# Patient Record
Sex: FEMALE | Race: WHITE | Employment: OTHER
[De-identification: names, ages, dates, MRNs, and addresses within clinical notes are randomized per-mention and may not be internally consistent; named-entity substitution may affect disease eponyms.]

---

## 2017-01-03 ENCOUNTER — OFFICE VISIT (OUTPATIENT)
Dept: ORTHOPEDIC SURGERY | Facility: CLINIC | Age: 62
End: 2017-01-03

## 2017-01-03 DIAGNOSIS — M51.37 DEGENERATION OF LUMBAR OR LUMBOSACRAL INTERVERTEBRAL DISC: Primary | ICD-10-CM

## 2017-01-03 DIAGNOSIS — M96.1 POSTLAMINECTOMY SYNDROME, LUMBAR REGION: ICD-10-CM

## 2017-01-03 PROCEDURE — 99024 POSTOP FOLLOW-UP VISIT: CPT | Performed by: ORTHOPAEDIC SURGERY

## 2017-01-10 LAB — HBA1C MFR BLD: 10 %

## 2017-01-12 ENCOUNTER — OFFICE VISIT (OUTPATIENT)
Dept: ORTHOPEDIC SURGERY | Facility: CLINIC | Age: 62
End: 2017-01-12

## 2017-01-12 DIAGNOSIS — M19.90 OSTEOARTHRITIS, UNSPECIFIED OSTEOARTHRITIS TYPE, UNSPECIFIED SITE: ICD-10-CM

## 2017-01-12 DIAGNOSIS — M96.1 POSTLAMINECTOMY SYNDROME, LUMBAR REGION: ICD-10-CM

## 2017-01-12 DIAGNOSIS — M51.37 DEGENERATION OF LUMBAR OR LUMBOSACRAL INTERVERTEBRAL DISC: Primary | ICD-10-CM

## 2017-01-12 PROCEDURE — 99024 POSTOP FOLLOW-UP VISIT: CPT | Performed by: ORTHOPAEDIC SURGERY

## 2017-01-12 RX ORDER — SULFAMETHOXAZOLE AND TRIMETHOPRIM 800; 160 MG/1; MG/1
1 TABLET ORAL 2 TIMES DAILY
Qty: 30 TABLET | Refills: 0 | Status: SHIPPED | OUTPATIENT
Start: 2017-01-12 | End: 2017-01-27

## 2017-03-20 ENCOUNTER — HOSPITAL ENCOUNTER (OUTPATIENT)
Age: 62
Discharge: HOME | End: 2017-03-20
Payer: COMMERCIAL

## 2017-03-20 LAB
ESTIMATED AVERAGE GLUCOSE: 186 MG/DL
HBA1C MFR BLD: 8.1 % (ref 4–6)

## 2017-03-20 PROCEDURE — 83036 HEMOGLOBIN GLYCOSYLATED A1C: CPT

## 2017-03-20 PROCEDURE — 36415 COLL VENOUS BLD VENIPUNCTURE: CPT

## 2017-04-27 ENCOUNTER — OFFICE VISIT (OUTPATIENT)
Dept: UROLOGY | Age: 62
End: 2017-04-27
Payer: COMMERCIAL

## 2017-04-27 VITALS
HEIGHT: 62 IN | BODY MASS INDEX: 33.49 KG/M2 | DIASTOLIC BLOOD PRESSURE: 73 MMHG | HEART RATE: 58 BPM | SYSTOLIC BLOOD PRESSURE: 132 MMHG | WEIGHT: 182 LBS

## 2017-04-27 DIAGNOSIS — R32 INCONTINENCE IN FEMALE: Primary | ICD-10-CM

## 2017-04-27 DIAGNOSIS — R35.1 NOCTURIA: ICD-10-CM

## 2017-04-27 DIAGNOSIS — N39.41 URGENCY INCONTINENCE: ICD-10-CM

## 2017-04-27 DIAGNOSIS — R35.0 FREQUENCY OF MICTURITION: ICD-10-CM

## 2017-04-27 PROCEDURE — 99204 OFFICE O/P NEW MOD 45 MIN: CPT | Performed by: UROLOGY

## 2017-04-27 RX ORDER — HYDROCODONE BITARTRATE AND ACETAMINOPHEN 5; 325 MG/1; MG/1
1 TABLET ORAL EVERY 6 HOURS PRN
COMMUNITY
Start: 2017-04-06 | End: 2020-06-16 | Stop reason: SDUPTHER

## 2017-04-27 RX ORDER — TOLTERODINE 4 MG/1
1 CAPSULE, EXTENDED RELEASE ORAL DAILY
Refills: 0 | COMMUNITY
Start: 2017-03-21 | End: 2017-06-01 | Stop reason: ALTCHOICE

## 2017-04-27 ASSESSMENT — ENCOUNTER SYMPTOMS
COLOR CHANGE: 0
BACK PAIN: 0
SHORTNESS OF BREATH: 0
WHEEZING: 0
EYE PAIN: 0
NAUSEA: 0
VOMITING: 0
ABDOMINAL PAIN: 0
COUGH: 0
EYE REDNESS: 0

## 2017-06-01 ENCOUNTER — OFFICE VISIT (OUTPATIENT)
Dept: UROLOGY | Age: 62
End: 2017-06-01
Payer: COMMERCIAL

## 2017-06-01 VITALS
WEIGHT: 180 LBS | BODY MASS INDEX: 33.13 KG/M2 | DIASTOLIC BLOOD PRESSURE: 64 MMHG | HEIGHT: 62 IN | TEMPERATURE: 98.3 F | HEART RATE: 64 BPM | SYSTOLIC BLOOD PRESSURE: 118 MMHG

## 2017-06-01 DIAGNOSIS — R35.0 FREQUENCY OF MICTURITION: ICD-10-CM

## 2017-06-01 DIAGNOSIS — N39.41 URGENCY INCONTINENCE: ICD-10-CM

## 2017-06-01 DIAGNOSIS — R35.1 NOCTURIA: ICD-10-CM

## 2017-06-01 DIAGNOSIS — R32 INCONTINENCE IN FEMALE: Primary | ICD-10-CM

## 2017-06-01 PROCEDURE — 99213 OFFICE O/P EST LOW 20 MIN: CPT | Performed by: NURSE PRACTITIONER

## 2017-06-01 ASSESSMENT — ENCOUNTER SYMPTOMS
EYE REDNESS: 0
COLOR CHANGE: 0
WHEEZING: 0
COUGH: 0
SHORTNESS OF BREATH: 0
EYE PAIN: 0
ABDOMINAL PAIN: 0
BACK PAIN: 1
VOMITING: 0
NAUSEA: 0

## 2017-07-08 RX ORDER — LOSARTAN POTASSIUM AND HYDROCHLOROTHIAZIDE 12.5; 5 MG/1; MG/1
1 TABLET ORAL DAILY
Qty: 90 TABLET | Refills: 0 | Status: SHIPPED | OUTPATIENT
Start: 2017-07-08 | End: 2017-11-17 | Stop reason: SDUPTHER

## 2017-07-12 ENCOUNTER — TELEPHONE (OUTPATIENT)
Dept: UROLOGY | Age: 62
End: 2017-07-12

## 2017-07-20 VITALS — BODY MASS INDEX: 33.49 KG/M2 | WEIGHT: 182 LBS | HEIGHT: 62 IN

## 2017-07-20 DIAGNOSIS — E08.21 DIABETES MELLITUS DUE TO UNDERLYING CONDITION WITH DIABETIC NEPHROPATHY, WITH LONG-TERM CURRENT USE OF INSULIN (HCC): ICD-10-CM

## 2017-07-20 DIAGNOSIS — Z79.4 DIABETES MELLITUS DUE TO UNDERLYING CONDITION WITH DIABETIC NEPHROPATHY, WITH LONG-TERM CURRENT USE OF INSULIN (HCC): ICD-10-CM

## 2017-07-20 DIAGNOSIS — E78.00 PURE HYPERCHOLESTEROLEMIA: ICD-10-CM

## 2017-07-20 DIAGNOSIS — Z96.41 INSULIN PUMP IN PLACE: ICD-10-CM

## 2017-07-20 RX ORDER — GABAPENTIN 800 MG/1
TABLET ORAL
Refills: 0 | COMMUNITY
Start: 2017-07-07 | End: 2017-07-21 | Stop reason: ALTCHOICE

## 2017-07-20 RX ORDER — DIAZEPAM 10 MG/1
10 TABLET ORAL EVERY 6 HOURS PRN
COMMUNITY
End: 2017-07-21 | Stop reason: ALTCHOICE

## 2017-07-20 RX ORDER — TOLTERODINE 4 MG/1
4 CAPSULE, EXTENDED RELEASE ORAL DAILY
COMMUNITY
End: 2017-07-21 | Stop reason: ALTCHOICE

## 2017-07-21 ENCOUNTER — OFFICE VISIT (OUTPATIENT)
Dept: ENDOCRINOLOGY | Age: 62
End: 2017-07-21
Payer: COMMERCIAL

## 2017-07-21 VITALS
DIASTOLIC BLOOD PRESSURE: 71 MMHG | HEART RATE: 73 BPM | SYSTOLIC BLOOD PRESSURE: 119 MMHG | HEIGHT: 62 IN | WEIGHT: 186 LBS | BODY MASS INDEX: 34.23 KG/M2

## 2017-07-21 DIAGNOSIS — I10 ESSENTIAL HYPERTENSION: ICD-10-CM

## 2017-07-21 DIAGNOSIS — E10.65 TYPE 1 DIABETES MELLITUS WITH HYPERGLYCEMIA (HCC): Primary | ICD-10-CM

## 2017-07-21 DIAGNOSIS — Z46.81 INSULIN PUMP TITRATION: ICD-10-CM

## 2017-07-21 DIAGNOSIS — Z96.41 INSULIN PUMP IN PLACE: ICD-10-CM

## 2017-07-21 DIAGNOSIS — E78.00 HYPERCHOLESTEROLEMIA: ICD-10-CM

## 2017-07-21 DIAGNOSIS — E66.9 NON MORBID OBESITY, UNSPECIFIED OBESITY TYPE: ICD-10-CM

## 2017-07-21 DIAGNOSIS — E89.0 POSTOPERATIVE HYPOTHYROIDISM: ICD-10-CM

## 2017-07-21 PROCEDURE — 99214 OFFICE O/P EST MOD 30 MIN: CPT | Performed by: INTERNAL MEDICINE

## 2017-07-21 RX ORDER — LEVOTHYROXINE SODIUM 88 UG/1
88 TABLET ORAL DAILY
Qty: 30 TABLET | Refills: 7 | Status: SHIPPED | OUTPATIENT
Start: 2017-07-21 | End: 2018-08-05 | Stop reason: SDUPTHER

## 2017-09-21 ENCOUNTER — OFFICE VISIT (OUTPATIENT)
Dept: ENDOCRINOLOGY | Age: 62
End: 2017-09-21
Payer: COMMERCIAL

## 2017-09-21 VITALS
WEIGHT: 180 LBS | HEIGHT: 62 IN | BODY MASS INDEX: 33.13 KG/M2 | HEART RATE: 80 BPM | SYSTOLIC BLOOD PRESSURE: 110 MMHG | DIASTOLIC BLOOD PRESSURE: 60 MMHG

## 2017-09-21 DIAGNOSIS — E10.65 TYPE 1 DIABETES MELLITUS WITH HYPERGLYCEMIA (HCC): Primary | ICD-10-CM

## 2017-09-21 DIAGNOSIS — Z46.81 INSULIN PUMP TITRATION: ICD-10-CM

## 2017-09-21 DIAGNOSIS — K59.1 FUNCTIONAL DIARRHEA: ICD-10-CM

## 2017-09-21 DIAGNOSIS — Z96.41 INSULIN PUMP IN PLACE: ICD-10-CM

## 2017-09-21 DIAGNOSIS — R10.30 LOWER ABDOMINAL PAIN: ICD-10-CM

## 2017-09-21 DIAGNOSIS — E89.0 POSTOPERATIVE HYPOTHYROIDISM: ICD-10-CM

## 2017-09-21 DIAGNOSIS — Z23 FLU VACCINE NEED: ICD-10-CM

## 2017-09-21 DIAGNOSIS — I10 ESSENTIAL HYPERTENSION: ICD-10-CM

## 2017-09-21 PROCEDURE — 99214 OFFICE O/P EST MOD 30 MIN: CPT | Performed by: INTERNAL MEDICINE

## 2017-09-21 PROCEDURE — G0008 ADMIN INFLUENZA VIRUS VAC: HCPCS | Performed by: INTERNAL MEDICINE

## 2017-09-21 PROCEDURE — 90688 IIV4 VACCINE SPLT 0.5 ML IM: CPT | Performed by: INTERNAL MEDICINE

## 2017-11-30 ENCOUNTER — TELEPHONE (OUTPATIENT)
Dept: UROLOGY | Age: 62
End: 2017-11-30

## 2017-11-30 ENCOUNTER — OFFICE VISIT (OUTPATIENT)
Dept: UROLOGY | Age: 62
End: 2017-11-30
Payer: COMMERCIAL

## 2017-11-30 VITALS
HEART RATE: 72 BPM | WEIGHT: 182 LBS | SYSTOLIC BLOOD PRESSURE: 93 MMHG | HEIGHT: 62 IN | BODY MASS INDEX: 33.49 KG/M2 | TEMPERATURE: 98.3 F | DIASTOLIC BLOOD PRESSURE: 60 MMHG

## 2017-11-30 DIAGNOSIS — R35.0 FREQUENCY OF MICTURITION: ICD-10-CM

## 2017-11-30 DIAGNOSIS — R35.1 NOCTURIA: ICD-10-CM

## 2017-11-30 DIAGNOSIS — R33.9 INCOMPLETE EMPTYING OF BLADDER: Primary | ICD-10-CM

## 2017-11-30 DIAGNOSIS — N39.41 URGENCY INCONTINENCE: ICD-10-CM

## 2017-11-30 DIAGNOSIS — R32 INCONTINENCE IN FEMALE: ICD-10-CM

## 2017-11-30 PROCEDURE — 51798 US URINE CAPACITY MEASURE: CPT | Performed by: NURSE PRACTITIONER

## 2017-11-30 PROCEDURE — 99214 OFFICE O/P EST MOD 30 MIN: CPT | Performed by: NURSE PRACTITIONER

## 2017-11-30 ASSESSMENT — ENCOUNTER SYMPTOMS
VOMITING: 0
EYE REDNESS: 0
COLOR CHANGE: 0
BACK PAIN: 1
SHORTNESS OF BREATH: 0
ABDOMINAL PAIN: 0
NAUSEA: 0
EYE PAIN: 0
COUGH: 0
WHEEZING: 0

## 2017-11-30 NOTE — LETTER
[facility]  [facility]  [address] [address]  Dept: [phone]  Dept Fax: [phone]        11/30/17    Patient: Shira Brooks  YOB: 1955    Dear Sebastian Dinero MD,    I had the pleasure of seeing one of your patients, Kristina Aguayo today in the office today. Below are the relevant portions of my assessment and plan of care. IMPRESSION:     1. Incomplete emptying of bladder    2. Incontinence in female    3. Urgency incontinence    4. Frequency of micturition    5. Nocturia        PLAN:   Continues kegels  Daily- squeeze and hold 3 seconds relax 3 seconds, repeat 12x/ 4-5x per day    Quick flick 97F 4-5 x per day    Continue Myrbetriq    kegel with lifting, bending    PFT after the first of the uyr. No Follow-up on file. Prescriptions Ordered:  Orders Placed This Encounter   Medications    Mirabegron ER (MYRBETRIQ) 50 MG TB24     Sig: Take 50 mg by mouth daily     Dispense:  30 tablet     Refill:  11      Orders Placed:  Orders Placed This Encounter   Procedures    GA MEASUREMENT,POST-VOID RESIDUAL VOLUME BY US,NON-IMAGING         Thank you for allowing me to participate in the care of this patient. I will keep you updated on this patient's follow up and I look forward to serving you and your patients again in the future.     Florencio Frankel, CNP

## 2017-12-01 RX ORDER — METOCLOPRAMIDE 5 MG/1
5 TABLET ORAL 2 TIMES DAILY
Qty: 60 TABLET | Refills: 3 | Status: SHIPPED | OUTPATIENT
Start: 2017-12-01 | End: 2018-11-10 | Stop reason: SDUPTHER

## 2017-12-01 RX ORDER — SERTRALINE HYDROCHLORIDE 100 MG/1
100 TABLET, FILM COATED ORAL DAILY
Qty: 30 TABLET | Refills: 3 | Status: SHIPPED | OUTPATIENT
Start: 2017-12-01 | End: 2018-05-03 | Stop reason: SDUPTHER

## 2017-12-06 ENCOUNTER — OFFICE VISIT (OUTPATIENT)
Dept: ENDOCRINOLOGY | Age: 62
End: 2017-12-06
Payer: COMMERCIAL

## 2017-12-06 VITALS
WEIGHT: 183.8 LBS | SYSTOLIC BLOOD PRESSURE: 120 MMHG | TEMPERATURE: 97.8 F | HEART RATE: 83 BPM | DIASTOLIC BLOOD PRESSURE: 60 MMHG | BODY MASS INDEX: 33.82 KG/M2 | HEIGHT: 62 IN

## 2017-12-06 DIAGNOSIS — E11.43 DIABETIC GASTROPARESIS (HCC): ICD-10-CM

## 2017-12-06 DIAGNOSIS — K31.84 DIABETIC GASTROPARESIS (HCC): ICD-10-CM

## 2017-12-06 DIAGNOSIS — E89.0 POSTOPERATIVE HYPOTHYROIDISM: ICD-10-CM

## 2017-12-06 DIAGNOSIS — Z96.41 INSULIN PUMP IN PLACE: ICD-10-CM

## 2017-12-06 DIAGNOSIS — I10 ESSENTIAL HYPERTENSION: ICD-10-CM

## 2017-12-06 DIAGNOSIS — E10.65 TYPE 1 DIABETES MELLITUS WITH HYPERGLYCEMIA (HCC): Primary | ICD-10-CM

## 2017-12-06 DIAGNOSIS — E10.65 TYPE 1 DIABETES MELLITUS WITH HYPERGLYCEMIA (HCC): ICD-10-CM

## 2017-12-06 DIAGNOSIS — K21.9 GASTROESOPHAGEAL REFLUX DISEASE WITHOUT ESOPHAGITIS: ICD-10-CM

## 2017-12-06 LAB
ALBUMIN SERPL-MCNC: NORMAL G/DL
ALP BLD-CCNC: NORMAL U/L
ALT SERPL-CCNC: NORMAL U/L
ANION GAP SERPL CALCULATED.3IONS-SCNC: NORMAL MMOL/L
AST SERPL-CCNC: NORMAL U/L
AVERAGE GLUCOSE: 206
BASOPHILS ABSOLUTE: NORMAL /ΜL
BASOPHILS RELATIVE PERCENT: NORMAL %
BILIRUB SERPL-MCNC: NORMAL MG/DL (ref 0.1–1.4)
BILIRUBIN, URINE: NORMAL
BLOOD, URINE: NORMAL
BUN BLDV-MCNC: NORMAL MG/DL
CALCIUM SERPL-MCNC: NORMAL MG/DL
CHLORIDE BLD-SCNC: NORMAL MMOL/L
CHOLESTEROL, TOTAL: 150 MG/DL
CHOLESTEROL/HDL RATIO: 3.1
CLARITY: NORMAL
CO2: NORMAL MMOL/L
COLOR: NORMAL
CREAT SERPL-MCNC: NORMAL MG/DL
CREATININE URINE: NORMAL MG/DL
EOSINOPHILS ABSOLUTE: NORMAL /ΜL
EOSINOPHILS RELATIVE PERCENT: NORMAL %
GFR CALCULATED: NORMAL
GLUCOSE BLD-MCNC: NORMAL MG/DL
GLUCOSE URINE: NORMAL
HBA1C MFR BLD: 8.8 %
HCT VFR BLD CALC: NORMAL % (ref 36–46)
HDLC SERPL-MCNC: 48 MG/DL (ref 35–70)
HEMOGLOBIN: NORMAL G/DL (ref 12–16)
KETONES, URINE: NORMAL
LDL CHOLESTEROL CALCULATED: 89 MG/DL (ref 0–160)
LEUKOCYTE ESTERASE, URINE: NORMAL
LYMPHOCYTES ABSOLUTE: NORMAL /ΜL
LYMPHOCYTES RELATIVE PERCENT: NORMAL %
MCH RBC QN AUTO: NORMAL PG
MCHC RBC AUTO-ENTMCNC: NORMAL G/DL
MCV RBC AUTO: NORMAL FL
MICROALBUMIN/CREAT 24H UR: NORMAL MG/G{CREAT}
MONOCYTES ABSOLUTE: NORMAL /ΜL
MONOCYTES RELATIVE PERCENT: NORMAL %
NEUTROPHILS ABSOLUTE: NORMAL /ΜL
NEUTROPHILS RELATIVE PERCENT: NORMAL %
NITRITE, URINE: NORMAL
PDW BLD-RTO: NORMAL %
PH UA: NORMAL (ref 4.5–8)
PLATELET # BLD: NORMAL K/ΜL
PMV BLD AUTO: NORMAL FL
POTASSIUM SERPL-SCNC: NORMAL MMOL/L
PROTEIN UA: NORMAL
RBC # BLD: NORMAL 10^6/ΜL
SODIUM BLD-SCNC: NORMAL MMOL/L
SPECIFIC GRAVITY, URINE: NORMAL
TOTAL PROTEIN: NORMAL
TRIGL SERPL-MCNC: 66 MG/DL
TSH SERPL DL<=0.05 MIU/L-ACNC: NORMAL UIU/ML
UROBILINOGEN, URINE: NORMAL
VLDLC SERPL CALC-MCNC: 13 MG/DL
WBC # BLD: NORMAL 10^3/ML

## 2017-12-06 PROCEDURE — 99214 OFFICE O/P EST MOD 30 MIN: CPT | Performed by: INTERNAL MEDICINE

## 2017-12-06 RX ORDER — SUCRALFATE 1 G/1
1 TABLET ORAL 4 TIMES DAILY
Qty: 120 TABLET | Refills: 2 | Status: SHIPPED | OUTPATIENT
Start: 2017-12-06

## 2017-12-06 NOTE — PROGRESS NOTES
Chronic Disease Visit Information    BP Readings from Last 3 Encounters:   11/30/17 93/60   09/21/17 110/60   07/21/17 119/71          Hemoglobin A1C (%)   Date Value   03/20/2017 8.1 (H)   01/10/2017 10.0   04/13/2016 7.9 (H)     Microalb/Crt. Ratio (mcg/mg creat)   Date Value   09/13/2016 7     LDL Cholesterol (mg/dL)   Date Value   09/13/2016 72     HDL (mg/dL)   Date Value   09/13/2016 60     BUN (mg/dL)   Date Value   11/14/2016 17     CREATININE (mg/dL)   Date Value   11/14/2016 0.63     Glucose (mg/dL)   Date Value   11/14/2016 171 (H)   10/13/2011 310 (H)            Have you changed or started any medications since your last visit including any over-the-counter medicines, vitamins, or herbal medicines? no   Are you having any side effects from any of your medications? -  no  Have you stopped taking any of your medications? Is so, why? -  no    Have you seen any other physician or provider since your last visit? Yes - Records Requested  Have you had any other diagnostic tests since your last visit? No  Have you been seen in the emergency room and/or had an admission to a hospital since we last saw you? No  Have you had your annual diabetic retinal (eye) exam? Yes - Records Requested  Have you had your routine dental cleaning in the past 6 months? no    Have you activated your [employer] account? If not, what are your barriers?  No: pending     Patient Care Team:  Lianet Briggs MD as PCP - General (Family Medicine)  Jean Claude Rodriguez MD as Orthopedic Surgeon (Orthopedic Surgery)  Monica Hill MD as Consulting Physician (Gastroenterology)  Zully Stafford MD as Consulting Physician (Internal Medicine)  Stefanie Man MD as Consulting Physician (Cardiology)  Jose Hernandez MD as Surgeon (Ophthalmology)         Medical History Review  Past Medical, Family, and Social History reviewed and does contribute to the patient presenting condition    Health Maintenance   Topic Date Due    Hepatitis C screen  1955    HIV screen  09/27/1970    DTaP/Tdap/Td vaccine (1 - Tdap) 09/27/1974    Cervical cancer screen  09/27/1976    Breast cancer screen  09/27/2005    Zostavax vaccine  09/27/2015    Diabetic microalbuminuria test  09/13/2017    Lipid screen  09/13/2017    Diabetic foot exam  01/10/2018    Diabetic hemoglobin A1C test  03/20/2018    Diabetic retinal exam  03/27/2018    Colon cancer screen colonoscopy  09/14/2026    Flu vaccine  Completed    Pneumococcal med risk  Completed

## 2017-12-19 ENCOUNTER — TELEPHONE (OUTPATIENT)
Dept: INTERNAL MEDICINE CLINIC | Age: 62
End: 2017-12-19

## 2018-01-18 ENCOUNTER — TELEPHONE (OUTPATIENT)
Dept: UROLOGY | Age: 63
End: 2018-01-18

## 2018-03-06 ENCOUNTER — PROCEDURE VISIT (OUTPATIENT)
Dept: UROLOGY | Age: 63
End: 2018-03-06
Payer: COMMERCIAL

## 2018-03-06 VITALS
DIASTOLIC BLOOD PRESSURE: 72 MMHG | TEMPERATURE: 98.2 F | HEIGHT: 62 IN | BODY MASS INDEX: 34.04 KG/M2 | WEIGHT: 185 LBS | SYSTOLIC BLOOD PRESSURE: 133 MMHG | HEART RATE: 69 BPM

## 2018-03-06 DIAGNOSIS — N39.41 URGENCY INCONTINENCE: Primary | ICD-10-CM

## 2018-03-06 DIAGNOSIS — R35.0 FREQUENCY OF MICTURITION: ICD-10-CM

## 2018-03-06 DIAGNOSIS — R35.1 NOCTURIA: ICD-10-CM

## 2018-03-06 PROCEDURE — 91122 PR ANAL PRESSURE RECORD: CPT | Performed by: NURSE PRACTITIONER

## 2018-03-06 PROCEDURE — 97032 APPL MODALITY 1+ESTIM EA 15: CPT | Performed by: NURSE PRACTITIONER

## 2018-03-06 PROCEDURE — 99999 PR OFFICE/OUTPT VISIT,PROCEDURE ONLY: CPT | Performed by: NURSE PRACTITIONER

## 2018-03-06 PROCEDURE — 51784 ANAL/URINARY MUSCLE STUDY: CPT | Performed by: NURSE PRACTITIONER

## 2018-03-06 PROCEDURE — 97750 PHYSICAL PERFORMANCE TEST: CPT | Performed by: NURSE PRACTITIONER

## 2018-03-06 ASSESSMENT — ENCOUNTER SYMPTOMS
BACK PAIN: 1
COUGH: 0
EYE REDNESS: 0
ABDOMINAL PAIN: 0
NAUSEA: 0
EYE PAIN: 0
SHORTNESS OF BREATH: 0
WHEEZING: 0
VOMITING: 0
COLOR CHANGE: 0

## 2018-03-06 NOTE — PROGRESS NOTES
Depression     Diabetes ([address]     Diabetic polyneuropathy ([address]     EKG abnormalities 11/14/2016    RT. BBB    GERD (gastroesophageal reflux disease)     Headache     Hyperlipidemia     Hypertension     Hypothyroidism     Insulin pump in place     Neuropathy (HCC)     Osteoarthritis     Peripheral vascular disease (HCC)     Pure hypercholesterolemia     Restless legs syndrome     Sleep apnea     Ulcer of lower extremity ([address]     Wears glasses      Past Surgical History:   Procedure Laterality Date    BACK SURGERY      cage in place at L5-S1., SPINAL CORD STIMULATOR    CARPAL TUNNEL RELEASE Bilateral     CATARACT REMOVAL WITH IMPLANT Right 05/16/2017    CATARACT REMOVAL WITH IMPLANT Left 06/13/2017    DR SHARON HERRERA    CHOLECYSTECTOMY      COLONOSCOPY  07/13/2004    normal    COLONOSCOPY  09/14/2016    no lesions seen, spasms sigmoid colon    CYST REMOVAL Right     GANGLION CYST REMOVED.  ENDOSCOPY, COLON, DIAGNOSTIC      EGD    JOINT REPLACEMENT Left     knee    OTHER SURGICAL HISTORY  11/21/2016    Revision of spinal cord stimulator    THYROID LOBECTOMY      UPPER GASTROINTESTINAL ENDOSCOPY  08/16/2016    NO LESIONS SEEN     Family History   Problem Relation Age of Onset    Heart Disease Mother     Dementia Mother     Stroke Mother     Heart Disease Father     COPD Father     Heart Disease Brother     Arthritis Brother     Other Brother      AAA    Other Brother      AAA     Outpatient Prescriptions Marked as Taking for the 3/6/18 encounter (Procedure visit) with Melanie Black CNP   Medication Sig Dispense Refill    NOVOLOG 100 UNIT/ML injection vial inject 60 units VIA PUMP ONCE DAILY.  60 mL 3    metoprolol tartrate (LOPRESSOR) 25 MG tablet Take 1 tablet by mouth 2 times daily 60 tablet 9    sucralfate (CARAFATE) 1 GM tablet Take 1 tablet by mouth 4 times daily 120 tablet 2    metoclopramide (REGLAN) 5 MG tablet Take 1 tablet by mouth 2 times daily 60 tablet 3    Neurological: Negative for dizziness, tremors and numbness. Hematological: Negative for adenopathy. Does not bruise/bleed easily. Physical Exam:    There were no vitals filed for this visit. Body mass index is 33.84 kg/m². Patient is a 58 y.o. female in no acute distress and alert and oriented to person, place and time. Physical Exam  Constitutional: Patient in no acute distress. Neuro: Alert and oriented to person, place and time. Psych: Mood normal, affect normal  Skin: No rash noted  HEENT: Head: Normocephalic and atraumatic  Conjunctivae and EOM are normal. Pupils are equal, round  Nose: Normal  Right External Ear: Normal; Left External Ear: Normal  Mouth: Mucosa Moist  Neck: Supple  Lungs: Respiratory effort is normal  Cardiovascular: Warm & Pink  Abdomen: Soft, non-tender, non-distended with no CVA,  No flank tenderness,  Or hepatosplenomegaly   Lymphatics: No palpable lymphadenopathy. Bladder non-tender and not distended. Musculoskeletal: Normal gait and station      Assessment and Plan      No diagnosis found. Plan:      No Follow-up on file. Prescriptions Ordered:  No orders of the defined types were placed in this encounter. Orders Placed:  No orders of the defined types were placed in this encounter.            Kristy Lauren, CNP

## 2018-03-07 NOTE — PROGRESS NOTES
Here for PFT #1. Is on Myrbetriq, has urgency, 10 small leaks per day, nocturia 2-3x, frequency hourly, 1 super absorbency pad per day. Hx 2 vaginal deliveries. Can identify pelvic floor muscles,  Weakness noted on the LT with kegel. Verify understanding of quick flick and kegel exercises. See documentation for visit scanned separately and homework given. Continue Myrbetriq.

## 2018-03-13 RX ORDER — ATORVASTATIN CALCIUM 40 MG/1
TABLET, FILM COATED ORAL
Qty: 90 TABLET | Refills: 1 | Status: SHIPPED | OUTPATIENT
Start: 2018-03-13 | End: 2018-10-11 | Stop reason: SDUPTHER

## 2018-03-20 ENCOUNTER — PROCEDURE VISIT (OUTPATIENT)
Dept: UROLOGY | Age: 63
End: 2018-03-20
Payer: COMMERCIAL

## 2018-03-20 DIAGNOSIS — N39.41 URGENCY INCONTINENCE: Primary | ICD-10-CM

## 2018-03-20 DIAGNOSIS — R35.1 NOCTURIA: ICD-10-CM

## 2018-03-20 DIAGNOSIS — R35.0 FREQUENCY OF MICTURITION: ICD-10-CM

## 2018-03-20 PROCEDURE — 51784 ANAL/URINARY MUSCLE STUDY: CPT | Performed by: NURSE PRACTITIONER

## 2018-03-20 PROCEDURE — 99999 PR OFFICE/OUTPT VISIT,PROCEDURE ONLY: CPT | Performed by: NURSE PRACTITIONER

## 2018-03-20 PROCEDURE — 97032 APPL MODALITY 1+ESTIM EA 15: CPT | Performed by: NURSE PRACTITIONER

## 2018-03-20 PROCEDURE — 97750 PHYSICAL PERFORMANCE TEST: CPT | Performed by: NURSE PRACTITIONER

## 2018-04-10 ENCOUNTER — PROCEDURE VISIT (OUTPATIENT)
Dept: UROLOGY | Age: 63
End: 2018-04-10
Payer: COMMERCIAL

## 2018-04-10 VITALS — DIASTOLIC BLOOD PRESSURE: 55 MMHG | SYSTOLIC BLOOD PRESSURE: 101 MMHG | HEART RATE: 74 BPM | TEMPERATURE: 98.4 F

## 2018-04-10 DIAGNOSIS — N39.46 MIXED INCONTINENCE URGE AND STRESS: Primary | ICD-10-CM

## 2018-04-10 DIAGNOSIS — R35.1 NOCTURIA: ICD-10-CM

## 2018-04-10 DIAGNOSIS — R35.0 FREQUENCY OF MICTURITION: ICD-10-CM

## 2018-04-10 PROCEDURE — 97750 PHYSICAL PERFORMANCE TEST: CPT | Performed by: NURSE PRACTITIONER

## 2018-04-10 PROCEDURE — 51784 ANAL/URINARY MUSCLE STUDY: CPT | Performed by: NURSE PRACTITIONER

## 2018-04-10 PROCEDURE — 97032 APPL MODALITY 1+ESTIM EA 15: CPT | Performed by: NURSE PRACTITIONER

## 2018-04-10 PROCEDURE — 99999 PR OFFICE/OUTPT VISIT,PROCEDURE ONLY: CPT | Performed by: NURSE PRACTITIONER

## 2018-04-10 PROCEDURE — 91122 PR ANAL PRESSURE RECORD: CPT | Performed by: NURSE PRACTITIONER

## 2018-05-03 RX ORDER — SERTRALINE HYDROCHLORIDE 100 MG/1
TABLET, FILM COATED ORAL
Qty: 30 TABLET | Refills: 5 | Status: SHIPPED | OUTPATIENT
Start: 2018-05-03 | End: 2018-12-19 | Stop reason: SDUPTHER

## 2018-07-05 NOTE — PROGRESS NOTES
DIZZINESS. NO CHEST PAIN  NO SHORTNESS OF BREATH  ACID REFLUX  BETTER WITH CARAFATE     OTHER PROBLEMS  :     CHRONIC LOW BACK PAIN  DEPRESSION  DEG ARTHRITIS KNEES        Past Medical History:   Diagnosis Date    CAD (coronary artery disease)     \"40 % blockage lower heart\"    Cataracts, bilateral     Chest pain     pressure    Chronic back pain     Depression     [facility]     Diabetic polyneuropathy ([facility]     EKG abnormalities 11/14/2016    RT. BBB    GERD (gastroesophageal reflux disease)     Headache     Hyperlipidemia     Hypertension     Hypothyroidism     Insulin pump in place     Neuropathy (HCC)     Osteoarthritis     Peripheral vascular disease (HCC)     Pure hypercholesterolemia     Restless legs syndrome     Sleep apnea     Ulcer of lower extremity ([facility]     Wears glasses         Past Surgical History:   Procedure Laterality Date    BACK SURGERY      cage in place at L5-S1., SPINAL CORD STIMULATOR    CARPAL TUNNEL RELEASE Bilateral     CATARACT REMOVAL WITH IMPLANT Right 05/16/2017    CATARACT REMOVAL WITH IMPLANT Left 06/13/2017    DR SHARON HERRERA    CHOLECYSTECTOMY      COLONOSCOPY  07/13/2004    normal    COLONOSCOPY  09/14/2016    no lesions seen, spasms sigmoid colon    CYST REMOVAL Right     GANGLION CYST REMOVED.     ENDOSCOPY, COLON, DIAGNOSTIC      EGD    JOINT REPLACEMENT Left     knee    OTHER SURGICAL HISTORY  11/21/2016    Revision of spinal cord stimulator    THYROID LOBECTOMY      UPPER GASTROINTESTINAL ENDOSCOPY  08/16/2016    NO LESIONS SEEN     Family History   Problem Relation Age of Onset    Heart Disease Mother     Dementia Mother     Stroke Mother     Heart Disease Father     COPD Father     Heart Disease Brother     Arthritis Brother     Other Brother         AAA    Other Brother         AAA     Social History   Substance Use Topics    Smoking status: Never Smoker    Smokeless tobacco: Never Used    Alcohol use No        Current Outpatient Prescriptions   Medication Sig Dispense Refill    insulin aspart (NOVOLOG) 100 UNIT/ML injection vial inject 60 units VIA PUMP ONCE DAILY. 60 mL 3    sertraline (ZOLOFT) 100 MG tablet take 1 tablet by mouth once daily 30 tablet 5    atorvastatin (LIPITOR) 40 MG tablet take 1 tablet by mouth once daily 90 tablet 1    metoprolol tartrate (LOPRESSOR) 25 MG tablet Take 1 tablet by mouth 2 times daily 60 tablet 9    sucralfate (CARAFATE) 1 GM tablet Take 1 tablet by mouth 4 times daily 120 tablet 2    metoclopramide (REGLAN) 5 MG tablet Take 1 tablet by mouth 2 times daily 60 tablet 3    aspirin 81 MG tablet Take by mouth daily      Mirabegron ER (MYRBETRIQ) 50 MG TB24 Take 50 mg by mouth daily 30 tablet 11    losartan-hydrochlorothiazide (HYZAAR) 50-12.5 MG per tablet take 1 tablet by mouth once daily 90 tablet 2    FOLIC ACID PO Take by mouth      levothyroxine (SYNTHROID) 88 MCG tablet Take 1 tablet by mouth daily 30 tablet 7    HYDROcodone-acetaminophen (NORCO) 5-325 MG per tablet Take 1 tablet by mouth every 6 hours as needed  .  cyclobenzaprine (FLEXERIL) 5 MG tablet Take 5 mg by mouth 3 times daily as needed for Muscle spasms      isosorbide mononitrate (IMDUR) 60 MG CR tablet 1 tablet daily      gabapentin (NEURONTIN) 400 MG capsule Take 400 mg by mouth 3 times daily      Omega-3 Fatty Acids (FISH OIL) 1000 MG CPDR Take  by mouth. No current facility-administered medications for this visit. Allergies   Allergen Reactions    Actos [Pioglitazone] Other (See Comments)     Weight gain    Lisinopril Other (See Comments)     cough    Metformin And Related Diarrhea    Zithromax [Azithromycin] Itching         Subjective:      Review of Systems AS NOTED IN HPI        Objective:      Physical Exam    PATIENT IS OBESE. NO DISTRESS. Reviewed Vitals  Eyes: BHAVANI  Neck: NO MASSES. NO ADENOPATHY. THYROID NOT ENLARGED. Cardiovascular:  HEART REGULAR, NO MURMUR.  NO CAROTID BRUIT  Respiratory:  BREATHING COMFORTABLY. LUNGS CLEAR. NO WHEEZES  Abdomen:  SOFT. NO TENDERNESS. LIVER AND SPLEEN NOT PALPABLE. NO OTHER MASSES FELT. Extremities: NO EDEMA. NO CALF TENDERNESS. Peripheral Vascular:  PEDAL PULSUS GOOD  Neurological:   MONO FILAMENT SENSATIONS DIMINISHED BOTH FEET. REFLEXES NORMAL BOTH SIDES. Assessment:   LAB / IMAGING    POCT A1C 9.1 ON 7/6/18    LAB  FROM 12/5/17     TSH 2.82  BUN 19, CREAT 0.70, LYTES 139/3.8, CA 9.7, GLU  188, ALB 4.1, ALT 13  CHOL 150, TRIG 66, HDL 48, LDL 89  MICROALB 24 MG/G  UA 23 WBC  CBC  HGB 13.2, WBC 83, PLAT 177  A1C 8.8     IMPRESSION :     TYPE I DIABETES ON INSULIN PUMP . UNCONTROLLED. POCT A1C 9.1 ON 7/6/18  HYPERTENSION  HYPERCHOLESTEROLEMIA  HX OF GASTROPARESIS  AND ACID REFLUX ON PRILOSEC AND REGLAN. SYMPTOMS OF ACID REFLUX WORSE FOR LAST 3 WEEKS  HYPOTHYROIDISM  S/P THYROID NODULE RESECTION BENIGN. ON LEVOTHYROXINE    HX OF RETINOPATHY AND NEUROPATHY  OBEITY     OTHER PROBLEMS  :     CHRONIC LOW BACK PAIN  DEPRESSION  DEG ARTHRITIS KNEES       1. Type 1 diabetes mellitus with hyperglycemia (HCC)    2. Postoperative hypothyroidism    3. Insulin pump in place    4. Gastroesophageal reflux disease without esophagitis    5. Diabetic gastroparesis ([address]    6. Essential hypertension               Plan:      1. A1C RESULTS DISCUSSED  2. CONTINUE PRESENT BASAL SETTINGS 12 MID NIGHT1.4 U/HR, 8 A.M 1.7 U/HR, 10 P.M 1.6 U/HR. SENSITIVITY 40, [employer]   . CARB RATIO  8 GM AT BREAKFAST LUNCH AND SUPPER  3. CONTINUE CARAFATE. 4. CONTINUE ALL OTHER MEDS AS REVIEWED AND DISCUSSED. 5. WATCH DIET AND EXERCISE AS TOLERATED. 6. PATIENT SAYS INSULIN IS EXPENSIVE. SINCE PATIENT IS ON INSULIN PUMP SHE WILL BE ELIGIBLE FOR PART B COVERAGE.   7. RETURN 3 MONTHS    Orders Placed This Encounter   Procedures    POCT glycosylated hemoglobin (Hb A1C)     DIABETES FOOT EXAM     Orders Placed This Encounter   Medications    DISCONTD: insulin aspart ([employer])

## 2018-07-06 ENCOUNTER — OFFICE VISIT (OUTPATIENT)
Dept: ENDOCRINOLOGY | Age: 63
End: 2018-07-06
Payer: COMMERCIAL

## 2018-07-06 VITALS
WEIGHT: 179.2 LBS | SYSTOLIC BLOOD PRESSURE: 110 MMHG | HEIGHT: 62 IN | BODY MASS INDEX: 32.97 KG/M2 | TEMPERATURE: 98.1 F | DIASTOLIC BLOOD PRESSURE: 60 MMHG | OXYGEN SATURATION: 98 % | HEART RATE: 67 BPM

## 2018-07-06 DIAGNOSIS — E89.0 POSTOPERATIVE HYPOTHYROIDISM: ICD-10-CM

## 2018-07-06 DIAGNOSIS — K31.84 DIABETIC GASTROPARESIS (HCC): ICD-10-CM

## 2018-07-06 DIAGNOSIS — K21.9 GASTROESOPHAGEAL REFLUX DISEASE WITHOUT ESOPHAGITIS: ICD-10-CM

## 2018-07-06 DIAGNOSIS — I10 ESSENTIAL HYPERTENSION: ICD-10-CM

## 2018-07-06 DIAGNOSIS — E11.43 DIABETIC GASTROPARESIS (HCC): ICD-10-CM

## 2018-07-06 DIAGNOSIS — E10.65 TYPE 1 DIABETES MELLITUS WITH HYPERGLYCEMIA (HCC): Primary | ICD-10-CM

## 2018-07-06 DIAGNOSIS — Z96.41 INSULIN PUMP IN PLACE: ICD-10-CM

## 2018-07-06 LAB — HBA1C MFR BLD: 9.1 %

## 2018-07-06 PROCEDURE — 99214 OFFICE O/P EST MOD 30 MIN: CPT | Performed by: INTERNAL MEDICINE

## 2018-07-06 PROCEDURE — 83036 HEMOGLOBIN GLYCOSYLATED A1C: CPT | Performed by: INTERNAL MEDICINE

## 2018-07-06 NOTE — PROGRESS NOTES
Chronic Disease Visit Information    BP Readings from Last 3 Encounters:   04/10/18 (!) 101/55   03/06/18 133/72   12/06/17 120/60          Hemoglobin A1C (%)   Date Value   12/05/2017 8.8   03/20/2017 8.1 (H)   01/10/2017 10.0     Microalb/Crt. Ratio (mcg/mg creat)   Date Value   09/13/2016 7     LDL Cholesterol (mg/dL)   Date Value   09/13/2016 72     LDL Calculated (mg/dL)   Date Value   12/05/2017 89     HDL (mg/dL)   Date Value   12/05/2017 48     BUN (mg/dL)   Date Value   11/14/2016 17     CREATININE (mg/dL)   Date Value   11/14/2016 0.63     Glucose (mg/dL)   Date Value   11/14/2016 171 (H)   10/13/2011 310 (H)            Have you changed or started any medications since your last visit including any over-the-counter medicines, vitamins, or herbal medicines? no   Are you having any side effects from any of your medications? -  no  Have you stopped taking any of your medications? Is so, why? -  no    Have you seen any other physician or provider since your last visit? Yes - Records Requested  Have you had any other diagnostic tests since your last visit? No  Have you been seen in the emergency room and/or had an admission to a hospital since we last saw you? No  Have you had your annual diabetic retinal (eye) exam? Yes - Records Requested  Have you had your routine dental cleaning in the past 6 months? no    Have you activated your [employer] account? If not, what are your barriers?  Yes     Patient Care Team:  Tiara Beaver MD as PCP - General (Family Medicine)  Leandro Mckeon MD as Orthopedic Surgeon (Orthopedic Surgery)  Sophie Garza MD as Consulting Physician (Gastroenterology)  Heath Scott MD as Consulting Physician (Internal Medicine)  Sunil Chang MD as Consulting Physician (Cardiology)  Mandy Romano MD as Surgeon (Ophthalmology)         Medical History Review  Past Medical, Family, and Social History reviewed and does contribute to the patient presenting condition    Health Maintenance   Topic Date Due    Hepatitis C screen  1955    HIV screen  09/27/1970    DTaP/Tdap/Td vaccine (1 - Tdap) 09/27/1974    Cervical cancer screen  09/27/1976    Breast cancer screen  09/27/2005    Shingles Vaccine (1 of 2 - 2 Dose Series) 09/27/2005    Diabetic foot exam  01/10/2018    Diabetic retinal exam  03/27/2018    Flu vaccine (1) 09/01/2018    A1C test (Diabetic or Prediabetic)  12/05/2018    Diabetic microalbuminuria test  12/05/2018    Lipid screen  12/05/2018    Potassium monitoring  12/05/2018    Creatinine monitoring  12/05/2018    Colon cancer screen colonoscopy  09/14/2026    Pneumococcal med risk  Completed

## 2018-10-01 RX ORDER — LOSARTAN POTASSIUM AND HYDROCHLOROTHIAZIDE 12.5; 5 MG/1; MG/1
TABLET ORAL
Qty: 90 TABLET | Refills: 2 | Status: SHIPPED | OUTPATIENT
Start: 2018-10-01 | End: 2019-07-05 | Stop reason: SDUPTHER

## 2018-10-11 RX ORDER — ATORVASTATIN CALCIUM 40 MG/1
TABLET, FILM COATED ORAL
Qty: 90 TABLET | Refills: 1 | Status: SHIPPED | OUTPATIENT
Start: 2018-10-11 | End: 2019-05-14 | Stop reason: SDUPTHER

## 2018-10-15 ENCOUNTER — OFFICE VISIT (OUTPATIENT)
Dept: ENDOCRINOLOGY | Age: 63
End: 2018-10-15
Payer: COMMERCIAL

## 2018-10-15 VITALS
WEIGHT: 174.6 LBS | HEART RATE: 86 BPM | DIASTOLIC BLOOD PRESSURE: 66 MMHG | TEMPERATURE: 98.1 F | SYSTOLIC BLOOD PRESSURE: 132 MMHG | HEIGHT: 62 IN | BODY MASS INDEX: 32.13 KG/M2

## 2018-10-15 DIAGNOSIS — I10 ESSENTIAL HYPERTENSION: ICD-10-CM

## 2018-10-15 DIAGNOSIS — E89.0 POSTOPERATIVE HYPOTHYROIDISM: ICD-10-CM

## 2018-10-15 DIAGNOSIS — Z96.41 INSULIN PUMP IN PLACE: ICD-10-CM

## 2018-10-15 DIAGNOSIS — E11.43 DIABETIC GASTROPARESIS (HCC): ICD-10-CM

## 2018-10-15 DIAGNOSIS — E10.65 TYPE 1 DIABETES MELLITUS WITH HYPERGLYCEMIA (HCC): ICD-10-CM

## 2018-10-15 DIAGNOSIS — K31.84 DIABETIC GASTROPARESIS (HCC): ICD-10-CM

## 2018-10-15 DIAGNOSIS — K21.9 GASTROESOPHAGEAL REFLUX DISEASE WITHOUT ESOPHAGITIS: ICD-10-CM

## 2018-10-15 DIAGNOSIS — E78.00 HYPERCHOLESTEROLEMIA: ICD-10-CM

## 2018-10-15 PROCEDURE — 99214 OFFICE O/P EST MOD 30 MIN: CPT | Performed by: INTERNAL MEDICINE

## 2018-10-15 NOTE — PROGRESS NOTES
BREATH  ACID REFLUX  BETTER WITH CARAFATE     OTHER PROBLEMS  :     CHRONIC LOW BACK PAIN  DEPRESSION  DEG ARTHRITIS KNEES         Past Medical History:   Diagnosis Date    CAD (coronary artery disease)     \"40 % blockage lower heart\"    Cataracts, bilateral     Chest pain     pressure    Chronic back pain     Depression     [facility]     Diabetic polyneuropathy ([facility]     EKG abnormalities 11/14/2016    RT. BBB    GERD (gastroesophageal reflux disease)     Headache     Hyperlipidemia     Hypertension     Hypothyroidism     Insulin pump in place     Neuropathy     Osteoarthritis     Peripheral vascular disease (HCC)     Pure hypercholesterolemia     Restless legs syndrome     Sleep apnea     Ulcer of lower extremity ([facility]     Wears glasses        Past Surgical History:   Procedure Laterality Date    BACK SURGERY      cage in place at L5-S1., SPINAL CORD STIMULATOR    CARPAL TUNNEL RELEASE Bilateral     CATARACT REMOVAL WITH IMPLANT Right 05/16/2017    CATARACT REMOVAL WITH IMPLANT Left 06/13/2017    DR SHARON HERRERA    CHOLECYSTECTOMY      COLONOSCOPY  07/13/2004    normal    COLONOSCOPY  09/14/2016    no lesions seen, spasms sigmoid colon    CYST REMOVAL Right     GANGLION CYST REMOVED.     ENDOSCOPY, COLON, DIAGNOSTIC      EGD    JOINT REPLACEMENT Left     knee    OTHER SURGICAL HISTORY  11/21/2016    Revision of spinal cord stimulator    THYROID LOBECTOMY      UPPER GASTROINTESTINAL ENDOSCOPY  08/16/2016    NO LESIONS SEEN     Family History   Problem Relation Age of Onset    Heart Disease Mother     Dementia Mother     Stroke Mother     Heart Disease Father     COPD Father     Heart Disease Brother     Arthritis Brother     Other Brother         AAA    Other Brother         AAA     Social History   Substance Use Topics    Smoking status: Never Smoker    Smokeless tobacco: Never Used    Alcohol use No        Current Outpatient Prescriptions   Medication Sig Dispense

## 2018-11-12 RX ORDER — METOCLOPRAMIDE 5 MG/1
TABLET ORAL
Qty: 60 TABLET | Refills: 3 | Status: SHIPPED | OUTPATIENT
Start: 2018-11-12 | End: 2019-02-13 | Stop reason: SDUPTHER

## 2018-12-19 RX ORDER — SERTRALINE HYDROCHLORIDE 100 MG/1
TABLET, FILM COATED ORAL
Qty: 30 TABLET | Refills: 1 | Status: SHIPPED | OUTPATIENT
Start: 2018-12-19 | End: 2019-02-22 | Stop reason: SDUPTHER

## 2019-01-16 ENCOUNTER — OFFICE VISIT (OUTPATIENT)
Dept: ENDOCRINOLOGY | Age: 64
End: 2019-01-16
Payer: COMMERCIAL

## 2019-01-16 ENCOUNTER — TELEPHONE (OUTPATIENT)
Dept: ENDOCRINOLOGY | Age: 64
End: 2019-01-16

## 2019-01-16 VITALS
SYSTOLIC BLOOD PRESSURE: 118 MMHG | WEIGHT: 180 LBS | DIASTOLIC BLOOD PRESSURE: 58 MMHG | OXYGEN SATURATION: 98 % | TEMPERATURE: 98.8 F | BODY MASS INDEX: 33.13 KG/M2 | HEIGHT: 62 IN | HEART RATE: 69 BPM

## 2019-01-16 DIAGNOSIS — E10.65 TYPE 1 DIABETES MELLITUS WITH HYPERGLYCEMIA (HCC): Primary | ICD-10-CM

## 2019-01-16 DIAGNOSIS — I10 ESSENTIAL HYPERTENSION: ICD-10-CM

## 2019-01-16 DIAGNOSIS — E78.00 HYPERCHOLESTEROLEMIA: ICD-10-CM

## 2019-01-16 DIAGNOSIS — E89.0 POSTOPERATIVE HYPOTHYROIDISM: ICD-10-CM

## 2019-01-16 DIAGNOSIS — Z96.41 INSULIN PUMP IN PLACE: ICD-10-CM

## 2019-01-16 LAB — HBA1C MFR BLD: 10.2 %

## 2019-01-16 PROCEDURE — 83036 HEMOGLOBIN GLYCOSYLATED A1C: CPT | Performed by: INTERNAL MEDICINE

## 2019-01-16 PROCEDURE — 99214 OFFICE O/P EST MOD 30 MIN: CPT | Performed by: INTERNAL MEDICINE

## 2019-01-16 RX ORDER — FLASH GLUCOSE SCANNING READER
EACH MISCELLANEOUS
Qty: 1 DEVICE | Refills: 0 | Status: ON HOLD | OUTPATIENT
Start: 2019-01-16 | End: 2021-06-04 | Stop reason: HOSPADM

## 2019-01-16 RX ORDER — FLASH GLUCOSE SENSOR
KIT MISCELLANEOUS
Qty: 2 EACH | Refills: 8 | Status: SHIPPED | OUTPATIENT
Start: 2019-01-16 | End: 2019-10-08 | Stop reason: SDUPTHER

## 2019-02-13 ENCOUNTER — OFFICE VISIT (OUTPATIENT)
Dept: ENDOCRINOLOGY | Age: 64
End: 2019-02-13
Payer: COMMERCIAL

## 2019-02-13 VITALS
SYSTOLIC BLOOD PRESSURE: 98 MMHG | HEART RATE: 62 BPM | BODY MASS INDEX: 34.23 KG/M2 | DIASTOLIC BLOOD PRESSURE: 78 MMHG | OXYGEN SATURATION: 97 % | TEMPERATURE: 97.9 F | WEIGHT: 186 LBS | HEIGHT: 62 IN

## 2019-02-13 DIAGNOSIS — E78.00 HYPERCHOLESTEROLEMIA: ICD-10-CM

## 2019-02-13 DIAGNOSIS — Z96.41 INSULIN PUMP IN PLACE: ICD-10-CM

## 2019-02-13 DIAGNOSIS — I10 ESSENTIAL HYPERTENSION: ICD-10-CM

## 2019-02-13 DIAGNOSIS — E89.0 POSTOPERATIVE HYPOTHYROIDISM: ICD-10-CM

## 2019-02-13 DIAGNOSIS — E10.65 TYPE 1 DIABETES MELLITUS WITH HYPERGLYCEMIA (HCC): Primary | ICD-10-CM

## 2019-02-13 LAB — HBA1C MFR BLD: 8.5 %

## 2019-02-13 PROCEDURE — 99213 OFFICE O/P EST LOW 20 MIN: CPT | Performed by: INTERNAL MEDICINE

## 2019-02-13 PROCEDURE — 83036 HEMOGLOBIN GLYCOSYLATED A1C: CPT | Performed by: INTERNAL MEDICINE

## 2019-02-13 RX ORDER — METOCLOPRAMIDE 5 MG/1
TABLET ORAL
Qty: 60 TABLET | Refills: 2 | Status: SHIPPED | OUTPATIENT
Start: 2019-02-13 | End: 2019-12-05 | Stop reason: SDUPTHER

## 2019-02-13 RX ORDER — LEVOTHYROXINE SODIUM 88 UG/1
TABLET ORAL
Qty: 30 TABLET | Refills: 5 | Status: SHIPPED | OUTPATIENT
Start: 2019-02-13 | End: 2019-07-31 | Stop reason: SDUPTHER

## 2019-02-13 RX ORDER — HYDROCODONE BITARTRATE AND ACETAMINOPHEN 7.5; 325 MG/1; MG/1
TABLET ORAL
Refills: 0 | COMMUNITY
Start: 2019-01-17

## 2019-02-22 RX ORDER — SERTRALINE HYDROCHLORIDE 100 MG/1
TABLET, FILM COATED ORAL
Qty: 30 TABLET | Refills: 0 | Status: SHIPPED | OUTPATIENT
Start: 2019-02-22 | End: 2019-05-13 | Stop reason: SDUPTHER

## 2019-02-26 RX ORDER — ISOSORBIDE MONONITRATE 60 MG/1
TABLET, EXTENDED RELEASE ORAL
Qty: 90 TABLET | Refills: 0 | Status: SHIPPED | OUTPATIENT
Start: 2019-02-26 | End: 2019-07-27 | Stop reason: SDUPTHER

## 2019-05-13 ENCOUNTER — OFFICE VISIT (OUTPATIENT)
Dept: ENDOCRINOLOGY | Age: 64
End: 2019-05-13
Payer: COMMERCIAL

## 2019-05-13 VITALS
SYSTOLIC BLOOD PRESSURE: 110 MMHG | BODY MASS INDEX: 35.15 KG/M2 | HEIGHT: 62 IN | HEART RATE: 73 BPM | TEMPERATURE: 97.6 F | OXYGEN SATURATION: 98 % | WEIGHT: 191 LBS | DIASTOLIC BLOOD PRESSURE: 64 MMHG

## 2019-05-13 DIAGNOSIS — I10 ESSENTIAL HYPERTENSION: ICD-10-CM

## 2019-05-13 DIAGNOSIS — Z96.41 INSULIN PUMP IN PLACE: ICD-10-CM

## 2019-05-13 DIAGNOSIS — E10.65 TYPE 1 DIABETES MELLITUS WITH HYPERGLYCEMIA (HCC): Primary | ICD-10-CM

## 2019-05-13 DIAGNOSIS — E89.0 POSTOPERATIVE HYPOTHYROIDISM: ICD-10-CM

## 2019-05-13 DIAGNOSIS — K21.9 GASTROESOPHAGEAL REFLUX DISEASE WITHOUT ESOPHAGITIS: ICD-10-CM

## 2019-05-13 DIAGNOSIS — F32.A DEPRESSION, UNSPECIFIED DEPRESSION TYPE: ICD-10-CM

## 2019-05-13 DIAGNOSIS — E78.00 HYPERCHOLESTEROLEMIA: ICD-10-CM

## 2019-05-13 PROCEDURE — 83036 HEMOGLOBIN GLYCOSYLATED A1C: CPT | Performed by: INTERNAL MEDICINE

## 2019-05-13 PROCEDURE — 99214 OFFICE O/P EST MOD 30 MIN: CPT | Performed by: INTERNAL MEDICINE

## 2019-05-13 RX ORDER — SERTRALINE HYDROCHLORIDE 100 MG/1
TABLET, FILM COATED ORAL
Qty: 90 TABLET | Refills: 1 | Status: SHIPPED | OUTPATIENT
Start: 2019-05-13 | End: 2019-11-03 | Stop reason: SDUPTHER

## 2019-05-13 NOTE — PROGRESS NOTES
Chronic Disease Visit Information    BP Readings from Last 3 Encounters:   02/13/19 98/78   01/16/19 (!) 118/58   10/15/18 132/66          Hemoglobin A1C (%)   Date Value   02/13/2019 8.5   01/16/2019 10.2   07/06/2018 9.1     Microalb/Crt. Ratio (mcg/mg creat)   Date Value   09/13/2016 7     LDL Cholesterol (mg/dL)   Date Value   09/13/2016 72     LDL Calculated (mg/dL)   Date Value   12/05/2017 89     HDL (mg/dL)   Date Value   12/05/2017 48     BUN (mg/dL)   Date Value   11/14/2016 17     CREATININE (mg/dL)   Date Value   11/14/2016 0.63     Glucose (mg/dL)   Date Value   11/14/2016 171 (H)   10/13/2011 310 (H)            Have you changed or started any medications since your last visit including any over-the-counter medicines, vitamins, or herbal medicines? no   Are you having any side effects from any of your medications? -  no  Have you stopped taking any of your medications? Is so, why? -  no    Have you seen any other physician or provider since your last visit? Yes - Records Obtained  Have you had any other diagnostic tests since your last visit? No  Have you been seen in the emergency room and/or had an admission to a hospital since we last saw you? No  Have you had your annual diabetic retinal (eye) exam? No  Have you had your routine dental cleaning in the past 6 months? no    Have you activated your [employer] account? If not, what are your barriers?  Yes     Patient Care Team:  Renae Ayala MD as PCP - General (Family Medicine)  Luis M Harris MD as Orthopedic Surgeon (Orthopedic Surgery)  To Riggins MD as Consulting Physician (Gastroenterology)  Amena Gallardo MD as Consulting Physician (Internal Medicine)  Ivan Hernandez MD as Consulting Physician (Cardiology)  Jairon Roland MD as Surgeon (Ophthalmology)         Medical History Review  Past Medical, Family, and Social History reviewed and does contribute to the patient presenting condition    Health Maintenance   Topic Date Due    Hepatitis C screen  1955    HIV screen  09/27/1970    DTaP/Tdap/Td vaccine (1 - Tdap) 09/27/1974    Cervical cancer screen  09/27/1976    Breast cancer screen  09/27/2005    Shingles Vaccine (1 of 2) 09/27/2005    Diabetic retinal exam  03/27/2018    Diabetic microalbuminuria test  12/05/2018    Lipid screen  12/05/2018    Potassium monitoring  12/05/2018    Creatinine monitoring  12/05/2018    Diabetic foot exam  07/06/2019    Flu vaccine (Season Ended) 09/01/2019    A1C test (Diabetic or Prediabetic)  01/16/2020    Colon cancer screen colonoscopy  09/14/2026    Pneumococcal 0-64 years Vaccine  Completed

## 2019-05-13 NOTE — PROGRESS NOTES
Dr. [facility]  [id number] [facility]. Kacey Mac is a 61 y.o. female who presents today for   Chief Complaint   Patient presents with    Diabetes     2 month f/u. On insulin pump       /64 (Site: Right Lower Arm, Position: Sitting, Cuff Size: Medium Adult)   Pulse 73 Comment: REGULAR  Temp 97.6 °F (36.4 °C)   Ht 5' 2.01\" (1.575 m)   Wt 191 lb (86.6 kg)   SpO2 98%   BMI 34.93 kg/m²     Wt Readings from Last 3 Encounters:   05/13/19 191 lb (86.6 kg)   02/13/19 186 lb (84.4 kg)   01/16/19 180 lb (81.6 kg)     Body mass index is 34.93 kg/m². BP Readings from Last 3 Encounters:   05/13/19 110/64   02/13/19 98/78   01/16/19 (!) 118/58       Health Maintenance Due   Topic Date Due    Hepatitis C screen  1955    HIV screen  09/27/1970    DTaP/Tdap/Td vaccine (1 - Tdap) 09/27/1974    Cervical cancer screen  09/27/1976    Breast cancer screen  09/27/2005    Shingles Vaccine (1 of 2) 09/27/2005    Diabetic retinal exam  03/27/2018    Diabetic microalbuminuria test  12/05/2018    Lipid screen  12/05/2018    Potassium monitoring  12/05/2018    Creatinine monitoring  12/05/2018       HPI:     HPI AND REVIEW OF SYSTEMS     PATIENT COMES IN FOR FOLLOW UP OF      TYPE I DIABETES ON INSULIN PUMP  HYPERTENSION  HYPERCHOLESTEROLEMIA  HX OF GASTROPARESIS  AND ACID REFLUX  HYPOTHYROIDISM  S/P THYROID NODULE RESECTION BENIGN. ON LEVOTHYROXINE    HX OF RETINOPATHY AND NEUROPATHY  OBESITY    CURRENT MEDS REVIEWED AND  PREVIOOUS  VISIT FROM 2//3/19  REVIEWED.     ON INSULIN PUMP BASAL AND BOLUS SETTINGS REVIEWED. USING SureBooksYLE SWETHA CONTINUOUS GLUCOSE MONITORING. SUGARS REVIEWED AND DISCUSSED. RUNNING HIGH IN LAST 4 WEEKS. NO LOW SUGAR REACTIONS  HAD BRONCHITIS IN April  , IMPROVED. TOOK ANTIBIOTIC    NO POLY URIA OR POLYDYPSIA. HAS OCCASIONAL URINARY INCONTINENCE.   NO VISION AAA    Other Brother         AAA     Social History     Tobacco Use    Smoking status: Never Smoker    Smokeless tobacco: Never Used   Substance Use Topics    Alcohol use: No        Current Outpatient Medications   Medication Sig Dispense Refill    sertraline (ZOLOFT) 100 MG tablet take 1 tablet by mouth once daily 90 tablet 1    metoprolol tartrate (LOPRESSOR) 25 MG tablet take 1 tablet by mouth twice a day 60 tablet 0    isosorbide mononitrate (IMDUR) 60 MG extended release tablet take 1 tablet by mouth once daily 90 tablet 0    levothyroxine (SYNTHROID) 88 MCG tablet take 1 tablet by mouth once daily 30 tablet 5    metoclopramide (REGLAN) 5 MG tablet take 1 tablet by mouth twice a day 60 tablet 2    HYDROcodone-acetaminophen (NORCO) 7.5-325 MG per tablet take 1 tablet by mouth every 6 hours if needed  0    insulin aspart (NOVOLOG) 100 UNIT/ML injection vial inject 60 units VIA PUMP ONCE DAILY. 60 mL 3    Continuous Blood Gluc  (FREESTYLE SWETHA 14 DAY READER) SYBIL USE AS DIRECTED. GIVE SENSORS ALSO 1 Device 0    Continuous Blood Gluc Sensor (SpinUtopia SWETHA SENSOR SYSTEM) [facility] DIRECTED 2 each 8    atorvastatin (LIPITOR) 40 MG tablet take 1 tablet by mouth once daily 90 tablet 1    losartan-hydrochlorothiazide (HYZAAR) 50-12.5 MG per tablet take 1 tablet by mouth once daily 90 tablet 2    sucralfate (CARAFATE) 1 GM tablet Take 1 tablet by mouth 4 times daily 120 tablet 2    aspirin 81 MG tablet Take by mouth daily      FOLIC ACID PO Take by mouth      HYDROcodone-acetaminophen (NORCO) 5-325 MG per tablet Take 1 tablet by mouth every 6 hours as needed  .  cyclobenzaprine (FLEXERIL) 5 MG tablet Take 5 mg by mouth 3 times daily as needed for Muscle spasms      gabapentin (NEURONTIN) 400 MG capsule Take 400 mg by mouth 3 times daily      Omega-3 Fatty Acids (FISH OIL) 1000 MG CPDR Take  by mouth. No current facility-administered medications for this visit. Allergies   Allergen Reactions    Actos [Pioglitazone] Other (See Comments)     Weight gain    Lisinopril Other (See Comments)     cough    Metformin And Related Diarrhea    Zithromax [Azithromycin] Itching       Subjective:      Review of Systems  AS NOTED IN HPI        Objective:      Physical Exam     PATIENT IS OBESE. NO DISTRESS. Reviewed Vitals  Eyes: BHAVANI  Neck: NO MASSES. NO ADENOPATHY. THYROID NOT ENLARGED. Cardiovascular:  HEART REGULAR, NO MURMUR. NO CAROTID BRUIT  Respiratory:  BREATHING COMFORTABLY. LUNGS CLEAR. NO WHEEZES  Abdomen:  SOFT. NO TENDERNESS. LIVER AND SPLEEN NOT PALPABLE. NO OTHER MASSES FELT. Extremities: NO EDEMA. NO CALF TENDERNESS. Rheumatologic ; NO KNEE SWELLING. MOVEMENTS OF KNEE MILDLY PAINFUL. Peripheral Vascular:  PEDAL PULSUS GOOD  Neurological:   MONO FILAMENT SENSATIONS DIMINISHED BOTH FEET. REFLEXES NORMAL BOTH SIDES. Psyche : PATIENT FEELS  STRESSED .  MOOD  AND AFFECT NORMAL        Assessment:     LAB / IMAGING:    POCT A1C 5/13/19,  7.8 %    POCT  A1C 5/13/19    POCT A1C  8.5% , 2/13/19    POCT A1C 1/16/19,  10.2%     CBC auto differential (01/14/2019 9:41 AM EST)  CBC auto differential (01/14/2019 9:41 AM EST)   Component Value Ref Range Performed At   [facility] Blood Cells 6.4 4.0 - 11.8 [address]   RBC count 4.59 3.80 - 5.20 [address] [facility] LABORATORY   Hemoglobin 13.5 11.7 - 16.0 g/dL [facility] LABORATORY   Hematocrit 39.7 35 - 47 % [facility] LABORATORY   MCV 87 81 - 100 fL [facility] LABORATORY   MCH 29.5 26 - 33.5 pg [address]   MCHC 34.1 32 - 36 g/dL [facility] LABORATORY   RDW 13.8 11.5 - 14.7 % [facility] LABORATORY   Platelets 711 657 - [address]   MPV 8.4 7 - 12 fL [facility] LABORATORY   % neutrophils 49.6 % [facility] LABORATORY   % [place] LABORATORY   Specific gravity 1.031 1.003 - 1.035 [facility] 1729   Nitrite Negative Negative [facility]   Ph urine 5.5 5.0 - 8.5 [facility]   Leukocyte esterase NegativeComment: HIGH CONCENTRATIONS OF GLUCOSE MAY DECREASE THE REACTIVITY OF THE DIPSTICK LEUKOCYTE TEST PAD.  Negative [address]   Protein Negative Negative mg/dL [facility]   Glucose, Ur >1000 (A) Negative mg/dL [facility] [address]   Ketones urine Negative Negative mg/dL [facility] 1729   Urobilinogen 0.2 <1.1 eu/dL [facility] 1729   Bilirubin, urine Negative Negative [facility]   Hemoglobin Negative         TSH (01/14/2019 9:41 AM EST)  TSH (01/14/2019 9:41 AM EST)   Component Value Ref Range Performed At   TSH 2.93 0.49 - 4.67 uIU/mL [facility] LABORATORY     Microalbumin / creatinine urine ratio (01/14/2019 9:41 AM EST)  Microalbumin / creatinine urine ratio (01/14/2019 9:41 AM EST)   Component Value Ref Range Performed At   Microalbumin urine 1.4 0.0 - 1.9 mg/dL [facility]   Urine creat 143.38 mg/dL [facility] 1729   Alb/creat ratio 9.8 0.0 - 30.0 mg/g creat [facility] LABORATORY       Lipid profile (01/14/2019 9:41 AM EST)  Lipid profile (01/14/2019 9:41 AM EST)   Component Value Ref Range Performed At   Cholesterol 135 (L) 150 - 200 mg/dL [facility] LABORATORY   Triglycerides 62 27 - 150 mg/dL [facility]   HDL 50  Comment:         HDL <40 mg/dL - High Risk  HDL > or = 40mg/dL- Desirable  HDL >60 mg/dL - Negative Risk  ---------------------------------------------   >39 mg/dL [facility] 1729   Very low lipoprotein 12 0 - 30 mg/dL [facility] LABORATORY   LDL (calc) 73  Comment:         LDL    <100 mg/dL - Desirable  LDL    >160 mg/dL - High Risk  ---------------------------------------------   <130 mg/dL [facility] [address]                              POCT A1C 10/15/18,  9.1%    PREVIOUS LAB    POCT A1C 9.1 ON 7/6/18     LAB  FROM 12/5/17     TSH 2.82  BUN 19, CREAT 0.70, LYTES 139/3.8, CA 9.7, GLU  188, ALB 4.1, ALT 13  CHOL 150, TRIG 66, HDL 48, LDL 89  MICROALB 24 MG/G  UA 23 WBC  CBC  HGB 13.2, WBC 83, PLAT 177  A1C 8.8          IMPRESSION :     TYPE I DIABETES ON INSULIN PUMP . UNCONTROLLED. POCT A1C 5/13/19,  7.8 %  HYPOTHYROIDISM  S/P THYROID NODULE RESECTION BENIGN. ON LEVOTHYROXINE   HYPERTENSION  HYPERCHOLESTEROLEMIA  DEPRESSION  HX OF GASTROPARESIS  AND ACID REFLUX ON PRILOSEC  CARAFATE, AND REGLAN. S  HX OF RETINOPATHY AND NEUROPATHY  OBESITY          1. Type 1 diabetes mellitus with hyperglycemia (HCC)    2. Insulin pump in place    3. Postoperative hypothyroidism    4. Essential hypertension    5. Hypercholesterolemia    6. Depression, unspecified depression type    7. Gastroesophageal reflux disease without esophagitis              Plan:     1. A1C RESULTS  FROM TODAY DISCUSSED  2. CONTINUE  PRESENT BASAL SETTINGS 12 MID NIGHT  1.5  U/HR, CONTINUE  8 A.M 1.7 U/HR,  CHANGE 10 P.M TO  1.7  U/HR. SENSITIVITY 40, [employer]   .  3. CHANGE CARB RATIO  TO  7 GM AT BREAKFAST LUNCH AND SUPPER. PATIENT IS ON NOVOLOG  VIALS  4. CONTINUE ALL OTHER MEDS AS REVIEWED AND DISCUSSED. 5. WATCH DIET AND EXERCISE AS TOLERATED. 6. MONITOR FINGER STICK  SUGARS  2 TIMES DAILY. CALL IF ANY PROBLEMS. 7. CONTINUE  P[address]DEMI[address]. COVERED.   8. RETURN  3 MONTHS    Orders Placed This Encounter   Procedures    POCT glycosylated hemoglobin (Hb A1C)     Orders Placed This Encounter   Medications    sertraline (ZOLOFT) 100 MG tablet     Sig: take 1 tablet by mouth once daily     Dispense:  90 tablet     Refill:  1       Return in about 3 months (around 8/13/2019) for DIABETES ON INSULIN PUMP. Visit date not found         Patientgiven educational materials - see patient instructions. Discussed use, benefit,and side effects of prescribed medications. All patient questions answered. Ptvoiced understanding. Reviewed health maintenance. Instructed to continue currentmedications, diet and exercise. Patient agreed with treatment plan. Follow up asdirected.      Electronically signed by Kailey Jensen MD on 10/15/18 at 2:30 PM

## 2019-05-14 RX ORDER — ATORVASTATIN CALCIUM 40 MG/1
TABLET, FILM COATED ORAL
Qty: 90 TABLET | Refills: 1 | Status: SHIPPED | OUTPATIENT
Start: 2019-05-14 | End: 2019-11-09 | Stop reason: SDUPTHER

## 2019-07-08 RX ORDER — LOSARTAN POTASSIUM AND HYDROCHLOROTHIAZIDE 12.5; 5 MG/1; MG/1
TABLET ORAL
Qty: 90 TABLET | Refills: 1 | Status: SHIPPED | OUTPATIENT
Start: 2019-07-08 | End: 2020-01-02

## 2019-07-29 RX ORDER — ISOSORBIDE MONONITRATE 60 MG/1
TABLET, EXTENDED RELEASE ORAL
Qty: 90 TABLET | Refills: 0 | Status: SHIPPED | OUTPATIENT
Start: 2019-07-29

## 2019-07-31 DIAGNOSIS — E89.0 POSTOPERATIVE HYPOTHYROIDISM: ICD-10-CM

## 2019-07-31 RX ORDER — LEVOTHYROXINE SODIUM 88 UG/1
TABLET ORAL
Qty: 30 TABLET | Refills: 5 | Status: SHIPPED | OUTPATIENT
Start: 2019-07-31 | End: 2020-01-27

## 2019-08-07 NOTE — PROGRESS NOTES
Dr. [facility]  [id number] [facility]. Kacey Miller is a 61 y.o. female who presents today for   Chief Complaint   Patient presents with    3 Month Follow-Up     diabetes       /80 (Site: Left Upper Arm, Position: Sitting, Cuff Size: Medium Adult)   Pulse 82 Comment: regular  Temp 97.5 °F (36.4 °C) (Skin)   Resp 20   Ht 5' 2.01\" (1.575 m)   Wt 186 lb 6.4 oz (84.6 kg)   SpO2 97%   Breastfeeding? No   BMI 34.08 kg/m²     Wt Readings from Last 3 Encounters:   08/12/19 186 lb 6.4 oz (84.6 kg)   05/13/19 191 lb (86.6 kg)   02/13/19 186 lb (84.4 kg)     Body mass index is 34.08 kg/m². BP Readings from Last 3 Encounters:   08/12/19 130/80   05/13/19 110/64   02/13/19 98/78       Health Maintenance Due   Topic Date Due    Hepatitis C screen  1955    HIV screen  09/27/1970    DTaP/Tdap/Td vaccine (1 - Tdap) 09/27/1974    Cervical cancer screen  09/27/1976    Breast cancer screen  09/27/2005    Shingles Vaccine (1 of 2) 09/27/2005    Diabetic retinal exam  03/27/2018    Annual Wellness Visit (AWV)  09/27/2018    Diabetic microalbuminuria test  12/05/2018    Lipid screen  12/05/2018    Potassium monitoring  12/05/2018    Creatinine monitoring  12/05/2018    Diabetic foot exam  07/06/2019       HPI:     HPI AND REVIEW OF SYSTEMS     PATIENT COMES IN FOR FOLLOW UP OF      TYPE I DIABETES ON INSULIN PUMP  HYPERTENSION  HYPERCHOLESTEROLEMIA  HX OF GASTROPARESIS  AND ACID REFLUX  HYPOTHYROIDISM  S/P THYROID NODULE RESECTION BENIGN. ON LEVOTHYROXINE    HX OF RETINOPATHY AND NEUROPATHY  OBESITY    CURRENT MEDS REVIEWED AND  PREVIOOUS  VISIT FROM  5/13/19  REVIEWED.     ON INSULIN PUMP BASAL AND BOLUS SETTINGS REVIEWED. USING FREESTYLE SWETHA CONTINUOUS GLUCOSE MONITORING. SUGARS REVIEWED AND DISCUSSED. RUNNING HIGH IN LAS 1 WEEK. 300 RANGE. BEFORE THAT WERE IN GOOD RANGE.   NO LOW

## 2019-08-12 ENCOUNTER — OFFICE VISIT (OUTPATIENT)
Dept: ENDOCRINOLOGY | Age: 64
End: 2019-08-12
Payer: COMMERCIAL

## 2019-08-12 VITALS
BODY MASS INDEX: 34.3 KG/M2 | HEART RATE: 82 BPM | WEIGHT: 186.4 LBS | RESPIRATION RATE: 20 BRPM | OXYGEN SATURATION: 97 % | SYSTOLIC BLOOD PRESSURE: 130 MMHG | HEIGHT: 62 IN | TEMPERATURE: 97.5 F | DIASTOLIC BLOOD PRESSURE: 80 MMHG

## 2019-08-12 DIAGNOSIS — E89.0 POSTOPERATIVE HYPOTHYROIDISM: ICD-10-CM

## 2019-08-12 DIAGNOSIS — E78.00 HYPERCHOLESTEROLEMIA: ICD-10-CM

## 2019-08-12 DIAGNOSIS — K31.84 DIABETIC GASTROPARESIS (HCC): ICD-10-CM

## 2019-08-12 DIAGNOSIS — E11.43 DIABETIC GASTROPARESIS (HCC): ICD-10-CM

## 2019-08-12 DIAGNOSIS — I10 ESSENTIAL HYPERTENSION: ICD-10-CM

## 2019-08-12 DIAGNOSIS — Z96.41 INSULIN PUMP IN PLACE: ICD-10-CM

## 2019-08-12 DIAGNOSIS — E10.65 TYPE 1 DIABETES MELLITUS WITH HYPERGLYCEMIA (HCC): Primary | ICD-10-CM

## 2019-08-12 LAB — HBA1C MFR BLD: 8.8 %

## 2019-08-12 PROCEDURE — 83036 HEMOGLOBIN GLYCOSYLATED A1C: CPT | Performed by: INTERNAL MEDICINE

## 2019-08-12 PROCEDURE — 99214 OFFICE O/P EST MOD 30 MIN: CPT | Performed by: INTERNAL MEDICINE

## 2019-10-04 ENCOUNTER — TELEPHONE (OUTPATIENT)
Dept: ENDOCRINOLOGY | Age: 64
End: 2019-10-04

## 2019-10-08 RX ORDER — FLASH GLUCOSE SENSOR
KIT MISCELLANEOUS
Qty: 1 EACH | Refills: 0 | Status: SHIPPED | OUTPATIENT
Start: 2019-10-08 | End: 2019-11-18 | Stop reason: SDUPTHER

## 2019-10-25 ENCOUNTER — TELEPHONE (OUTPATIENT)
Dept: ENDOCRINOLOGY | Age: 64
End: 2019-10-25

## 2019-11-04 RX ORDER — SERTRALINE HYDROCHLORIDE 100 MG/1
TABLET, FILM COATED ORAL
Qty: 90 TABLET | Refills: 1 | Status: SHIPPED | OUTPATIENT
Start: 2019-11-04 | End: 2020-04-29

## 2019-11-11 RX ORDER — ATORVASTATIN CALCIUM 40 MG/1
TABLET, FILM COATED ORAL
Qty: 90 TABLET | Refills: 1 | Status: SHIPPED | OUTPATIENT
Start: 2019-11-11 | End: 2020-02-20

## 2019-11-18 RX ORDER — FLASH GLUCOSE SENSOR
KIT MISCELLANEOUS
Qty: 2 EACH | Refills: 0 | Status: SHIPPED | OUTPATIENT
Start: 2019-11-18 | End: 2020-01-06

## 2019-11-19 ENCOUNTER — OFFICE VISIT (OUTPATIENT)
Dept: ENDOCRINOLOGY | Age: 64
End: 2019-11-19
Payer: COMMERCIAL

## 2019-11-19 VITALS
HEIGHT: 62 IN | DIASTOLIC BLOOD PRESSURE: 74 MMHG | HEART RATE: 88 BPM | BODY MASS INDEX: 34.16 KG/M2 | WEIGHT: 185.6 LBS | SYSTOLIC BLOOD PRESSURE: 118 MMHG

## 2019-11-19 DIAGNOSIS — K31.84 DIABETIC GASTROPARESIS (HCC): ICD-10-CM

## 2019-11-19 DIAGNOSIS — F32.A DEPRESSION, UNSPECIFIED DEPRESSION TYPE: ICD-10-CM

## 2019-11-19 DIAGNOSIS — E11.43 DIABETIC GASTROPARESIS (HCC): ICD-10-CM

## 2019-11-19 DIAGNOSIS — Z96.41 INSULIN PUMP IN PLACE: ICD-10-CM

## 2019-11-19 DIAGNOSIS — E78.00 HYPERCHOLESTEROLEMIA: ICD-10-CM

## 2019-11-19 DIAGNOSIS — E10.65 TYPE 1 DIABETES MELLITUS WITH HYPERGLYCEMIA (HCC): Primary | ICD-10-CM

## 2019-11-19 DIAGNOSIS — E89.0 POSTOPERATIVE HYPOTHYROIDISM: ICD-10-CM

## 2019-11-19 DIAGNOSIS — I10 ESSENTIAL HYPERTENSION: ICD-10-CM

## 2019-11-19 LAB — HBA1C MFR BLD: 9 %

## 2019-11-19 PROCEDURE — 83036 HEMOGLOBIN GLYCOSYLATED A1C: CPT | Performed by: INTERNAL MEDICINE

## 2019-11-19 PROCEDURE — 99214 OFFICE O/P EST MOD 30 MIN: CPT | Performed by: INTERNAL MEDICINE

## 2019-12-05 RX ORDER — METOCLOPRAMIDE 5 MG/1
TABLET ORAL
Qty: 60 TABLET | Refills: 2 | Status: SHIPPED | OUTPATIENT
Start: 2019-12-05 | End: 2020-02-27

## 2020-01-02 RX ORDER — LOSARTAN POTASSIUM AND HYDROCHLOROTHIAZIDE 12.5; 5 MG/1; MG/1
TABLET ORAL
Qty: 90 TABLET | Refills: 1 | Status: SHIPPED | OUTPATIENT
Start: 2020-01-02 | End: 2020-03-27

## 2020-01-02 NOTE — TELEPHONE ENCOUNTER
Last visit: 11/19/19  Last Med refill:   Does patient have enough medication for 72 hours:     Next Visit Date:  Future Appointments   Date Time Provider Madonna Bela   2/26/2020 12:45 PM Ava Nix MD [address] Maintenance   Topic Date Due    Hepatitis C screen  1955    DTaP/Tdap/Td vaccine (1 - Tdap) 09/27/1966    HIV screen  09/27/1970    Cervical cancer screen  09/27/1976    Breast cancer screen  09/27/2005    Shingles Vaccine (1 of 2) 09/27/2005    Diabetic retinal exam  03/27/2018    Diabetic microalbuminuria test  12/05/2018    Lipid screen  12/05/2018    Potassium monitoring  12/05/2018    Creatinine monitoring  12/05/2018    Annual Wellness Visit (AWV)  06/23/2019    Diabetic foot exam  07/06/2019    Flu vaccine (1) 09/01/2019    A1C test (Diabetic or Prediabetic)  02/19/2020    Colon cancer screen colonoscopy  09/14/2026    Pneumococcal 0-64 years Vaccine  Completed       Hemoglobin A1C (%)   Date Value   11/19/2019 9.0   08/12/2019 8.8   02/13/2019 8.5             ( goal A1C is < 7)   Microalb/Crt.  Ratio (mcg/mg creat)   Date Value   09/13/2016 7     LDL Cholesterol (mg/dL)   Date Value   09/13/2016 72   10/06/2015 80     LDL Calculated (mg/dL)   Date Value   12/05/2017 89       (goal LDL is <100)   AST (U/L)   Date Value   09/13/2016 19     ALT (U/L)   Date Value   09/13/2016 21     BUN (mg/dL)   Date Value   11/14/2016 17     BP Readings from Last 3 Encounters:   11/19/19 118/74   08/12/19 130/80   05/13/19 110/64          (goal 120/80)    All Future Testing planned in [facility]  Lab Frequency Next Occurrence               Patient Active Problem List:     Degeneration of lumbar or lumbosacral intervertebral disc     Postlaminectomy syndrome, lumbar region     Osteoarthritis     CAD (coronary artery disease)     Restless legs syndrome     Sleep apnea     Chest pressure     Gastroesophageal reflux disease without esophagitis     Bloated abdomen     Diabetes ([address]     Insulin pump in place     Headache     Pure hypercholesterolemia     Diabetic polyneuropathy ([address]     Ulcer of lower extremity ([address]

## 2020-01-06 RX ORDER — FLASH GLUCOSE SENSOR
KIT MISCELLANEOUS
Qty: 2 EACH | Refills: 0 | Status: SHIPPED | OUTPATIENT
Start: 2020-01-06 | End: 2020-02-18

## 2020-01-06 NOTE — TELEPHONE ENCOUNTER
Last visit: 11/19/19  Last Med refill:   Does patient have enough medication for 72 hours:     Next Visit Date:  Future Appointments   Date Time Provider Madonna Owusui   2/26/2020 12:45 PM Shayne Ann MD [address] Maintenance   Topic Date Due    Hepatitis C screen  1955    DTaP/Tdap/Td vaccine (1 - Tdap) 09/27/1966    HIV screen  09/27/1970    Cervical cancer screen  09/27/1976    Breast cancer screen  09/27/2005    Shingles Vaccine (1 of 2) 09/27/2005    Diabetic retinal exam  03/27/2018    Diabetic microalbuminuria test  12/05/2018    Lipid screen  12/05/2018    Potassium monitoring  12/05/2018    Creatinine monitoring  12/05/2018    Annual Wellness Visit (AWV)  06/23/2019    Diabetic foot exam  07/06/2019    Flu vaccine (1) 09/01/2019    A1C test (Diabetic or Prediabetic)  02/19/2020    Colon cancer screen colonoscopy  09/14/2026    Pneumococcal 0-64 years Vaccine  Completed       Hemoglobin A1C (%)   Date Value   11/19/2019 9.0   08/12/2019 8.8   02/13/2019 8.5             ( goal A1C is < 7)   Microalb/Crt.  Ratio (mcg/mg creat)   Date Value   09/13/2016 7     LDL Cholesterol (mg/dL)   Date Value   09/13/2016 72   10/06/2015 80     LDL Calculated (mg/dL)   Date Value   12/05/2017 89       (goal LDL is <100)   AST (U/L)   Date Value   09/13/2016 19     ALT (U/L)   Date Value   09/13/2016 21     BUN (mg/dL)   Date Value   11/14/2016 17     BP Readings from Last 3 Encounters:   11/19/19 118/74   08/12/19 130/80   05/13/19 110/64          (goal 120/80)    All Future Testing planned in [facility]  Lab Frequency Next Occurrence               Patient Active Problem List:     Degeneration of lumbar or lumbosacral intervertebral disc     Postlaminectomy syndrome, lumbar region     Osteoarthritis     CAD (coronary artery disease)     Restless legs syndrome     Sleep apnea     Chest pressure     Gastroesophageal reflux disease without esophagitis     Bloated

## 2020-01-27 RX ORDER — LEVOTHYROXINE SODIUM 88 UG/1
TABLET ORAL
Qty: 90 TABLET | Refills: 2 | Status: SHIPPED | OUTPATIENT
Start: 2020-01-27

## 2020-02-18 RX ORDER — FLASH GLUCOSE SENSOR
KIT MISCELLANEOUS
Qty: 2 EACH | Refills: 0 | Status: SHIPPED | OUTPATIENT
Start: 2020-02-18 | End: 2020-03-10

## 2020-02-18 NOTE — TELEPHONE ENCOUNTER
Osteoarthritis     CAD (coronary artery disease)     Restless legs syndrome     Sleep apnea     Chest pressure     Gastroesophageal reflux disease without esophagitis     Bloated abdomen     Diabetes (HCC)     Insulin pump in place     Headache     Pure hypercholesterolemia     Diabetic polyneuropathy ([address]     Ulcer of lower extremity ([facility]

## 2020-02-20 RX ORDER — ATORVASTATIN CALCIUM 40 MG/1
TABLET, FILM COATED ORAL
Qty: 90 TABLET | Refills: 1 | Status: SHIPPED | OUTPATIENT
Start: 2020-02-20 | End: 2020-05-14

## 2020-02-27 RX ORDER — METOCLOPRAMIDE 5 MG/1
TABLET ORAL
Qty: 60 TABLET | Refills: 5 | Status: SHIPPED | OUTPATIENT
Start: 2020-02-27

## 2020-03-10 RX ORDER — FLASH GLUCOSE SENSOR
KIT MISCELLANEOUS
Qty: 2 EACH | Refills: 2 | Status: SHIPPED | OUTPATIENT
Start: 2020-03-10 | End: 2020-06-22

## 2020-03-27 RX ORDER — LOSARTAN POTASSIUM AND HYDROCHLOROTHIAZIDE 12.5; 5 MG/1; MG/1
TABLET ORAL
Qty: 90 TABLET | Refills: 1 | Status: SHIPPED | OUTPATIENT
Start: 2020-03-27 | End: 2020-06-23

## 2020-04-09 ENCOUNTER — TELEPHONE (OUTPATIENT)
Dept: ENDOCRINOLOGY | Age: 65
End: 2020-04-09

## 2020-04-29 RX ORDER — SERTRALINE HYDROCHLORIDE 100 MG/1
TABLET, FILM COATED ORAL
Qty: 90 TABLET | Refills: 0 | Status: SHIPPED | OUTPATIENT
Start: 2020-04-29 | End: 2020-07-27

## 2020-04-29 NOTE — TELEPHONE ENCOUNTER
pressure     Gastroesophageal reflux disease without esophagitis     Bloated abdomen     Diabetes (HCC)     Insulin pump in place     Headache     Pure hypercholesterolemia     Diabetic polyneuropathy ([address]     Ulcer of lower extremity ([address]

## 2020-05-14 RX ORDER — ATORVASTATIN CALCIUM 40 MG/1
TABLET, FILM COATED ORAL
Qty: 90 TABLET | Refills: 1 | Status: SHIPPED | OUTPATIENT
Start: 2020-05-14

## 2020-06-14 NOTE — PROGRESS NOTES
Alexandra Gates CONTINUE CARB RATIO  7 GM AT BREAKFAST LUNCH AND SUPPER. PATIENT IS ON NOVOLOG  VIALS  USING FREESTYLE SWETHA CONTINUOUS GLUCOSE MONITORING. SUGARS REVIEWED AND DISCUSSED. RUNNING  82   RANGE. NO LOW SUGAR REACTIONS    WEIGHT UP 8 LB SINCE  11/11/19. FEELS WATER RETENTION    NO POLY URIA OR POLYDYPSIA. HAS OCCASIONAL URINARY INCONTINENCE. NO VISION CHANGES. HAS PARESTHESIAS IN FEET. HAS LOW BACK PAIN WITH SCIATICA. Alexandra Gates GOES TO PAIN CLINIC  NO CLAUDICATION IN LEGS. NO DIZZINESS. NO CHEST PAIN  NO SHORTNESS OF BREATH  NO ABDOMINAL PAIN  ACID REFLUX  AND HX OF GASTROPARESIS  BETTER WITH CARAFATE, REGLAN AND PRILOSEC     OTHER PROBLEMS  :     CHRONIC LOW BACK PAIN  DEPRESSION  DEG ARTHRITIS KNEES GETS PERIODIC  STEROID INJ            Past Medical History:   Diagnosis Date    CAD (coronary artery disease)     \"40 % blockage lower heart\"    Cataracts, bilateral     Chest pain     pressure    Chronic back pain     Depression     Diabetes ([address]     Diabetic polyneuropathy ([address]     EKG abnormalities 11/14/2016    RT. BBB    GERD (gastroesophageal reflux disease)     Headache     Hyperlipidemia     Hypertension     Hypothyroidism     Insulin pump in place     Neuropathy     Osteoarthritis     Peripheral vascular disease (HCC)     Pure hypercholesterolemia     Restless legs syndrome     Sleep apnea     Ulcer of lower extremity ([address]     Wears glasses        Past Surgical History:   Procedure Laterality Date    BACK SURGERY      cage in place at L5-S1., SPINAL CORD STIMULATOR    CARPAL TUNNEL RELEASE Bilateral     CATARACT REMOVAL WITH IMPLANT Right 05/16/2017    CATARACT REMOVAL WITH IMPLANT Left 06/13/2017    DR SHARON HERRERA    CHOLECYSTECTOMY      COLONOSCOPY  07/13/2004    normal    COLONOSCOPY  09/14/2016    no lesions seen, spasms sigmoid colon    CYST REMOVAL Right     GANGLION CYST REMOVED.     ENDOSCOPY, COLON, DIAGNOSTIC      EGD    JOINT REPLACEMENT Left     knee    OTHER

## 2020-06-16 ENCOUNTER — OFFICE VISIT (OUTPATIENT)
Dept: ENDOCRINOLOGY | Age: 65
End: 2020-06-16
Payer: COMMERCIAL

## 2020-06-16 VITALS
SYSTOLIC BLOOD PRESSURE: 124 MMHG | HEART RATE: 72 BPM | OXYGEN SATURATION: 97 % | DIASTOLIC BLOOD PRESSURE: 64 MMHG | BODY MASS INDEX: 35.59 KG/M2 | WEIGHT: 193.4 LBS | TEMPERATURE: 97.2 F | HEIGHT: 62 IN

## 2020-06-16 LAB — HBA1C MFR BLD: 9.2 %

## 2020-06-16 PROCEDURE — 83036 HEMOGLOBIN GLYCOSYLATED A1C: CPT | Performed by: INTERNAL MEDICINE

## 2020-06-16 PROCEDURE — 99214 OFFICE O/P EST MOD 30 MIN: CPT | Performed by: INTERNAL MEDICINE

## 2020-06-16 RX ORDER — FUROSEMIDE 20 MG/1
TABLET ORAL
Qty: 8 TABLET | Refills: 0 | Status: SHIPPED | OUTPATIENT
Start: 2020-06-16 | End: 2020-07-20

## 2020-06-22 RX ORDER — FLASH GLUCOSE SENSOR
KIT MISCELLANEOUS
Qty: 1 EACH | Refills: 1 | Status: SHIPPED | OUTPATIENT
Start: 2020-06-22

## 2020-06-23 RX ORDER — LOSARTAN POTASSIUM AND HYDROCHLOROTHIAZIDE 12.5; 5 MG/1; MG/1
TABLET ORAL
Qty: 90 TABLET | Refills: 1 | Status: ON HOLD | OUTPATIENT
Start: 2020-06-23 | End: 2021-06-04 | Stop reason: HOSPADM

## 2020-07-20 ENCOUNTER — TELEPHONE (OUTPATIENT)
Dept: ENDOCRINOLOGY | Age: 65
End: 2020-07-20

## 2020-07-20 NOTE — TELEPHONE ENCOUNTER
Patient states that Dr Colt Cruz discussed her getting labs done at last apt on 6/16/20. Patient requests an order for labs prior to next f/u apt on Mon 7/27/20. Please call with update.

## 2020-07-22 NOTE — PROGRESS NOTES
Dr. Mar[facility]  [address] [place] [address] Rd., [address]. Chalino Tyler is a 59 y.o. female who presents today for   Chief Complaint   Patient presents with    Diabetes       /60   Pulse 74 Comment: REGULAR  Ht 5' 2.4\" (1.585 m)   Wt 188 lb 9.6 oz (85.5 kg)   BMI 34.05 kg/m²     Wt Readings from Last 3 Encounters:   07/27/20 188 lb 9.6 oz (85.5 kg)   06/16/20 193 lb 6.4 oz (87.7 kg)   11/19/19 185 lb 9.6 oz (84.2 kg)     Body mass index is 34.05 kg/m². BP Readings from Last 3 Encounters:   07/27/20 118/60   06/16/20 124/64   11/19/19 118/74       Health Maintenance Due   Topic Date Due    Hepatitis C screen  1955    HIV screen  09/27/1970    DTaP/Tdap/Td vaccine (1 - Tdap) 09/27/1974    Cervical cancer screen  09/27/1976    Breast cancer screen  09/27/2005    Shingles Vaccine (1 of 2) 09/27/2005    Diabetic retinal exam  03/27/2018    Diabetic microalbuminuria test  12/05/2018    Lipid screen  12/05/2018    Potassium monitoring  12/05/2018    Creatinine monitoring  12/05/2018    Annual Wellness Visit (AWV)  06/23/2019    Diabetic foot exam  07/06/2019       HPI:     HPI AND REVIEW OF SYSTEMS     PATIENT COMES IN FOR FOLLOW UP OF      TYPE I DIABETES ON INSULIN PUMP  HYPERTENSION  HYPERCHOLESTEROLEMIA  HX OF GASTROPARESIS  AND ACID REFLUX  HYPOTHYROIDISM  S/P THYROID NODULE RESECTION BENIGN. ON LEVOTHYROXINE    HX OF RETINOPATHY AND NEUROPATHY  OBESITY      CURRENT MEDS REVIEWED AND  PREVIOUS  VISIT FROM  6/16/20  REVIEWED. ON INSULIN PUMP BASAL AND BOLUS SETTINGS REVIEWED. BASAL SETTING 12 MID NIGHT  1.5  U/HR,   8 A.M  BASAL RATE  1.9  U/HR, 10 P. M  BASAL RATE  1.8  U/HR. SENSITIVITY 40, [facility]ARGET  100-120 . CONTINUE CARB RATIO  7 GM AT BREAKFAST LUNCH AND SUPPER. PATIENT IS ON NOVOLOG  VIALS  USING FREESTYLE SWETHA CONTINUOUS GLUCOSE MONITORING. SUGARS REVIEWED AND DISCUSSED.  A.M SUGARS ARE  HIGHER. NO NOCTURNAL LOW  SUGARS. DURING DAY SUGARS ARE BETTER. WEIGHT  DOWN  5 LB SINCE LAST VISIT     NO POLY URIA OR POLYDYPSIA. HAS OCCASIONAL URINARY INCONTINENCE. NO VISION CHANGES. HAS PARESTHESIAS IN FEET. HAS LOW BACK PAIN WITH SCIATICA. Linwood Lopez GOES TO PAIN CLINIC  NO CLAUDICATION IN LEGS. NO DIZZINESS. NO CHEST PAIN  NO SHORTNESS OF BREATH  NO ABDOMINAL PAIN  ACID REFLUX  AND HX OF GASTROPARESIS  BETTER WITH CARAFATE, REGLAN AND PRILOSEC     OTHER PROBLEMS  :     CHRONIC LOW BACK PAIN  DEPRESSION  DEG ARTHRITIS KNEES GETS PERIODIC  STEROID INJ            Past Medical History:   Diagnosis Date    CAD (coronary artery disease)     \"40 % blockage lower heart\"    Cataracts, bilateral     Chest pain     pressure    Chronic back pain     Depression     Diabetes ([address]     Diabetic polyneuropathy ([address]     EKG abnormalities 11/14/2016    RT. BBB    GERD (gastroesophageal reflux disease)     Headache     Hyperlipidemia     Hypertension     Hypothyroidism     Insulin pump in place     Neuropathy     Osteoarthritis     Peripheral vascular disease (HCC)     Pure hypercholesterolemia     Restless legs syndrome     Sleep apnea     Ulcer of lower extremity ([address]     Wears glasses        Past Surgical History:   Procedure Laterality Date    BACK SURGERY      cage in place at L5-S1., SPINAL CORD STIMULATOR    CARPAL TUNNEL RELEASE Bilateral     CATARACT REMOVAL WITH IMPLANT Right 05/16/2017    CATARACT REMOVAL WITH IMPLANT Left 06/13/2017    DR SHARON HERRERA    CHOLECYSTECTOMY      COLONOSCOPY  07/13/2004    normal    COLONOSCOPY  09/14/2016    no lesions seen, spasms sigmoid colon    CYST REMOVAL Right     GANGLION CYST REMOVED.     ENDOSCOPY, COLON, DIAGNOSTIC      EGD    JOINT REPLACEMENT Left     knee    OTHER SURGICAL HISTORY  11/21/2016    Revision of spinal cord stimulator    THYROID LOBECTOMY      UPPER GASTROINTESTINAL ENDOSCOPY  08/16/2016    NO LESIONS SEEN     Family History Problem Relation Age of Onset    Heart Disease Mother     Dementia Mother     Stroke Mother     Heart Disease Father     COPD Father     Heart Disease Brother     Arthritis Brother     Other Brother         AAA    Other Brother         AAA     Social History     Tobacco Use    Smoking status: Never Smoker    Smokeless tobacco: Never Used   Substance Use Topics    Alcohol use: No        Current Outpatient Medications   Medication Sig Dispense Refill    furosemide (LASIX) 20 MG tablet take 1 tablet by mouth two times a week if needed for water retention 8 tablet 0    losartan-hydroCHLOROthiazide (HYZAAR) 50-12.5 MG per tablet take 1 tablet by mouth once daily 90 tablet 1    Continuous Blood Gluc Sensor (FREESTYLE SWETHA 14 DAY SENSOR) [facility] APPLY 1 SENSOR TO THE BACK OF ARM. REMOVE AND REPLACE EVERY 14 DAYS. USE WITH DEVICE TO MONITOR BLOOD SUGAR 1 each 1    atorvastatin (LIPITOR) 40 MG tablet take 1 tablet by mouth once daily 90 tablet 1    sertraline (ZOLOFT) 100 MG tablet take 1 tablet by mouth once daily 90 tablet 0    metoclopramide (REGLAN) 5 MG tablet take 1 tablet by mouth twice a day 60 tablet 5    insulin aspart (NOVOLOG) 100 UNIT/ML injection vial INJECT 60 UNITS VIA PUMP ONCE DAILY 60 mL 3    levothyroxine (SYNTHROID) 88 MCG tablet take 1 tablet by mouth once daily 90 tablet 2    isosorbide mononitrate (IMDUR) 60 MG extended release tablet take 1 tablet by mouth once daily 90 tablet 0    metoprolol tartrate (LOPRESSOR) 25 MG tablet take 1 tablet by mouth twice a day 60 tablet 0    HYDROcodone-acetaminophen (NORCO) 7.5-325 MG per tablet take 1 tablet by mouth every 6 hours if needed  0    Continuous Blood Gluc  (FREESTYLE SWETHA 14 DAY READER) SYBIL USE AS DIRECTED.  GIVE SENSORS ALSO 1 Device 0    sucralfate (CARAFATE) 1 GM tablet Take 1 tablet by mouth 4 times daily 120 tablet 2    aspirin 81 MG tablet Take by mouth daily      FOLIC ACID PO Take by mouth      cyclobenzaprine (FLEXERIL) 5 MG tablet Take 5 mg by mouth 3 times daily as needed for Muscle spasms      gabapentin (NEURONTIN) 400 MG capsule Take 400 mg by mouth 3 times daily      Omega-3 Fatty Acids (FISH OIL) 1000 MG CPDR Take  by mouth. No current facility-administered medications for this visit. Allergies   Allergen Reactions    Actos [Pioglitazone] Other (See Comments)     Weight gain    Lisinopril Other (See Comments)     cough    Metformin And Related Diarrhea    Zithromax [Azithromycin] Itching       Subjective:      Review of Systems      AS NOTED IN HPI        Objective:      Physical Exam     PATIENT IS OBESE. NO DISTRESS. Reviewed Vitals DISCUSSED WEIGHT  DOWN 5 LB SINCE LAST VISIT ON 6/16/20   Eyes: BHAVANI  Neck: NO MASSES. NO ADENOPATHY. THYROID NOT ENLARGED. Cardiovascular:  HEART REGULAR, NO MURMUR. NO CAROTID BRUIT  Respiratory:  BREATHING COMFORTABLY. LUNGS CLEAR. NO WHEEZES  Abdomen:  SOFT. NO TENDERNESS. LIVER AND SPLEEN NOT PALPABLE. NO OTHER MASSES FELT. Extremities: NO PITTING  EDEMA. NO CALF TENDERNESS. Rheumatologic ; NO KNEE SWELLING. MOVEMENTS OF KNEE MILDLY PAINFUL. Peripheral Vascular:  PEDAL PULSUS GOOD  Neurological:   MONO FILAMENT SENSATIONS DIMINISHED BOTH FEET. REFLEXES NORMAL BOTH SIDES.    Psyche :  MOOD  AND AFFECT NORMAL        Assessment:     LAB / IMAGING:    Urinalysis (07/27/2020 9:54 AM EDT)  Urinalysis (07/27/2020 9:54 AM EDT)   Component Value Ref Range Performed At Pathologist Signature   Color YELLOW YELLOW^YELLOW [facility]     Turbidity CLEAR CLEAR^CLEAR [facility]     Specific gravity 1.022 1.003 - 1.035 [facility]     Nitrite Negative Negative^Negative [facility]     Ph urine 6.5 5.0 - 8.5 [facility]     Leukocyte esterase Negative Negative^Negative [facility]     Protein Trace (A) Negative^Negative mg/dL [facility]     Glucose, Ur 500 (A) Negative^Negative mg/dL [facility]     Ketones urine Negative Negative^Negative mg/dL [facility]     Urobilinogen <1.1 <1.1 eu/dL [facility]     Bilirubin, urine Negative Negative^Negative [facility]     Hemoglobin Negative Negative^Negative [facility]     Mucus, UA PRESENT (A) NONE^NONE [facility]     RBC <1 0 - 5 /hpf [facility]     Hyaline Casts, UA 4 (H) 0 - 2 /lpf [facility]     WBC 2 0 - 5 /hpf [facility]       Microalbumin / creatinine urine ratio (07/27/2020 9:54 AM EDT)  Microalbumin / creatinine urine ratio (07/27/2020 9:54 AM EDT)   Component Value Ref Range Performed At [facility]   Microalbumin urine 3.1 (H) 0.0 - 1.9 mg/dL [facility]     Urine creat 125.07 mg/dL [facility]     Alb/creat ratio 24.8 0.0 - 30.0 mg/g creat [facility]       CBC auto differential (07/27/2020 9:54 AM EDT)  CBC auto differential (07/27/2020 9:54 AM EDT)   Component Value Ref Range Performed At [facility]   White Blood Cells 9.2Comment: NEW REFERENCE RANGE 4.0 - 11.0 [place] [address] LAB     RBC count 4. 52Comment: NEW REFERENCE RANGE 3.80 - 5.20 [place] [address] LAB     Hemoglobin 13.3Comment: NEW REFERENCE RANGE 11.7 - 15.5 g/dL [facility] LAB     Hematocrit 39.3Comment: NEW REFERENCE RANGE 35 - 47 % [facility]     MCV 87Comment: NEW REFERENCE RANGE 80 - 100 fL [facility]     MCH 29.5Comment: NEW REFERENCE RANGE 27 - 34 pg [facility]     MCHC 33.9Comment: NEW REFERENCE RANGE 32 - 36 g/dL [facility]     RDW 13.7Comment: NEW REFERENCE RANGE 11.5 - 15.0 % [facility]     Platelets [phone] [facility]     MPV 8.1 7 - 12 fL [facility]     % neutrophils 46.0 % [facility]     % lymphocytes 31.8 % [facility]     % monocytes 8.7 % [facility]     % eosinophils 12.5 % [facility]     % Basophils 1.0 % [facility]   Neutrophils Absolute (A) 4.2 1.5 - 6.6 [address] LAB     Lymphocytes Absolute 2.9 1.0 - 3.5 [address] [address] LAB     Monocytes Absolute 0.8        POCT A1C 6/16/20,  9.2%    POCT  A1C  11/19/19,  9.0%    POCT  A1C   8/12/19, 8.8%    POCT A1C 5/13/19,  7.8 %    POCT  A1C 5/13/19    POCT A1C  8.5% , 2/13/19    POCT A1C 1/16/19,  10.2%     CBC auto differential (01/14/2019 9:41 AM EST)  CBC auto differential (01/14/2019 9:41 AM EST)   Component Value Ref Range Performed At   [facility] Blood Cells 6.4 4.0 - 11.8 [address]   RBC count 4.59 3.80 - 5.20 [address] [address] LABORATORY   Hemoglobin 13.5 11.7 - 16.0 g/dL [facility] LABORATORY   Hematocrit 39.7 35 - 47 % [facility] LABORATORY   MCV 87 81 - 100 fL [facility] LABORATORY   MCH 29.5 26 - 33.5 pg [address]   MCHC 34.1 32 - 36 g/dL [address]   RDW 13.8 11.5 - 14.7 % [facility] LABORATORY   Platelets 035 155 - [address]   MPV 8.4 7 - 12 fL [facility] LABORATORY   % neutrophils 49.6 % [facility] LABORATORY   % lymphocytes 36.2 % [facility] LABORATORY   % monocytes 8.1 % [facility] LABORATORY   % eosinophils 5.0 % [facility] LABORATORY   % basophils 1.1 % [facility] LABORATORY   Neutrophils Absolute 3.2 1.5 - 6.6 [address] [address] LABORATORY   Lymphocytes Absolute 2.3           Comprehensive metabolic panel (38/69/5939 9:41 AM EST)  Comprehensive metabolic panel (60/51/7847 9:41 AM EST)   Component Value Ref Range Performed At   Sodium 138 134 - 146 mmol/L [facility] LABORATORY   Potassium, Bld 4.5 3.5 - 5.0 mmol/L [facility] LABORATORY   Chloride 103 98 - 109 mmol/L [facility] LABORATORY   CO2 28 22 - 32 mmol/L [address]   Anion gap 7  Comment:   ANION GAP CALCULATION DOES NOT  INCLUDE K, NORMAL RANGES  REFLECT THIS CHANGE   4 - 12 mmol/L [address]   BUN 11 5 - 27 mg/dL [facility] LABORATORY   Creatinine 0.69Comment: METHOD TRACEABLE TO IDMS STANDARD 0.40 - 1.00 mg/dL [facility] LABORATORY   Glucose 252 (H) 65 - 99 mg/dL [facility] LAB[facility]ORY   Calcium 9.5 8.5 - 10.5 mg/dL [facility]ORY   Total Protein 7.1 6.0 - 8.0 g/dL [facility]AT[facility]   Albumin 4.2 3.2 - 5.3 g/dL [facility] LAB[facility]ATORY   Alkaline Phosphatase 80 39 - 130 U/L [facility] LABORATORY   AST 18 0 - 41 U/L [facility]   ALT 19 0 - 31 U/L [facility]   Total bilirubin 0.5 0.3 - 1.2 mg/dL [facility] LABORATORY   GFR MDRD Non Af Amer >60 >59 ml/min/1.73sq.m [facility] LABORATORY   GFR MDRD Af Amer >60         Urinalysis (01/14/2019 9:41 AM EST)  Urinalysis (01/14/2019 9:41 AM EST)   Component Value Ref Range Performed At   Color [address]   Specific gravity 1.031 1.003 - 1.035 [address]   Nitrite Negative Negative [address]   Ph urine 5.5 5.0 - 8.5 [facility] LABORATORY   Leukocyte esterase NegativeComment: HIGH CONCENTRATIONS OF GLUCOSE MAY DECREASE THE REACTIVITY OF THE DIPSTICK LEUKOCYTE TEST PAD.  Negative [address]   Protein Negative Negative mg/dL [facility]   Glucose, Ur >1000 (A) Negative mg/dL [address]   Ketones urine Negative Negative mg/dL [facility] LABORATORY   Urobilinogen 0.2 <1.1 eu/dL [facility] LABORATORY   Bilirubin, urine Negative Negative [facility] LABORATORY   Hemoglobin Negative         TSH (01/14/2019 9:41 AM EST)  TSH (01/14/2019 9:41 AM EST)   Component Value Ref Range Performed At   TSH 2.93 0.49 - 4.67 uIU/mL [address] [place] / creatinine urine ratio (01/14/2019 9:41 AM EST)  Microalbumin / creatinine urine ratio (01/14/2019 9:41 AM EST)   Component Value Ref Range Performed At   Microalbumin urine 1.4 0.0 - 1.9 mg/dL [address]   Urine creat 143.38 mg/dL [address]   Alb/creat ratio 9.8 0.0 - 30.0 mg/g creat [facility] LABORATORY       Lipid profile (01/14/2019 9:41 AM EST)  Lipid profile (01/14/2019 9:41 AM EST)   Component Value Ref Range Performed At   Cholesterol 135 (L) 150 - 200 mg/dL [address]   Triglycerides 62 27 - 150 mg/dL [facility] LABORATORY   HDL 50  Comment:         HDL <40 mg/dL - High Risk  HDL > or = 40mg/dL- Desirable  HDL >60 mg/dL - Negative Risk  ---------------------------------------------   >39 mg/dL [facility] LABORATORY   Very low lipoprotein 12 0 - 30 mg/dL [facility] LABORATORY   LDL (calc) 73  Comment:         LDL    <100 mg/dL - Desirable  LDL    >160 mg/dL - High Risk  ---------------------------------------------   <130 mg/dL [facility] LABORATORY                              POCT A1C 10/15/18,  9.1%    PREVIOUS LAB    POCT A1C 9.1 ON 7/6/18     LAB  FROM 12/5/17     TSH 2.82  BUN 19, CREAT 0.70, LYTES 139/3.8, CA 9.7, GLU  188, ALB 4.1, ALT 13  CHOL 150, TRIG 66, HDL 48, LDL 89  MICROALB 24 MG/G  UA 23 WBC  CBC  HGB 13.2, WBC 83, PLAT 177  A1C 8.8          IMPRESSION :     TYPE I DIABETES ON INSULIN PUMP . UNCONTROLLED. POCT A1C 6/16/20,  9.2%  HYPOTHYROIDISM  S/P THYROID NODULE RESECTION BENIGN.  ON LEVOTHYROXINE   WATER RETENTION  HYPERTENSION  HYPERCHOLESTEROLEMIA  DEPRESSION  HX OF GASTROPARESIS  AND ACID REFLUX ON PRILOSEC  CARAFATE, AND REGLAN. S  HX OF RETINOPATHY AND NEUROPATHY  OBESITY          1. Type 1 diabetes mellitus with hyperglycemia (HCC)    2. Insulin pump in place    3. Postoperative hypothyroidism    4. Water retention    5. Essential hypertension    6. Hypercholesterolemia    7. Diabetic gastroparesis ([facility]    8. Depression, unspecified depression type              Plan:     1. LAB FROM  7/27/20  REVIEWED AND  DISCUSSED. CMP LIPIDS  RESULTS PENDING. CALL LAB FOR  THE  RESULTS. 2. CHANGE  BASAL SETTING 12 MID NIGHT  INCREASE TO  1.8  U/HR,  CONTINUE  8 A.M BASAL RATE  2.0  U/HR,   10 P. M  BASAL RATE    1.8  U/HR. SENSITIVITY 40, [facility]ARGET  100-120 .  3. CONTINUE CARB RATIO  7 GM AT BREAKFAST LUNCH AND SUPPER. PATIENT IS ON NOVOLOG  VIALS  4. CONTINUE ALL OTHER MEDS AS REVIEWED AND DISCUSSED. 5. WATCH DIET AND EXERCISE AS TOLERATED. 6. KEEP ON LASIX 20 MG TWICE A WEEK   7. CONTINUE  FREESYLE SWETHA CONTINUOUS GLUCOSE MONITORING. COVERED. 8. PATIENT TO FOLLOW WITH PCP AND ENDOCRINOLOGIST OF CHOICE SINCE I AM RETIRING 7/30/20      No orders of the defined types were placed in this encounter. No orders of the defined types were placed in this encounter. No follow-ups on file. Visit date not found         Patientgiven educational materials - see patient instructions. Discussed use, benefit,and side effects of prescribed medications. All patient questions answered. Ptvoiced understanding. Reviewed health maintenance. Instructed to continue currentmedications, diet and exercise. Patient agreed with treatment plan. Follow up asdirected.      Electronically signed by Yash Pearson MD

## 2020-07-24 ENCOUNTER — TELEPHONE (OUTPATIENT)
Dept: ENDOCRINOLOGY | Age: 65
End: 2020-07-24

## 2020-07-27 ENCOUNTER — OFFICE VISIT (OUTPATIENT)
Dept: ENDOCRINOLOGY | Age: 65
End: 2020-07-27
Payer: COMMERCIAL

## 2020-07-27 VITALS
WEIGHT: 188.6 LBS | DIASTOLIC BLOOD PRESSURE: 60 MMHG | HEIGHT: 62 IN | HEART RATE: 74 BPM | BODY MASS INDEX: 34.71 KG/M2 | SYSTOLIC BLOOD PRESSURE: 118 MMHG

## 2020-07-27 PROCEDURE — 99214 OFFICE O/P EST MOD 30 MIN: CPT | Performed by: INTERNAL MEDICINE

## 2020-09-01 ENCOUNTER — OFFICE VISIT (OUTPATIENT)
Dept: FAMILY MEDICINE CLINIC | Age: 65
End: 2020-09-01
Payer: COMMERCIAL

## 2020-09-01 VITALS
HEIGHT: 64 IN | DIASTOLIC BLOOD PRESSURE: 64 MMHG | SYSTOLIC BLOOD PRESSURE: 112 MMHG | WEIGHT: 191.2 LBS | HEART RATE: 64 BPM | RESPIRATION RATE: 16 BRPM | TEMPERATURE: 97.7 F | BODY MASS INDEX: 32.64 KG/M2

## 2020-09-01 LAB
CREATININE URINE: 105.3 MG/DL
MICROALBUMIN/CREAT 24H UR: 1.2 MG/G{CREAT}

## 2020-09-01 PROCEDURE — 99204 OFFICE O/P NEW MOD 45 MIN: CPT | Performed by: NURSE PRACTITIONER

## 2020-09-01 RX ORDER — FUROSEMIDE 20 MG/1
TABLET ORAL
Qty: 30 TABLET | Refills: 2 | Status: SHIPPED | OUTPATIENT
Start: 2020-09-01 | End: 2021-06-24

## 2020-09-01 SDOH — ECONOMIC STABILITY: INCOME INSECURITY: HOW HARD IS IT FOR YOU TO PAY FOR THE VERY BASICS LIKE FOOD, HOUSING, MEDICAL CARE, AND HEATING?: NOT HARD AT ALL

## 2020-09-01 SDOH — ECONOMIC STABILITY: FOOD INSECURITY: WITHIN THE PAST 12 MONTHS, YOU WORRIED THAT YOUR FOOD WOULD RUN OUT BEFORE YOU GOT MONEY TO BUY MORE.: NEVER TRUE

## 2020-09-01 SDOH — ECONOMIC STABILITY: FOOD INSECURITY: WITHIN THE PAST 12 MONTHS, THE FOOD YOU BOUGHT JUST DIDN'T LAST AND YOU DIDN'T HAVE MONEY TO GET MORE.: NEVER TRUE

## 2020-09-01 ASSESSMENT — ENCOUNTER SYMPTOMS
CHEST TIGHTNESS: 0
BACK PAIN: 1
ABDOMINAL PAIN: 0
BLOOD IN STOOL: 0
SHORTNESS OF BREATH: 0
NAUSEA: 0

## 2020-09-01 ASSESSMENT — PATIENT HEALTH QUESTIONNAIRE - PHQ9
SUM OF ALL RESPONSES TO PHQ QUESTIONS 1-9: 1
2. FEELING DOWN, DEPRESSED OR HOPELESS: 1
SUM OF ALL RESPONSES TO PHQ9 QUESTIONS 1 & 2: 1
1. LITTLE INTEREST OR PLEASURE IN DOING THINGS: 0
SUM OF ALL RESPONSES TO PHQ QUESTIONS 1-9: 1

## 2020-09-01 NOTE — PROGRESS NOTES
Subjective:      Patient ID: Yocasta Braga is a 59 y.o. female. Chronic Disease Visit Information    BP Readings from Last 3 Encounters:   09/01/20 112/64   07/27/20 118/60   06/16/20 124/64          Hemoglobin A1C (%)   Date Value   06/16/2020 9.2   11/19/2019 9.0   08/12/2019 8.8     Microalb/Crt. Ratio (mcg/mg creat)   Date Value   09/13/2016 7     LDL Cholesterol (mg/dL)   Date Value   09/13/2016 72     LDL Calculated (mg/dL)   Date Value   12/05/2017 89     HDL (mg/dL)   Date Value   12/05/2017 48     BUN (mg/dL)   Date Value   11/14/2016 17     CREATININE (mg/dL)   Date Value   11/14/2016 0.63     Glucose (mg/dL)   Date Value   11/14/2016 171 (H)   10/13/2011 310 (H)            Have you changed or started any medications since your last visit including any over-the-counter medicines, vitamins, or herbal medicines? no   Are you having any side effects from any of your medications? -  no  Have you stopped taking any of your medications? Is so, why? -  no    Have you seen any other physician or provider since your last visit? No  Have you had any other diagnostic tests since your last visit? No  Have you been seen in the emergency room and/or had an admission to a hospital since we last saw you? No  Have you had your annual diabetic retinal (eye) exam? Yes - Records Requested - Dr. Beena Chirinos   Have you had your routine dental cleaning in the past 6 months? no    Have you activated your [employer] account? If not, what are your barriers?  Yes     Patient Care Team:  Alex Almeida MD as PCP - General (Family Medicine)  Taqueria Castro MD as Orthopedic Surgeon (Orthopedic Surgery)  Matilda Gonsalez MD as Consulting Physician (Gastroenterology)  Jerrod Hoyos MD as Consulting Physician (Internal Medicine)  Max Arguello MD as Consulting Physician (Cardiology)  Katherine Talavera MD as Surgeon (Ophthalmology)         Medical History Review  Past Medical, Family, and Social History weakness and numbness. Psychiatric/Behavioral: Negative for agitation and behavioral problems. Objective:   Physical Exam  Vitals signs and nursing note reviewed. Constitutional:       General: She is not in acute distress. Appearance: Normal appearance. She is obese. HENT:      Nose: Nose normal. No congestion. Eyes:      General: No scleral icterus. Conjunctiva/sclera: Conjunctivae normal.   Neck:      Musculoskeletal: Normal range of motion and neck supple. Cardiovascular:      Rate and Rhythm: Normal rate and regular rhythm. Pulses: Normal pulses. Heart sounds: Normal heart sounds. Pulmonary:      Effort: Pulmonary effort is normal. No respiratory distress. Breath sounds: Normal breath sounds. Abdominal:      Palpations: Abdomen is soft. Tenderness: There is no abdominal tenderness. Musculoskeletal: Normal range of motion. General: No tenderness. Skin:     General: Skin is warm and dry. Neurological:      Mental Status: She is alert and oriented to person, place, and time. Cranial Nerves: No cranial nerve deficit. Comments: Foot exam: no sores ulcers in bilateral feet. Monofilament test normal bilaterally. Psychiatric:         Mood and Affect: Mood normal.         Behavior: Behavior normal.         Assessment:      1. Type 1 diabetes mellitus without complication ([address]    2. Essential hypertension    3. Mixed hyperlipidemia    4. Acquired hypothyroidism    5. Postmenopause    6.  Encounter for screening mammogram for breast cancer            Plan:      BP Readings from Last 3 Encounters:   09/01/20 112/64   07/27/20 118/60   06/16/20 124/64     /64 (Site: Left Upper Arm, Position: Sitting, Cuff Size: Large Adult)   Pulse 64   Temp 97.7 °F (36.5 °C) (Infrared)   Resp 16   Ht 5' 3.75\" (1.619 m)   Wt 191 lb 3.2 oz (86.7 kg)   BMI 33.08 kg/m²   Lab Results   Component Value Date    WBC 6.0 11/14/2016    HGB 13.2 11/14/2016    HCT 38.7 11/14/2016     11/14/2016    CHOL 150 12/05/2017    TRIG 66 12/05/2017    HDL 48 12/05/2017    ALT 21 09/13/2016    AST 19 09/13/2016     11/14/2016    K 3.9 11/14/2016     11/14/2016    CREATININE 0.63 11/14/2016    BUN 17 11/14/2016    CO2 27 11/14/2016    TSH 2.84 04/13/2016    LABA1C 9.2 06/16/2020    LABMICR 7 09/13/2016     Lab Results   Component Value Date    CALCIUM 9.5 11/14/2016     Lab Results   Component Value Date    LDLCALC 89 12/05/2017    LDLCHOLESTEROL 72 09/13/2016         1. Type 1 diabetes mellitus without complication (HCC)  - uncontrolled. Cont insulin pump  - follow up with Dr. Jose Ross as scheduled   - Microalbumin, Ur; Future  -  DIABETES FOOT EXAM  - extensive discussion with pt counseling on good control of DM to prevent complications. Pt verbalizes understanding and is agreeable to make diet modifications. 2. Essential hypertension  - stable. No therapy change  - lasix refilled( pt takes twice weekly prn for fluid retention )    3. Mixed hyperlipidemia  - stable. Cont statin therapy     4. Acquired hypothyroidism  - stable. No dose adjustment     5. Postmenopause  - DEXA BONE DENSITY AXIAL SKELETON; Future    6. Encounter for screening mammogram for breast cancer  - [facility] DIGITAL SCREEN W OR WO CAD BILATERAL; Future        Requested Prescriptions     Signed Prescriptions Disp Refills    furosemide (LASIX) 20 MG tablet 30 tablet 2     Sig: take 1 tablet by mouth two times a week if needed for water retention       Medications Discontinued During This Encounter   Medication Reason    furosemide (LASIX) 20 MG tablet REORDER     Discussed use, benefit, and side effects of prescribed medications. Barriers to medication compliance addressed. All patient questions answered. Pt voiced understanding. Return in about 3 months (around 12/1/2020) for JAYA Moreno .

## 2020-10-23 LAB
ALBUMIN SERPL-MCNC: 4.4 G/DL
ALP BLD-CCNC: 91 U/L
ALT SERPL-CCNC: 21 U/L
ANION GAP SERPL CALCULATED.3IONS-SCNC: 9 MMOL/L
AST SERPL-CCNC: 25 U/L
BILIRUB SERPL-MCNC: 0.4 MG/DL (ref 0.1–1.4)
BUN BLDV-MCNC: 18 MG/DL
C-PEPTIDE: 0.36
CALCIUM SERPL-MCNC: 10.2 MG/DL
CHLORIDE BLD-SCNC: 99 MMOL/L
CHOLESTEROL, TOTAL: 136 MG/DL
CHOLESTEROL/HDL RATIO: 2.6
CO2: 28 MMOL/L
CREAT SERPL-MCNC: 0.6 MG/DL
CREATININE, URINE: 168.9
GFR CALCULATED: 125.3
GLUCOSE BLD-MCNC: 175 MG/DL
HDLC SERPL-MCNC: 52 MG/DL (ref 35–70)
LDL CHOLESTEROL CALCULATED: 65 MG/DL (ref 0–160)
MICROALBUMIN/CREAT 24H UR: 45.7 MG/G{CREAT}
MICROALBUMIN/CREAT UR-RTO: 27.1
NONHDLC SERPL-MCNC: NORMAL MG/DL
POTASSIUM SERPL-SCNC: 3.8 MMOL/L
SODIUM BLD-SCNC: 136 MMOL/L
T3 FREE: 2.73
T4 FREE: 1.23
TOTAL CK: 84 U/L
TOTAL PROTEIN: 7.4
TRIGL SERPL-MCNC: 95 MG/DL
TSH SERPL DL<=0.05 MIU/L-ACNC: 3.91 UIU/ML
VITAMIN B-12: 649
VLDLC SERPL CALC-MCNC: 19 MG/DL

## 2020-10-30 ENCOUNTER — TELEPHONE (OUTPATIENT)
Dept: FAMILY MEDICINE CLINIC | Age: 65
End: 2020-10-30

## 2020-11-09 LAB
AVERAGE GLUCOSE: 131
AVERAGE GLUCOSE: 131
HBA1C MFR BLD: 8.7 %
HBA1C MFR BLD: 8.7 %

## 2020-11-16 NOTE — TELEPHONE ENCOUNTER
GOT A PHONE CALL FROM [facility] WHO STATED NURSE PRACTIONERS ARE UNABLE TO SIGN PRESCRIPTION IN [place].   PLEASE REFILL CALCIUM WITH VITAMIN D.

## 2020-12-01 ENCOUNTER — OFFICE VISIT (OUTPATIENT)
Dept: FAMILY MEDICINE CLINIC | Age: 65
End: 2020-12-01
Payer: COMMERCIAL

## 2020-12-01 VITALS
TEMPERATURE: 97.3 F | DIASTOLIC BLOOD PRESSURE: 80 MMHG | HEIGHT: 62 IN | WEIGHT: 194.6 LBS | SYSTOLIC BLOOD PRESSURE: 120 MMHG | RESPIRATION RATE: 16 BRPM | BODY MASS INDEX: 35.81 KG/M2 | HEART RATE: 62 BPM

## 2020-12-01 PROBLEM — E10.8 MULTIPLE COMPLICATIONS OF TYPE 1 DIABETES MELLITUS (HCC): Status: ACTIVE | Noted: 2020-12-01

## 2020-12-01 PROBLEM — E78.2 MIXED HYPERLIPIDEMIA: Status: ACTIVE | Noted: 2020-12-01

## 2020-12-01 PROBLEM — I10 ESSENTIAL HYPERTENSION: Status: ACTIVE | Noted: 2020-12-01

## 2020-12-01 PROCEDURE — G0438 PPPS, INITIAL VISIT: HCPCS | Performed by: NURSE PRACTITIONER

## 2020-12-01 ASSESSMENT — PATIENT HEALTH QUESTIONNAIRE - PHQ9
10. IF YOU CHECKED OFF ANY PROBLEMS, HOW DIFFICULT HAVE THESE PROBLEMS MADE IT FOR YOU TO DO YOUR WORK, TAKE CARE OF THINGS AT HOME, OR GET ALONG WITH OTHER PEOPLE: 0
9. THOUGHTS THAT YOU WOULD BE BETTER OFF DEAD, OR OF HURTING YOURSELF: 0
4. FEELING TIRED OR HAVING LITTLE ENERGY: 1
SUM OF ALL RESPONSES TO PHQ QUESTIONS 1-9: 9
3. TROUBLE FALLING OR STAYING ASLEEP: 0
5. POOR APPETITE OR OVEREATING: 1
6. FEELING BAD ABOUT YOURSELF - OR THAT YOU ARE A FAILURE OR HAVE LET YOURSELF OR YOUR FAMILY DOWN: 1
2. FEELING DOWN, DEPRESSED OR HOPELESS: 3
7. TROUBLE CONCENTRATING ON THINGS, SUCH AS READING THE NEWSPAPER OR WATCHING TELEVISION: 0
8. MOVING OR SPEAKING SO SLOWLY THAT OTHER PEOPLE COULD HAVE NOTICED. OR THE OPPOSITE, BEING SO FIGETY OR RESTLESS THAT YOU HAVE BEEN MOVING AROUND A LOT MORE THAN USUAL: 0
SUM OF ALL RESPONSES TO PHQ QUESTIONS 1-9: 9
SUM OF ALL RESPONSES TO PHQ QUESTIONS 1-9: 9
SUM OF ALL RESPONSES TO PHQ9 QUESTIONS 1 & 2: 6
1. LITTLE INTEREST OR PLEASURE IN DOING THINGS: 3

## 2020-12-01 ASSESSMENT — LIFESTYLE VARIABLES: HOW OFTEN DO YOU HAVE A DRINK CONTAINING ALCOHOL: 0

## 2020-12-01 NOTE — PROGRESS NOTES
Medicare Annual Wellness Visit  Name: Eleno Fleming Date: 2020   MRN: [id number] Sex: Female   Age: 72 y.o. Ethnicity: Non-/Non    : 1955 Race: Veronica Hartman is here for Medicare AWV and Health Maintenance (Diabetic Eye Exam, PAP, Shingles, Tdap)    Screenings for behavioral, psychosocial and functional/safety risks, and cognitive dysfunction are all negative except as indicated below. These results, as well as other patient data from the [address] [facility] [address] form, are documented in Flowsheets linked to this Encounter. Allergies   Allergen Reactions    Actos [Pioglitazone] Other (See Comments)     Weight gain    Lisinopril Other (See Comments)     cough    Metformin And Related Diarrhea    Zithromax [Azithromycin] Itching       Prior to Visit Medications    Medication Sig Taking? Authorizing Provider   Calcium Carb-Cholecalciferol (CALCIUM/VITAMIN D) 600-400 MG-UNIT TABS Take one tab po bid Yes Ting Zurita MD   furosemide (LASIX) 20 MG tablet take 1 tablet by mouth two times a week if needed for water retention Yes LUCIE Velásquez CNP   sertraline (ZOLOFT) 100 MG tablet take 1 tablet by mouth once daily Yes Mickey Regalado MD   losartan-hydroCHLOROthiazide (HYZAAR) 50-12.5 MG per tablet take 1 tablet by mouth once daily Yes LUCIE Velásquez CNP   Continuous Blood Gluc Sensor (FREESTYLE SWETHA 14 DAY SENSOR) [facility] APPLY 1 SENSOR TO THE BACK OF ARM. REMOVE AND REPLACE EVERY 14 DAYS.  USE WITH DEVICE TO MONITOR BLOOD SUGAR Yes Mickey Regalado MD   atorvastatin (LIPITOR) 40 MG tablet take 1 tablet by mouth once daily Yes LUCIE Velásquez CNP   metoclopramide (REGLAN) 5 MG tablet take 1 tablet by mouth twice a day Yes Mickey Regalado MD   insulin aspart (NOVOLOG) 100 UNIT/ML injection vial INJECT 60 UNITS VIA PUMP ONCE DAILY Yes Mickey Regalado MD   levothyroxine (SYNTHROID) 88 MCG tablet take 1 tablet by mouth once daily Yes Reyes Foil, MD   isosorbide mononitrate (IMDUR) 60 MG extended release tablet take 1 tablet by mouth once daily Yes Megan Caro MD   metoprolol tartrate (LOPRESSOR) 25 MG tablet take 1 tablet by mouth twice a day Yes Megan Caro MD   HYDROcodone-acetaminophen (Bonnie Curl) 7.5-325 MG per tablet take 1 tablet by mouth every 6 hours if needed Yes Historical Provider, MD   Continuous Blood Gluc  (FREESTYLE SWETHA 14 DAY READER) SYBIL USE AS DIRECTED. GIVE SENSORS ALSO Yes Reyes Foil, MD   sucralfate (CARAFATE) 1 GM tablet Take 1 tablet by mouth 4 times daily Yes Reyes Foil, MD   aspirin 81 MG tablet Take by mouth daily Yes Historical Provider, MD   FOLIC ACID PO Take by mouth Yes Historical Provider, MD   cyclobenzaprine (FLEXERIL) 5 MG tablet Take 5 mg by mouth 3 times daily as needed for Muscle spasms Yes Historical Provider, MD   gabapentin (NEURONTIN) 400 MG capsule Take 400 mg by mouth 3 times daily Yes Historical Provider, MD   Omega-3 Fatty Acids (FISH OIL) 1000 MG CPDR Take  by mouth. Yes Historical Provider, MD       Past Medical History:   Diagnosis Date    CAD (coronary artery disease)     \"40 % blockage lower heart\"    Cataracts, bilateral     Chest pain     pressure    Chronic back pain     Depression     Diabetes ([address]     Diabetic polyneuropathy ([address]     EKG abnormalities 11/14/2016    RT. BBB    GERD (gastroesophageal reflux disease)     Headache     Hyperlipidemia     Hypertension     Hypothyroidism     Insulin pump in place     Neuropathy     Osteoarthritis     Peripheral vascular disease (HCC)     Pure hypercholesterolemia     Restless legs syndrome     Sleep apnea     Ulcer of lower extremity ([address]     Wears glasses        Past Surgical History:   Procedure Laterality Date    BACK SURGERY      cage in place at L5-S1., SPINAL CORD STIMULATOR    CARPAL TUNNEL RELEASE Bilateral     CATARACT REMOVAL WITH IMPLANT Right 05/16/2017  CATARACT REMOVAL WITH IMPLANT Left 06/13/2017    DR SHARON HERRERA    CHOLECYSTECTOMY      COLONOSCOPY  07/13/2004    normal    COLONOSCOPY  09/14/2016    no lesions seen, spasms sigmoid colon    CYST REMOVAL Right     GANGLION CYST REMOVED.  ENDOSCOPY, COLON, DIAGNOSTIC      EGD    JOINT REPLACEMENT Left     knee    OTHER SURGICAL HISTORY  11/21/2016    Revision of spinal cord stimulator    THYROID LOBECTOMY      UPPER GASTROINTESTINAL ENDOSCOPY  08/16/2016    NO LESIONS SEEN       Family History   Problem Relation Age of Onset    Heart Disease Mother     Dementia Mother     Stroke Mother     Heart Disease Father     COPD Father     Heart Disease Brother     Arthritis Brother     Other Brother         AAA    Other Brother         AAA       CareTeam (Including outside providers/suppliers regularly involved in providing care):   Patient Care Team:  Morgan Marrero MD as PCP - General (Family Medicine)  Morgan Marrero MD as PCP - [facility]  Urbano Garcia MD as Orthopedic Surgeon (Orthopedic Surgery)  Sang Rouse MD as Consulting Physician (Gastroenterology)  Aleida Berger MD as Consulting Physician (Internal Medicine)  Catina Sorensen MD as Consulting Physician (Cardiology)  Wenceslao Yeager MD as Surgeon (Ophthalmology)    Wt Readings from Last 3 Encounters:   12/01/20 194 lb 9.6 oz (88.3 kg)   09/01/20 191 lb 3.2 oz (86.7 kg)   07/27/20 188 lb 9.6 oz (85.5 kg)     Vitals:    12/01/20 1023   BP: 120/80   Site: Right Upper Arm   Position: Sitting   Cuff Size: Large Adult   Pulse: 62   Resp: 16   Temp: 97.3 °F (36.3 °C)   TempSrc: Infrared   Weight: 194 lb 9.6 oz (88.3 kg)   Height: 5' 2.25\" (1.581 m)     Body mass index is 35.31 kg/m². Based upon direct observation of the patient, evaluation of cognition reveals recent and remote memory intact. Patient's complete Health Risk Assessment and screening values have been reviewed and are found in Flowsheets. The following problems were reviewed today and where indicated follow up appointments were made and/or referrals ordered. Positive Risk Factor Screenings with Interventions:     Depression:  PHQ-2 Score: 6  PHQ-9 Total Score: 9    Severity:1-4 = minimal depression, 5-9 = mild depression, 10-14 = moderate depression, 15-19 = moderately severe depression, 20-27 = severe depression  Depression Interventions:  · pt is on zoloft and states she is doing well with it. General Health and ACP:  General  In general, how would you say your health is?: Very Good  In the past 7 days, have you experienced any of the following? New or Increased Pain, New or Increased Fatigue, Loneliness, Social Isolation, Stress or Anger?: (!) New or Increased Pain, New or Increased Fatigue, Stress  Do you get the social and emotional support that you need?: Yes  Do you have a Living Will?: (!) No  Advance Directives     Power of  Living Will ACP-Advance Directive ACP-Power of     Not on File Not on File [place]      General Health Risk Interventions:  · Pain issues: chronic back pain and currently follows up with pain managemtn  · Fatigue: fatigue is related to high sugar   · Stress: taking care of grandchildren gives a lot of stress.    · Materials printed out     Health Habits/Nutrition:  Health Habits/Nutrition  Do you exercise for at least 20 minutes 2-3 times per week?: Yes  Have you lost any weight without trying in the past 3 months?: No  Do you eat fewer than 2 meals per day?: (!) Yes(sometimes)  Have you seen a dentist within the past year?: (!) No  Body mass index: (!) 35.3  Health Habits/Nutrition Interventions:  · Inadequate physical activity:  patient agrees to wear a pedometer and walk at least 10,000 steps/day  · Dental exam overdue:  patient encouraged to make appointment with his/her dentist    Hearing/Vision:  No exam data present  Hearing/Vision  Do you or your family notice any trouble with your hearing?: (!) Yes  Do you have difficulty driving, watching TV, or doing any of your daily activities because of your eyesight?: No  Have you had an eye exam within the past year?: Yes  Hearing/Vision Interventions:  · Hearing concerns:  patient declines any further evaluation/treatment for hearing issues, ENT referral provided    Safety:  Safety  Do you have working smoke detectors?: Yes  Have all throw rugs been removed or fastened?: (!) No  Do you have non-slip mats or surfaces in all bathtubs/showers?: (!) No  Do all of your stairways have a railing or banister?: Yes  Are your doorways, halls and stairs free of clutter?: Yes  Do you always fasten your seatbelt when you are in a car?: Yes  Safety Interventions:  · Home safety tips provided    Personalized Preventive Plan   Current Health Maintenance Status  Immunization History   Administered Date(s) Administered    Influenza Vaccine, unspecified formulation 10/24/2012, 02/07/2014    Influenza, High Dose (Fluzone 65 yrs and older) 10/24/2012, 01/07/2014, 11/04/2014, 10/07/2015, 09/28/2016    Influenza, Quadv, IM, (6 mo and older Fluzone, Flulaval, Fluarix and 3 yrs and older Afluria) 09/21/2017    Pneumococcal Conjugate 13-valent ([id number]) 12/04/2014    Pneumococcal Polysaccharide ([id number]) 11/10/2010, 04/08/2019        Health Maintenance   Topic Date Due    Hepatitis C screen  1955    HIV screen  09/27/1970    DTaP/Tdap/Td vaccine (1 - Tdap) 09/27/1974    Cervical cancer screen  09/27/1976    Shingles Vaccine (1 of 2) 09/27/2005    Diabetic retinal exam  03/27/2018    Annual Wellness Visit (AWV)  06/23/2019    Flu vaccine (1) 09/01/2020    Diabetic foot exam  10/23/2021    Diabetic microalbuminuria test  10/23/2021    Lipid screen  10/23/2021    Potassium monitoring  10/23/2021    Creatinine monitoring  10/23/2021    A1C test (Diabetic or Prediabetic)  11/09/2021    Breast cancer screen  11/13/2022    Pneumococcal 65+ years Vaccine (2 of 2 - PPSV23) 04/08/2024    Colon cancer screen colonoscopy  09/14/2026    DEXA (modify frequency per FRAX score)  Completed    Hepatitis A vaccine  Aged Out    Hib vaccine  Aged Out    Meningococcal (ACWY) vaccine  Aged Out     Recommendations for [employer] Due: see orders and patient instructions/AVS.  . Recommended screening schedule for the next 5-10 years is provided to the patient in written form: see Patient Carmelo Wagnerbernardo was seen today for medicare aw and health maintenance.     Diagnoses and all orders for this visit:    Type 1 diabetes mellitus without complication ([facility]

## 2020-12-12 ENCOUNTER — TELEPHONE (OUTPATIENT)
Dept: GASTROENTEROLOGY | Age: 65
End: 2020-12-12

## 2020-12-12 NOTE — TELEPHONE ENCOUNTER
Called pt regarding referral for colon/egd. Pt had colon/egd in 2016 with Dr Kelsie Blake- colon was normal in 2016. Pt needs OV prior to scheduling procedures. Pt scheduled for NP-return OV with Dr Fifi Chilel on Dec 18th at [facility].

## 2020-12-15 ENCOUNTER — OFFICE VISIT (OUTPATIENT)
Dept: GASTROENTEROLOGY | Age: 65
End: 2020-12-15
Payer: COMMERCIAL

## 2020-12-15 VITALS
OXYGEN SATURATION: 100 % | TEMPERATURE: 96.8 F | SYSTOLIC BLOOD PRESSURE: 137 MMHG | DIASTOLIC BLOOD PRESSURE: 79 MMHG | WEIGHT: 196.2 LBS | BODY MASS INDEX: 35.6 KG/M2 | HEART RATE: 67 BPM

## 2020-12-15 PROCEDURE — 99204 OFFICE O/P NEW MOD 45 MIN: CPT | Performed by: INTERNAL MEDICINE

## 2020-12-15 RX ORDER — POLYETHYLENE GLYCOL 3350 17 G/17G
POWDER, FOR SOLUTION ORAL
Qty: 238 G | Refills: 0 | Status: ON HOLD | OUTPATIENT
Start: 2020-12-15 | End: 2021-04-16

## 2020-12-15 ASSESSMENT — ENCOUNTER SYMPTOMS
SHORTNESS OF BREATH: 0
BACK PAIN: 1
ANAL BLEEDING: 0
COUGH: 0
RECTAL PAIN: 0
VOMITING: 1
NAUSEA: 0
BLOOD IN STOOL: 0
ABDOMINAL PAIN: 1
DIARRHEA: 1
WHEEZING: 0
TROUBLE SWALLOWING: 0
ABDOMINAL DISTENTION: 1
APNEA: 0
SINUS PAIN: 0
SINUS PRESSURE: 0
CONSTIPATION: 0
RESPIRATORY NEGATIVE: 1
CHOKING: 0
SORE THROAT: 0

## 2020-12-15 NOTE — PROGRESS NOTES
Patient's PMH/PSH,SH,PSYCH Hx, MEDs, ALLERGIES, and ROS were all reviewed and updated in the appropriate sections. Yes      PAST MEDICAL HISTORY:  Past Medical History:   Diagnosis Date    CAD (coronary artery disease)     \"40 % blockage lower heart\"    Cataracts, bilateral     Chest pain     pressure    Chronic back pain     Depression     Diabetes ([address]     Diabetic polyneuropathy ([address]     EKG abnormalities 11/14/2016    RT. BBB    GERD (gastroesophageal reflux disease)     Headache     Hyperlipidemia     Hypertension     Hypothyroidism     Insulin pump in place     Neuropathy     Osteoarthritis     Peripheral vascular disease (HCC)     Pure hypercholesterolemia     Restless legs syndrome     Sleep apnea     Ulcer of lower extremity ([address]     Wears glasses        Past Surgical History:   Procedure Laterality Date    BACK SURGERY      cage in place at L5-S1., SPINAL CORD STIMULATOR    CARPAL TUNNEL RELEASE Bilateral     CATARACT REMOVAL WITH IMPLANT Right 05/16/2017    CATARACT REMOVAL WITH IMPLANT Left 06/13/2017    DR SHARON HERRERA    CHOLECYSTECTOMY      COLONOSCOPY  07/13/2004    normal    COLONOSCOPY  09/14/2016    no lesions seen, spasms sigmoid colon    CYST REMOVAL Right     GANGLION CYST REMOVED.     ENDOSCOPY, COLON, DIAGNOSTIC      EGD    JOINT REPLACEMENT Left     knee    OTHER SURGICAL HISTORY  11/21/2016    Revision of spinal cord stimulator    THYROID LOBECTOMY      UPPER GASTROINTESTINAL ENDOSCOPY  08/16/2016    NO LESIONS SEEN       CURRENT MEDICATIONS:    Current Outpatient Medications:     Calcium Carb-Cholecalciferol (CALCIUM/VITAMIN D) 600-400 MG-UNIT TABS, Take one tab po bid, Disp: 60 tablet, Rfl: 5    furosemide (LASIX) 20 MG tablet, take 1 tablet by mouth two times a week if needed for water retention, Disp: 30 tablet, Rfl: 2    sertraline (ZOLOFT) 100 MG tablet, take 1 tablet by mouth once daily, Disp: 90 tablet, Rfl: 0   losartan-hydroCHLOROthiazide (HYZAAR) 50-12.5 MG per tablet, take 1 tablet by mouth once daily, Disp: 90 tablet, Rfl: 1    Continuous Blood Gluc Sensor (FREESTYLE SWETHA 14 DAY SENSOR) [facility], APPLY 1 SENSOR TO THE BACK OF ARM. REMOVE AND REPLACE EVERY 14 DAYS. USE WITH DEVICE TO MONITOR BLOOD SUGAR, Disp: 1 each, Rfl: 1    atorvastatin (LIPITOR) 40 MG tablet, take 1 tablet by mouth once daily, Disp: 90 tablet, Rfl: 1    metoclopramide (REGLAN) 5 MG tablet, take 1 tablet by mouth twice a day, Disp: 60 tablet, Rfl: 5    insulin aspart (NOVOLOG) 100 UNIT/ML injection vial, INJECT 60 UNITS VIA PUMP ONCE DAILY, Disp: 60 mL, Rfl: 3    levothyroxine (SYNTHROID) 88 MCG tablet, take 1 tablet by mouth once daily, Disp: 90 tablet, Rfl: 2    isosorbide mononitrate (IMDUR) 60 MG extended release tablet, take 1 tablet by mouth once daily, Disp: 90 tablet, Rfl: 0    metoprolol tartrate (LOPRESSOR) 25 MG tablet, take 1 tablet by mouth twice a day, Disp: 60 tablet, Rfl: 0    HYDROcodone-acetaminophen (NORCO) 7.5-325 MG per tablet, take 1 tablet by mouth every 6 hours if needed, Disp: , Rfl: 0    Continuous Blood Gluc  (FREESTYLE SWETHA 14 DAY READER) SYBIL, USE AS DIRECTED.  GIVE SENSORS ALSO, Disp: 1 Device, Rfl: 0    sucralfate (CARAFATE) 1 GM tablet, Take 1 tablet by mouth 4 times daily, Disp: 120 tablet, Rfl: 2    aspirin 81 MG tablet, Take by mouth daily, Disp: , Rfl:     FOLIC ACID PO, Take by mouth, Disp: , Rfl:     cyclobenzaprine (FLEXERIL) 5 MG tablet, Take 5 mg by mouth 3 times daily as needed for Muscle spasms, Disp: , Rfl:     gabapentin (NEURONTIN) 400 MG capsule, Take 400 mg by mouth 3 times daily, Disp: , Rfl:     Omega-3 Fatty Acids (FISH OIL) 1000 MG CPDR, Take  by mouth.  , Disp: , Rfl:     ALLERGIES:   Allergies   Allergen Reactions    Actos [Pioglitazone] Other (See Comments)     Weight gain    Lisinopril Other (See Comments)     cough    Metformin And Related Diarrhea  Zithromax [Azithromycin] Itching       FAMILY HISTORY:       Problem Relation Age of Onset    Heart Disease Mother     Dementia Mother     Stroke Mother     Heart Disease Father     COPD Father     Heart Disease Brother     Arthritis Brother     Other Brother         AAA    Other Brother         AAA         SOCIAL HISTORY:   Social History     Socioeconomic History    Marital status:      Spouse name: Not on file    Number of children: Not on file    Years of education: Not on file    Highest education level: Not on file   Occupational History    Not on file   Social Needs    Financial resource strain: Not hard at all    Food insecurity     Worry: Never true     Inability: Never true   [employer] needs     Medical: Not on file     Non-medical: Not on file   Tobacco Use    Smoking status: Never Smoker    Smokeless tobacco: Never Used   Substance and Sexual Activity    Alcohol use: No    Drug use: No    Sexual activity: Not on file   Lifestyle    Physical activity     Days per week: Not on file     Minutes per session: Not on file    Stress: Not on file   Relationships    Social connections     Talks on phone: Not on file     Gets together: Not on file     Attends Nondenominational service: Not on file     Active member of club or organization: Not on file     Attends meetings of clubs or organizations: Not on file     Relationship status: Not on file    Intimate partner violence     Fear of current or ex partner: Not on file     Emotionally abused: Not on file     Physically abused: Not on file     Forced sexual activity: Not on file   Other Topics Concern    Not on file   Social History Narrative    Not on file         REVIEW OF SYSTEMS:         Review of Systems   Constitutional: Positive for appetite change (decrease), fatigue and unexpected weight change (increase). HENT: Negative. Negative for postnasal drip, sinus pressure, sinus pain, sore throat and trouble swallowing. Eyes: Positive for visual disturbance (glasses). Respiratory: Negative. Negative for apnea, cough, choking, shortness of breath and wheezing. Cardiovascular: Negative. Negative for chest pain, palpitations and leg swelling. Gastrointestinal: Positive for abdominal distention, abdominal pain, diarrhea and vomiting. Negative for anal bleeding, blood in stool, constipation, nausea and rectal pain. Genitourinary: Negative for difficulty urinating. Musculoskeletal: Positive for arthralgias and back pain. Negative for gait problem. Neurological: Positive for light-headedness. Negative for dizziness, weakness, numbness and headaches. Hematological: Does not bruise/bleed easily. Psychiatric/Behavioral: Negative for sleep disturbance. The patient is not nervous/anxious. PHYSICAL EXAMINATION:     Vital signs reviewed per the nursing documentation. /79   Pulse 67   Temp 96.8 °F (36 °C)   Wt 196 lb 3.2 oz (89 kg)   SpO2 100%   BMI 35.60 kg/m²   Body mass index is 35.6 kg/m². Physical Exam  Vitals signs and nursing note reviewed. Constitutional:       Appearance: She is well-developed. Comments: Moderately overweight patient. HENT:      Head: Normocephalic and atraumatic. Eyes:      General: No scleral icterus. Conjunctiva/sclera: Conjunctivae normal.      Pupils: Pupils are equal, round, and reactive to light. Neck:      Musculoskeletal: Normal range of motion and neck supple. Thyroid: No thyromegaly. Vascular: No hepatojugular reflux or JVD. Trachea: No tracheal deviation. Cardiovascular:      Rate and Rhythm: Normal rate and regular rhythm. Heart sounds: Normal heart sounds. Pulmonary:      Effort: Pulmonary effort is normal. No respiratory distress. Breath sounds: Normal breath sounds. No wheezing or rales. Abdominal:      General: Bowel sounds are normal. There is no distension. Palpations: Abdomen is soft. There is no hepatomegaly or mass. Tenderness: There is no abdominal tenderness. There is no rebound. Hernia: No hernia is present. Comments: Mildly obese abdomen. Genitourinary:     Rectum: Guaiac result negative. Musculoskeletal:         General: No tenderness. Comments: No joint swelling   Lymphadenopathy:      Cervical: No cervical adenopathy. Skin:     General: Skin is warm. Findings: No bruising, ecchymosis, erythema or rash. Neurological:      Mental Status: She is alert and oriented to person, place, and time. Cranial Nerves: No cranial nerve deficit. Psychiatric:         Thought Content: Thought content normal.           LABORATORY DATA: Reviewed  Lab Results   Component Value Date    WBC 6.0 11/14/2016    HGB 13.2 11/14/2016    HCT 38.7 11/14/2016    MCV 84.5 11/14/2016     11/14/2016    .0 10/23/2020    K 3.8 10/23/2020    CL 99.0 10/23/2020    CO2 28.0 10/23/2020    BUN 18.0 10/23/2020    CREATININE 0.6 10/23/2020    LABALBU 4.4 10/23/2020    BILITOT 0.40 10/23/2020    ALKPHOS 91.0 10/23/2020    AST 25.0 10/23/2020    ALT 21.0 10/23/2020         Lab Results   Component Value Date    RBC 4.58 11/14/2016    HGB 13.2 11/14/2016    MCV 84.5 11/14/2016    MCH 28.8 11/14/2016    MCHC 34.1 11/14/2016    RDW 13.9 11/14/2016    MPV 8.5 11/14/2016    BASOPCT 1 11/14/2016    LYMPHSABS 1.90 11/14/2016    MONOSABS 0.50 11/14/2016    NEUTROABS 3.20 11/14/2016    EOSABS 0.30 11/14/2016    BASOSABS 0.10 11/14/2016         DIAGNOSTIC TESTING:     No results found. Assessment  1. Chronic diarrhea    2. Weight gain    3. Epigastric discomfort        Plan  This patient has chronic diarrhea. No weight loss. She has weight gain. 3 to evaluate possibility of celiac disease, diabetic diarrhea, microscopic colitis diabetic gastroparesis, outlet obstruction. She needs labs as ordered. Also will need EGD colonoscopy. This are arranged. Discussed with the patient regarding their symptoms various possibilities. Explained regarding the procedures, adequate colon preparation, risks and benefits. She understood and verbalized the consent. Thank you for allowing me to participate in the care of Ms. Mccall. For any further questions please do not hesitate to contact me. I have reviewed and agree with the ROS entered by the MA/LPN. Note is dictated utilizing voice recognition software. Unfortunately this leads to occasional typographical errors.  Please contact our office if you have any questions        Osiris Contreras MD,FACP, [facility]  Board Certified in Gastroenterology and [address] [place] [address] Gastroenterology  Office #: [phone]

## 2021-01-09 LAB
LIPASE: NORMAL
SEDIMENTATION RATE, ERYTHROCYTE: NORMAL
TISSUE TRANSGLUTAMINASE IGA: NORMAL
TSH SERPL DL<=0.05 MIU/L-ACNC: NORMAL M[IU]/L

## 2021-01-12 DIAGNOSIS — K52.9 CHRONIC DIARRHEA: ICD-10-CM

## 2021-01-12 DIAGNOSIS — R63.5 WEIGHT GAIN: ICD-10-CM

## 2021-01-29 DIAGNOSIS — K52.9 CHRONIC DIARRHEA: ICD-10-CM

## 2021-02-15 ENCOUNTER — OFFICE VISIT (OUTPATIENT)
Dept: GASTROENTEROLOGY | Age: 66
End: 2021-02-15
Payer: COMMERCIAL

## 2021-02-15 ENCOUNTER — TELEPHONE (OUTPATIENT)
Dept: GASTROENTEROLOGY | Age: 66
End: 2021-02-15

## 2021-02-15 VITALS
SYSTOLIC BLOOD PRESSURE: 120 MMHG | HEART RATE: 71 BPM | BODY MASS INDEX: 35.55 KG/M2 | DIASTOLIC BLOOD PRESSURE: 79 MMHG | TEMPERATURE: 97.5 F | OXYGEN SATURATION: 100 % | WEIGHT: 193.2 LBS | HEIGHT: 62 IN

## 2021-02-15 DIAGNOSIS — R74.8 ELEVATED LIPASE: Primary | ICD-10-CM

## 2021-02-15 DIAGNOSIS — R19.7 DIARRHEA, UNSPECIFIED TYPE: ICD-10-CM

## 2021-02-15 PROCEDURE — 99214 OFFICE O/P EST MOD 30 MIN: CPT | Performed by: NURSE PRACTITIONER

## 2021-02-15 RX ORDER — PANTOPRAZOLE SODIUM 40 MG/1
40 TABLET, DELAYED RELEASE ORAL
Qty: 90 TABLET | Refills: 1 | Status: SHIPPED | OUTPATIENT
Start: 2021-02-15

## 2021-02-15 ASSESSMENT — ENCOUNTER SYMPTOMS
SINUS PRESSURE: 0
DIARRHEA: 1
RECTAL PAIN: 0
COUGH: 0
ABDOMINAL PAIN: 1
ANAL BLEEDING: 0
BACK PAIN: 1
WHEEZING: 0
APNEA: 0
RESPIRATORY NEGATIVE: 1
ABDOMINAL DISTENTION: 1
TROUBLE SWALLOWING: 0
VOMITING: 1
NAUSEA: 0
SHORTNESS OF BREATH: 0
BLOOD IN STOOL: 0
SORE THROAT: 0
SINUS PAIN: 0
CONSTIPATION: 0
CHOKING: 0

## 2021-02-15 NOTE — PROGRESS NOTES
GI CLINIC FOLLOW UP    INTERVAL HISTORY:   No referring provider defined for this encounter. Chief Complaint   Patient presents with    Follow-up     Patient is here today to f/u on colon EGD and labs       HISTORY OF PRESENT ILLNESS:     Patient being seen for follow up EGD, colonoscopy, labs. Patient recently had colonoscopy to evaluate diarrhea. Noted to have spasm. No lesions seen. Random biopsies to rule out microscopic colitis were unremarkable. EGD revealed small distal polyp in the esophagus, 3-4 mm, removed with biopsy forceps. Histology revealed active esophagitis with numerous intraepithelial eosinophils. No dysplasia or malignancy identified. Over 40 eosinophils seen per high-power field. Tissue transglutaminase antibody less than 1.2, sed rate 29. Lipase elevated at 173. Presently patient reports loose stools 4-5 times a day. Denies melena, hematochezia. Has urgency of bowel movements. No nocturnal bowel movements. Denies any food allergies. Denies any symptoms of lactose intolerance. Denies constipation. Patient has history of diabetes. Last hemoglobin A1c greater than 8. Patient also reports symptoms of abdominal bloating, GERD. She reports reflux. No nausea, vomiting. There is no weight loss. Patient is gaining weight. Does endorse back pain. No family history of pancreatitis. Past Medical,Family, and Social History reviewed and does contribute to the patient presentingcondition. Patient's PMH/PSH,SH,PSYCH Hx, MEDs, ALLERGIES, and ROS were all reviewed and updated in the appropriate sections. PAST MEDICAL HISTORY:  Past Medical History:   Diagnosis Date    CAD (coronary artery disease)     \"40 % blockage lower heart\"    Cataracts, bilateral     Chest pain     pressure    Chronic back pain     Depression     Diabetes ([facility]     Diabetic polyneuropathy ([facility]     EKG abnormalities 11/14/2016    RT. BBB    GERD (gastroesophageal reflux disease)  Headache     Hyperlipidemia     Hypertension     Hypothyroidism     Insulin pump in place     Neuropathy     Osteoarthritis     Peripheral vascular disease (HCC)     Pure hypercholesterolemia     Restless legs syndrome     Sleep apnea     Ulcer of lower extremity ([address]     Wears glasses        Past Surgical History:   Procedure Laterality Date    BACK SURGERY      cage in place at L5-S1., SPINAL CORD STIMULATOR    CARPAL TUNNEL RELEASE Bilateral     CATARACT REMOVAL WITH IMPLANT Right 05/16/2017    CATARACT REMOVAL WITH IMPLANT Left 06/13/2017    DR SHARON HERRERA    CHOLECYSTECTOMY      COLONOSCOPY  07/13/2004    normal    COLONOSCOPY  09/14/2016    no lesions seen, spasms sigmoid colon    COLONOSCOPY  01/14/2021    DR KARLEY MARTINS-    CYST REMOVAL Right     GANGLION CYST REMOVED.  ENDOSCOPY, COLON, DIAGNOSTIC      EGD    JOINT REPLACEMENT Left     knee    OTHER SURGICAL HISTORY  11/21/2016    Revision of spinal cord stimulator    THYROID LOBECTOMY      UPPER GASTROINTESTINAL ENDOSCOPY  08/16/2016    NO LESIONS SEEN    UPPER GASTROINTESTINAL ENDOSCOPY  01/14/2021    ESOPHAGEAL POLYP - DR KARLEY MARTINS       CURRENT MEDICATIONS:    Current Outpatient Medications:     bisacodyl (DULCOLAX) 5 MG EC tablet, TAKE 2 TABS AT 9 AM THE DAY PRIOR TO COLONOSCOPY, Disp: 2 tablet, Rfl: 0    polyethylene glycol (GLYCOLAX) 17 GM/SCOOP powder, Use as directed by following your patient instructions given by office. , Disp: 238 g, Rfl: 0    Calcium Carb-Cholecalciferol (CALCIUM/VITAMIN D) 600-400 MG-UNIT TABS, Take one tab po bid, Disp: 60 tablet, Rfl: 5    furosemide (LASIX) 20 MG tablet, take 1 tablet by mouth two times a week if needed for water retention, Disp: 30 tablet, Rfl: 2    sertraline (ZOLOFT) 100 MG tablet, take 1 tablet by mouth once daily, Disp: 90 tablet, Rfl: 0    losartan-hydroCHLOROthiazide (HYZAAR) 50-12.5 MG per tablet, take 1 tablet by mouth once daily, Disp: 90 tablet, Rfl: 1   Continuous Blood Gluc Sensor (FREESTYLE SWETHA 14 DAY SENSOR) [facility] [place], APPLY 1 SENSOR TO THE BACK OF ARM. REMOVE AND REPLACE EVERY 14 DAYS. USE WITH DEVICE TO MONITOR BLOOD SUGAR, Disp: 1 each, Rfl: 1    atorvastatin (LIPITOR) 40 MG tablet, take 1 tablet by mouth once daily, Disp: 90 tablet, Rfl: 1    metoclopramide (REGLAN) 5 MG tablet, take 1 tablet by mouth twice a day, Disp: 60 tablet, Rfl: 5    insulin aspart (NOVOLOG) 100 UNIT/ML injection vial, INJECT 60 UNITS VIA PUMP ONCE DAILY, Disp: 60 mL, Rfl: 3    levothyroxine (SYNTHROID) 88 MCG tablet, take 1 tablet by mouth once daily, Disp: 90 tablet, Rfl: 2    isosorbide mononitrate (IMDUR) 60 MG extended release tablet, take 1 tablet by mouth once daily, Disp: 90 tablet, Rfl: 0    metoprolol tartrate (LOPRESSOR) 25 MG tablet, take 1 tablet by mouth twice a day, Disp: 60 tablet, Rfl: 0    HYDROcodone-acetaminophen (NORCO) 7.5-325 MG per tablet, take 1 tablet by mouth every 6 hours if needed, Disp: , Rfl: 0    Continuous Blood Gluc  (FREESTYLE SWETHA 14 DAY READER) SYBIL, USE AS DIRECTED.  GIVE SENSORS ALSO, Disp: 1 Device, Rfl: 0    sucralfate (CARAFATE) 1 GM tablet, Take 1 tablet by mouth 4 times daily, Disp: 120 tablet, Rfl: 2    aspirin 81 MG tablet, Take by mouth daily, Disp: , Rfl:     FOLIC ACID PO, Take by mouth, Disp: , Rfl:     cyclobenzaprine (FLEXERIL) 5 MG tablet, Take 5 mg by mouth 3 times daily as needed for Muscle spasms, Disp: , Rfl:     gabapentin (NEURONTIN) 400 MG capsule, Take 400 mg by mouth 3 times daily, Disp: , Rfl:     Omega-3 Fatty Acids (FISH OIL) 1000 MG CPDR, Take  by mouth.  , Disp: , Rfl:     ALLERGIES:   Allergies   Allergen Reactions    Actos [Pioglitazone] Other (See Comments)     Weight gain    Lisinopril Other (See Comments)     cough    Metformin And Related Diarrhea    Zithromax [Azithromycin] Itching       FAMILY HISTORY:       Problem Relation Age of Onset    Heart Disease Mother     Dementia Mother    Keira Morin Stroke Mother     Heart Disease Father     COPD Father     Heart Disease Brother     Arthritis Brother     Other Brother         AAA    Other Brother         AAA         SOCIAL HISTORY:   Social History     Socioeconomic History    Marital status:      Spouse name: Not on file    Number of children: Not on file    Years of education: Not on file    Highest education level: Not on file   Occupational History    Not on file   Social Needs    Financial resource strain: Not hard at all    Food insecurity     Worry: Never true     Inability: Never true   [employer] needs     Medical: Not on file     Non-medical: Not on file   Tobacco Use    Smoking status: Never Smoker    Smokeless tobacco: Never Used   Substance and Sexual Activity    Alcohol use: No    Drug use: No    Sexual activity: Not on file   Lifestyle    Physical activity     Days per week: Not on file     Minutes per session: Not on file    Stress: Not on file   Relationships    Social connections     Talks on phone: Not on file     Gets together: Not on file     Attends Baptism service: Not on file     Active member of club or organization: Not on file     Attends meetings of clubs or organizations: Not on file     Relationship status: Not on file    Intimate partner violence     Fear of current or ex partner: Not on file     Emotionally abused: Not on file     Physically abused: Not on file     Forced sexual activity: Not on file   Other Topics Concern    Not on file   Social History Narrative    Not on file       REVIEW OF SYSTEMS: A 12-point review of systemswas obtained and pertinent positives and negatives were enumerated above in the history of present illness. All other reviewed systems / symptoms were negative. Review of Systems   Constitutional: Positive for appetite change (decrease), fatigue and unexpected weight change (increase). HENT: Negative.   Negative for postnasal drip, sinus pressure, sinus pain, sore throat and trouble swallowing. Eyes: Positive for visual disturbance (glasses). Respiratory: Negative. Negative for apnea, cough, choking, shortness of breath and wheezing. Cardiovascular: Negative. Negative for chest pain, palpitations and leg swelling. Gastrointestinal: Positive for abdominal distention, abdominal pain, diarrhea and vomiting. Negative for anal bleeding, blood in stool, constipation, nausea and rectal pain. Genitourinary: Negative for difficulty urinating. Musculoskeletal: Positive for arthralgias and back pain. Negative for gait problem. Neurological: Positive for light-headedness. Negative for dizziness, weakness, numbness and headaches. Hematological: Does not bruise/bleed easily. Psychiatric/Behavioral: Negative for sleep disturbance. The patient is not nervous/anxious. PHYSICAL EXAMINATION: Vital signs reviewed per the nursing documentation. /79   Pulse 71   Temp 97.5 °F (36.4 °C)   Ht 5' 2\" (1.575 m)   Wt 193 lb 3.2 oz (87.6 kg)   SpO2 100%   BMI 35.34 kg/m²   Body mass index is 35.34 kg/m². Physical Exam  Constitutional:       Appearance: Normal appearance. Eyes:      General: No scleral icterus. Pupils: Pupils are equal, round, and reactive to light. Cardiovascular:      Rate and Rhythm: Normal rate and regular rhythm. Heart sounds: Normal heart sounds. Pulmonary:      Effort: Pulmonary effort is normal.      Breath sounds: Normal breath sounds. Abdominal:      General: Bowel sounds are normal. There is no distension. Palpations: Abdomen is soft. There is no mass. Tenderness: There is no abdominal tenderness. There is no guarding. Skin:     General: Skin is warm and dry. Coloration: Skin is not jaundiced. Neurological:      Mental Status: She is alert and oriented to person, place, and time. Mental status is at baseline.            LABORATORY DATA: Reviewed  Lab Results   Component Value Date    WBC 6.0 Gastroenterology  Office #: [phone]

## 2021-02-15 NOTE — TELEPHONE ENCOUNTER
shannan for Fatoumata Rodriguez to inform her Sofiya Leslie ordered ct scan and lab work at office visit on 02/15/21. Writer apologized for not seeing orders at check out. She can call [facility] scheduling at [phone] and walk in for lab work. Asked to call the office with any questions.

## 2021-04-16 ENCOUNTER — HOSPITAL ENCOUNTER (OUTPATIENT)
Age: 66
Setting detail: OUTPATIENT SURGERY
Discharge: HOME | End: 2021-04-16
Attending: PAIN MEDICINE | Admitting: PAIN MEDICINE
Payer: COMMERCIAL

## 2021-04-16 ENCOUNTER — APPOINTMENT (OUTPATIENT)
Dept: GENERAL RADIOLOGY | Age: 66
End: 2021-04-16
Attending: PAIN MEDICINE
Payer: COMMERCIAL

## 2021-04-16 VITALS
HEART RATE: 70 BPM | BODY MASS INDEX: 32.43 KG/M2 | DIASTOLIC BLOOD PRESSURE: 64 MMHG | OXYGEN SATURATION: 99 % | TEMPERATURE: 97.5 F | SYSTOLIC BLOOD PRESSURE: 136 MMHG | WEIGHT: 183 LBS | HEIGHT: 63 IN | RESPIRATION RATE: 15 BRPM

## 2021-04-16 LAB — GLUCOSE BLD-MCNC: 92 MG/DL (ref 65–105)

## 2021-04-16 PROCEDURE — 3209999900 FLUORO FOR SURGICAL PROCEDURES

## 2021-04-16 PROCEDURE — 99153 MOD SED SAME PHYS/QHP EA: CPT | Performed by: PAIN MEDICINE

## 2021-04-16 PROCEDURE — 3600000054 HC PAIN LEVEL 3 BASE: Performed by: PAIN MEDICINE

## 2021-04-16 PROCEDURE — 3600000055 HC PAIN LEVEL 3 ADDL 15 MIN: Performed by: PAIN MEDICINE

## 2021-04-16 PROCEDURE — 2500000003 HC RX 250 WO HCPCS: Performed by: PAIN MEDICINE

## 2021-04-16 PROCEDURE — C1787 PATIENT PROGR, NEUROSTIM: HCPCS | Performed by: PAIN MEDICINE

## 2021-04-16 PROCEDURE — 7100000011 HC PHASE II RECOVERY - ADDTL 15 MIN: Performed by: PAIN MEDICINE

## 2021-04-16 PROCEDURE — C1767 GENERATOR, NEURO NON-RECHARG: HCPCS | Performed by: PAIN MEDICINE

## 2021-04-16 PROCEDURE — 2709999900 HC NON-CHARGEABLE SUPPLY: Performed by: PAIN MEDICINE

## 2021-04-16 PROCEDURE — 6360000002 HC RX W HCPCS: Performed by: PAIN MEDICINE

## 2021-04-16 PROCEDURE — 99152 MOD SED SAME PHYS/QHP 5/>YRS: CPT | Performed by: PAIN MEDICINE

## 2021-04-16 PROCEDURE — 82947 ASSAY GLUCOSE BLOOD QUANT: CPT

## 2021-04-16 PROCEDURE — 7100000010 HC PHASE II RECOVERY - FIRST 15 MIN: Performed by: PAIN MEDICINE

## 2021-04-16 DEVICE — GENERATOR NEUROSTIMULATOR 304CUCM H219XL195IN THK053IN: Type: IMPLANTABLE DEVICE | Site: BACK | Status: FUNCTIONAL

## 2021-04-16 RX ORDER — CLINDAMYCIN PHOSPHATE 900 MG/50ML
INJECTION INTRAVENOUS CONTINUOUS PRN
Status: COMPLETED | OUTPATIENT
Start: 2021-04-16 | End: 2021-04-16

## 2021-04-16 RX ORDER — SODIUM CHLORIDE, SODIUM LACTATE, POTASSIUM CHLORIDE, CALCIUM CHLORIDE 600; 310; 30; 20 MG/100ML; MG/100ML; MG/100ML; MG/100ML
INJECTION, SOLUTION INTRAVENOUS CONTINUOUS
Status: DISCONTINUED | OUTPATIENT
Start: 2021-04-16 | End: 2021-04-16 | Stop reason: HOSPADM

## 2021-04-16 RX ORDER — MIDAZOLAM HYDROCHLORIDE 1 MG/ML
INJECTION INTRAMUSCULAR; INTRAVENOUS PRN
Status: DISCONTINUED | OUTPATIENT
Start: 2021-04-16 | End: 2021-04-16 | Stop reason: ALTCHOICE

## 2021-04-16 RX ORDER — BUPIVACAINE HYDROCHLORIDE AND EPINEPHRINE 5; 5 MG/ML; UG/ML
INJECTION, SOLUTION EPIDURAL; INTRACAUDAL; PERINEURAL PRN
Status: DISCONTINUED | OUTPATIENT
Start: 2021-04-16 | End: 2021-04-16 | Stop reason: ALTCHOICE

## 2021-04-16 RX ORDER — LIDOCAINE HYDROCHLORIDE 20 MG/ML
INJECTION, SOLUTION EPIDURAL; INFILTRATION; INTRACAUDAL; PERINEURAL PRN
Status: DISCONTINUED | OUTPATIENT
Start: 2021-04-16 | End: 2021-04-16 | Stop reason: ALTCHOICE

## 2021-04-16 RX ORDER — CLINDAMYCIN PHOSPHATE 900 MG/50ML
900 INJECTION INTRAVENOUS ONCE
Status: DISCONTINUED | OUTPATIENT
Start: 2021-04-16 | End: 2021-04-16 | Stop reason: HOSPADM

## 2021-04-16 ASSESSMENT — PAIN DESCRIPTION - DIRECTION: RADIATING_TOWARDS: RIGHT LEG

## 2021-04-16 ASSESSMENT — PAIN DESCRIPTION - FREQUENCY: FREQUENCY: CONTINUOUS

## 2021-04-16 ASSESSMENT — PAIN DESCRIPTION - PAIN TYPE: TYPE: CHRONIC PAIN

## 2021-04-16 ASSESSMENT — PAIN DESCRIPTION - LOCATION: LOCATION: BACK

## 2021-04-16 ASSESSMENT — PAIN DESCRIPTION - DESCRIPTORS: DESCRIPTORS: ACHING

## 2021-04-16 NOTE — OP NOTE
Operative Note      Patient: Jojo Hayden  YOB: 1955  MRN: [id number]    Date of Procedure: 4/16/2021    Pre-Op Diagnosis: DX POST LAMINECTOMY SYNDROME    Post-Op Diagnosis: same       Procedure(s):  Krunal Valdez dr requests closer please   pocket revision  Surgeon(s):  Beckie Wu MD    Assistant:   Gareth Pink suture assistant  Anesthesia: IV Sedation    Estimated Blood Loss (mL): 20    Complications: none    Specimens:   none  Implants:  Implant Name Type Inv. Item Serial No.  Lot No. LRB No. Used Action   GENERATOR NEUROSTIMULATOR [id number] - [id number]  GENERATOR NEUROSTIMULATOR [id number]  N/A 1 Implanted         Drains: none    Findings: L5-S1 lumbar fusion intact hardware for spinal cord stimulator    Detailed Description of Procedure:   Patient has a long history of spinal cord stimulation placement for postlaminectomy syndrome lumbar pain with radiation to lower extremity. However her battery life has ended with the current spinal cord stimulation generator. And there has to be replacement revision. Informed consent obtained. Risk and benefit explained. Patient understood and agreed to the procedure. Peripheral IV placed by operating room staff. Clindamycin 900 mg IV piggyback on call to the OR. Taken to the operating room #4. Placed in prone position. Monitors applied. Midazolam 2 mg intravenous for procedure anxiolysis. Sterile prep and drape performed over the previous generator site. Using fluoroscopy guidance the spinal cord stimulator leads and the connecting wires to the generator were located. It was noticed that the patient has a paddle surgical lead which was appropriately placed in midline and the posterior epidural space at T7. All the connections under fluoroscopy guidance were intact.   Thereafter local anesthesia in the skin and subcutaneous tissue was given with 20 cc 2% buffered lidocaine in divided doses. Thereafter an incision was created over the previous generator pocket and care was taken not to dissect the connecting leads or wires. Thereafter the capsule of the previous generator was dissected. Hemostasis was achieved. The capsule was then revised to accommodate the new generator. Thereafter the old generator was unscrewed from the previous leads. The old generator was then discarded. Thereafter the new generator was connected to the spinal cord stimulator leads. Impedance was appropriate. All connections were appropriate. Thereafter the new generator was placed in the revised pocket. Again the impedance were checked. Which were appropriate. Thereafter the incision was closed in 2 layers. Antibiotic dressing applied. Discharged to recovery room and then home in stable condition. She underwent further reprogramming in the recovery room. She will follow with me on the scheduled appointment continue current treatment regimen.   She did receive 10 cc 0.5 Marcane with epinephrine in the incision for postoperative pain control     Electronically signed by Kimberley Vail MD on 4/16/2021 at 2:34 PM

## 2021-04-16 NOTE — H&P
14-Aug-2018 14:31 History and Physical Update    Pt Name: Britany Cooper  MRN: [id number]  YOB: 1955  Date of evaluation: 4/16/2021      [x] I have reviewed the Office Note found in [facility] dated 3/24/21 by Dr. Tip Miller which meets the criteria for an Interval History and Physical note and is attached below. Procedure: Spinal Cord Stimulator Battery Replacement  Dx: Post Laminectomy Syndrome  [x] I have examined  Britany Cooper and there are no changes to the patient or plans. Denies any recent illness fever or chills. Last took aspirin 2 days ago. Last ate and drank yesterday 5:30pm. Patient is type 1 diabetic has insulin pump, blood sugar in preop 92mg/dl/    Vital signs: /60   Pulse 79   Temp 96.9 °F (36.1 °C) (Temporal)   Resp 16   SpO2 97%     Allergies:  Actos [pioglitazone], Lisinopril, Metformin and related, and Zithromax [azithromycin]    Medications:   No current facility-administered medications on file prior to encounter.       Current Outpatient Medications on File Prior to Encounter   Medication Sig Dispense Refill    pantoprazole (PROTONIX) 40 MG tablet Take 1 tablet by mouth every morning (before breakfast) 90 tablet 1    Calcium Carb-Cholecalciferol (CALCIUM/VITAMIN D) 600-400 MG-UNIT TABS Take one tab po bid 60 tablet 5    furosemide (LASIX) 20 MG tablet take 1 tablet by mouth two times a week if needed for water retention 30 tablet 2    sertraline (ZOLOFT) 100 MG tablet take 1 tablet by mouth once daily 90 tablet 0    losartan-hydroCHLOROthiazide (HYZAAR) 50-12.5 MG per tablet take 1 tablet by mouth once daily 90 tablet 1    atorvastatin (LIPITOR) 40 MG tablet take 1 tablet by mouth once daily 90 tablet 1    metoclopramide (REGLAN) 5 MG tablet take 1 tablet by mouth twice a day 60 tablet 5    insulin aspart (NOVOLOG) 100 UNIT/ML injection vial INJECT 60 UNITS VIA PUMP ONCE DAILY 60 mL 3    levothyroxine (SYNTHROID) 88 MCG tablet take 1 tablet by mouth once daily 90 tablet 2    isosorbide mononitrate (IMDUR) 60 MG extended release tablet take 1 tablet by mouth once daily 90 tablet 0    metoprolol tartrate (LOPRESSOR) 25 MG tablet take 1 tablet by mouth twice a day 60 tablet 0    sucralfate (CARAFATE) 1 GM tablet Take 1 tablet by mouth 4 times daily 888 tablet 2    FOLIC ACID PO Take by mouth      cyclobenzaprine (FLEXERIL) 5 MG tablet Take 5 mg by mouth 3 times daily as needed for Muscle spasms      gabapentin (NEURONTIN) 400 MG capsule Take 400 mg by mouth 3 times daily      Omega-3 Fatty Acids (FISH OIL) 1000 MG CPDR Take  by mouth.  Continuous Blood Gluc Sensor (FREESTYLE SWETHA 14 DAY SENSOR) [facility] [place] APPLY 1 SENSOR TO THE BACK OF ARM. REMOVE AND REPLACE EVERY 14 DAYS. USE WITH DEVICE TO MONITOR BLOOD SUGAR 1 each 1    HYDROcodone-acetaminophen (NORCO) 7.5-325 MG per tablet take 1 tablet by mouth every 6 hours if needed  0    Continuous Blood Gluc  (FREESTYLE SWETHA 14 DAY READER) SYBIL USE AS DIRECTED. GIVE SENSORS ALSO 1 Device 0    aspirin 81 MG tablet Take by mouth daily              Prior to Admission medications    Medication Sig Start Date End Date Taking?  Authorizing Provider   pantoprazole (PROTONIX) 40 MG tablet Take 1 tablet by mouth every morning (before breakfast) 2/15/21  Yes LUCIE Case NP   Calcium Carb-Cholecalciferol (CALCIUM/VITAMIN D) 600-400 MG-UNIT TABS Take one tab po bid 11/16/20  Yes Robert Jett MD   furosemide (LASIX) 20 MG tablet take 1 tablet by mouth two times a week if needed for water retention 9/1/20  Yes Marlaine Precise, APRN - CNP   sertraline (ZOLOFT) 100 MG tablet take 1 tablet by mouth once daily 7/27/20  Yes Telma Leyva MD   losartan-hydroCHLOROthiazide (HYZAAR) 50-12.5 MG per tablet take 1 tablet by mouth once daily 6/23/20  Yes Marlaine Precise, APRN - CNP   atorvastatin (LIPITOR) 40 MG tablet take 1 tablet by mouth once daily 5/14/20  Yes Marlaine Precise, APRN - CNP   metoclopramide (REGLAN) 5 MG tablet take 1 tablet by mouth twice a day 2/27/20  Yes Audelia Blas MD   insulin aspart (NOVOLOG) 100 UNIT/ML injection vial INJECT 60 UNITS VIA PUMP ONCE DAILY 2/3/20  Yes Audelia Blas MD   levothyroxine (SYNTHROID) 88 MCG tablet take 1 tablet by mouth once daily 1/27/20  Yes Audelia Blas MD   isosorbide mononitrate (IMDUR) 60 MG extended release tablet take 1 tablet by mouth once daily 7/29/19  Yes Enoch York MD   metoprolol tartrate (LOPRESSOR) 25 MG tablet take 1 tablet by mouth twice a day 4/10/19  Yes Enoch York MD   sucralfate (CARAFATE) 1 GM tablet Take 1 tablet by mouth 4 times daily 12/6/17  Yes Audelia Blas MD   FOLIC ACID PO Take by mouth   Yes Historical Provider, MD   cyclobenzaprine (FLEXERIL) 5 MG tablet Take 5 mg by mouth 3 times daily as needed for Muscle spasms   Yes Historical Provider, MD   gabapentin (NEURONTIN) 400 MG capsule Take 400 mg by mouth 3 times daily   Yes Historical Provider, MD   Omega-3 Fatty Acids (FISH OIL) 1000 MG CPDR Take  by mouth. Yes Historical Provider, MD   Continuous Blood Gluc Sensor ([place]STYLE SWETHA 14 DAY SENSOR) [facility] [place] APPLY 1 SENSOR TO THE BACK OF ARM. REMOVE AND REPLACE EVERY 14 DAYS. USE WITH DEVICE TO MONITOR BLOOD SUGAR 6/22/20   Audelia Blas MD   HYDROcodone-acetaminophen (Jillyn Mention) 7.5-325 MG per tablet take 1 tablet by mouth every 6 hours if needed 1/17/19   Historical Provider, MD   Continuous Blood Gluc  (FREESTYLE SWETHA 14 DAY READER) SYBIL USE AS DIRECTED. GIVE SENSORS ALSO 1/16/19   Audelia Blas MD   aspirin 81 MG tablet Take by mouth daily    Historical Provider, MD       This is a 72 y.o. female who is pleasant, cooperative, alert and oriented x3, in no acute distress. Heart: Heart regular rate and rhythm   Lungs:clear to auscultation without wheezes or rales    Abdomen: soft, nontender, nondistended, no masses or organomegaly.        No results for input(s): COVID19 in the last 720 hours.    Curlie Blizzard, APRN - CNP  Electronically signed 4/16/2021 at 11:44 AM

## 2021-04-16 NOTE — PROGRESS NOTES
RADIOLOGY CALLED MULTIPLE TIMES FOR C-ARM IN OR 4, NO ANSWER. KADY AT [employer]K CALLED FOR HELP FINDING RADIOLOGY TECH FOR OUR CASE IN OR 4.

## 2021-05-21 ENCOUNTER — TELEPHONE (OUTPATIENT)
Dept: FAMILY MEDICINE CLINIC | Age: 66
End: 2021-05-21

## 2021-05-21 DIAGNOSIS — E10.9 TYPE 1 DIABETES MELLITUS WITHOUT COMPLICATION (HCC): Primary | ICD-10-CM

## 2021-05-21 NOTE — TELEPHONE ENCOUNTER
RECEIVED A FAX FROM [address] [place] [address] REQUESTING THAT AN ORDER FOR CREATININE BE FAXED [phone] SO SHE CAN GET IT DONE ON Monday BEFORE HER CT WITH CONTRAST OF ABDOMEN CAN BE DONE ON 05/27/21. PLEASE ADVISE.

## 2021-05-24 LAB — CREAT SERPL-MCNC: 0.85 MG/DL

## 2021-05-25 DIAGNOSIS — E10.9 TYPE 1 DIABETES MELLITUS WITHOUT COMPLICATION (HCC): ICD-10-CM

## 2021-05-31 ENCOUNTER — HOSPITAL ENCOUNTER (EMERGENCY)
Age: 66
Discharge: ANOTHER HOSPITAL | End: 2021-05-31
Attending: EMERGENCY MEDICINE
Payer: COMMERCIAL

## 2021-05-31 ENCOUNTER — HOSPITAL ENCOUNTER (INPATIENT)
Age: 66
LOS: 5 days | Discharge: HOME HEALTH | DRG: 028 | End: 2021-06-05
Attending: FAMILY MEDICINE | Admitting: FAMILY MEDICINE
Payer: COMMERCIAL

## 2021-05-31 VITALS
WEIGHT: 183 LBS | DIASTOLIC BLOOD PRESSURE: 155 MMHG | BODY MASS INDEX: 33.68 KG/M2 | OXYGEN SATURATION: 98 % | SYSTOLIC BLOOD PRESSURE: 183 MMHG | RESPIRATION RATE: 19 BRPM | HEART RATE: 85 BPM | HEIGHT: 62 IN | TEMPERATURE: 98.6 F

## 2021-05-31 DIAGNOSIS — T84.7XXA WOUND INFECTION COMPLICATING HARDWARE, INITIAL ENCOUNTER (HCC): Primary | ICD-10-CM

## 2021-05-31 PROBLEM — L08.9 SOFT TISSUE INFECTION: Status: ACTIVE | Noted: 2021-05-31

## 2021-05-31 LAB
ABSOLUTE EOS #: 0.1 K/UL (ref 0–0.4)
ABSOLUTE IMMATURE GRANULOCYTE: ABNORMAL K/UL (ref 0–0.3)
ABSOLUTE LYMPH #: 1.3 K/UL (ref 1–4.8)
ABSOLUTE MONO #: 1 K/UL (ref 0.1–1.3)
ALBUMIN SERPL-MCNC: 3.8 G/DL (ref 3.5–5.2)
ALBUMIN/GLOBULIN RATIO: ABNORMAL (ref 1–2.5)
ALP BLD-CCNC: 83 U/L (ref 35–104)
ALT SERPL-CCNC: 19 U/L (ref 5–33)
ANION GAP SERPL CALCULATED.3IONS-SCNC: 13 MMOL/L (ref 9–17)
AST SERPL-CCNC: 18 U/L
BASOPHILS # BLD: 1 % (ref 0–2)
BASOPHILS ABSOLUTE: 0 K/UL (ref 0–0.2)
BETA-HYDROXYBUTYRATE: 0.05 MMOL/L (ref 0.02–0.27)
BILIRUB SERPL-MCNC: 0.63 MG/DL (ref 0.3–1.2)
BUN BLDV-MCNC: 19 MG/DL (ref 8–23)
BUN/CREAT BLD: ABNORMAL (ref 9–20)
C-REACTIVE PROTEIN: 168.2 MG/L (ref 0–5)
CALCIUM SERPL-MCNC: 9.4 MG/DL (ref 8.6–10.4)
CHLORIDE BLD-SCNC: 94 MMOL/L (ref 98–107)
CO2: 24 MMOL/L (ref 20–31)
CREAT SERPL-MCNC: 0.9 MG/DL (ref 0.5–0.9)
DIFFERENTIAL TYPE: ABNORMAL
EOSINOPHILS RELATIVE PERCENT: 1 % (ref 0–4)
GFR AFRICAN AMERICAN: >60 ML/MIN
GFR NON-AFRICAN AMERICAN: >60 ML/MIN
GFR SERPL CREATININE-BSD FRML MDRD: ABNORMAL ML/MIN/{1.73_M2}
GFR SERPL CREATININE-BSD FRML MDRD: ABNORMAL ML/MIN/{1.73_M2}
GLUCOSE BLD-MCNC: 367 MG/DL (ref 70–99)
GLUCOSE BLD-MCNC: 83 MG/DL (ref 65–105)
HCT VFR BLD CALC: 32.4 % (ref 36–46)
HEMOGLOBIN: 10.9 G/DL (ref 12–16)
IMMATURE GRANULOCYTES: ABNORMAL %
LACTIC ACID, SEPSIS WHOLE BLOOD: ABNORMAL MMOL/L (ref 0.5–1.9)
LACTIC ACID, SEPSIS WHOLE BLOOD: NORMAL MMOL/L (ref 0.5–1.9)
LACTIC ACID, SEPSIS: 1.3 MMOL/L (ref 0.5–1.9)
LACTIC ACID, SEPSIS: 2.6 MMOL/L (ref 0.5–1.9)
LYMPHOCYTES # BLD: 14 % (ref 24–44)
MAGNESIUM: 2.1 MG/DL (ref 1.6–2.6)
MCH RBC QN AUTO: 28.9 PG (ref 26–34)
MCHC RBC AUTO-ENTMCNC: 33.5 G/DL (ref 31–37)
MCV RBC AUTO: 86.2 FL (ref 80–100)
MONOCYTES # BLD: 10 % (ref 1–7)
NRBC AUTOMATED: ABNORMAL PER 100 WBC
PDW BLD-RTO: 13.4 % (ref 11.5–14.9)
PLATELET # BLD: 181 K/UL (ref 150–450)
PLATELET ESTIMATE: ABNORMAL
PMV BLD AUTO: 7.5 FL (ref 6–12)
POTASSIUM SERPL-SCNC: 3.8 MMOL/L (ref 3.7–5.3)
RBC # BLD: 3.76 M/UL (ref 4–5.2)
RBC # BLD: ABNORMAL 10*6/UL
SEDIMENTATION RATE, ERYTHROCYTE: 33 MM (ref 0–30)
SEG NEUTROPHILS: 74 % (ref 36–66)
SEGMENTED NEUTROPHILS ABSOLUTE COUNT: 6.9 K/UL (ref 1.3–9.1)
SODIUM BLD-SCNC: 131 MMOL/L (ref 135–144)
TOTAL PROTEIN: 7 G/DL (ref 6.4–8.3)
WBC # BLD: 9.3 K/UL (ref 3.5–11)
WBC # BLD: ABNORMAL 10*3/UL

## 2021-05-31 PROCEDURE — 86140 C-REACTIVE PROTEIN: CPT

## 2021-05-31 PROCEDURE — 83735 ASSAY OF MAGNESIUM: CPT

## 2021-05-31 PROCEDURE — 87040 BLOOD CULTURE FOR BACTERIA: CPT

## 2021-05-31 PROCEDURE — 83605 ASSAY OF LACTIC ACID: CPT

## 2021-05-31 PROCEDURE — 6360000002 HC RX W HCPCS: Performed by: EMERGENCY MEDICINE

## 2021-05-31 PROCEDURE — 96366 THER/PROPH/DIAG IV INF ADDON: CPT

## 2021-05-31 PROCEDURE — 96365 THER/PROPH/DIAG IV INF INIT: CPT

## 2021-05-31 PROCEDURE — 2580000003 HC RX 258: Performed by: PHYSICIAN ASSISTANT

## 2021-05-31 PROCEDURE — 96368 THER/DIAG CONCURRENT INF: CPT

## 2021-05-31 PROCEDURE — 2580000003 HC RX 258: Performed by: EMERGENCY MEDICINE

## 2021-05-31 PROCEDURE — 6360000002 HC RX W HCPCS: Performed by: PHYSICIAN ASSISTANT

## 2021-05-31 PROCEDURE — 6370000000 HC RX 637 (ALT 250 FOR IP): Performed by: INTERNAL MEDICINE

## 2021-05-31 PROCEDURE — 1200000000 HC SEMI PRIVATE

## 2021-05-31 PROCEDURE — 36415 COLL VENOUS BLD VENIPUNCTURE: CPT

## 2021-05-31 PROCEDURE — 99222 1ST HOSP IP/OBS MODERATE 55: CPT | Performed by: NURSE PRACTITIONER

## 2021-05-31 PROCEDURE — 96367 TX/PROPH/DG ADDL SEQ IV INF: CPT

## 2021-05-31 PROCEDURE — 85025 COMPLETE CBC W/AUTO DIFF WBC: CPT

## 2021-05-31 PROCEDURE — 6370000000 HC RX 637 (ALT 250 FOR IP): Performed by: NURSE PRACTITIONER

## 2021-05-31 PROCEDURE — 99285 EMERGENCY DEPT VISIT HI MDM: CPT

## 2021-05-31 PROCEDURE — 80053 COMPREHEN METABOLIC PANEL: CPT

## 2021-05-31 PROCEDURE — 85652 RBC SED RATE AUTOMATED: CPT

## 2021-05-31 PROCEDURE — 82947 ASSAY GLUCOSE BLOOD QUANT: CPT

## 2021-05-31 PROCEDURE — 82010 KETONE BODYS QUAN: CPT

## 2021-05-31 PROCEDURE — 2500000003 HC RX 250 WO HCPCS: Performed by: EMERGENCY MEDICINE

## 2021-05-31 RX ORDER — ONDANSETRON 2 MG/ML
4 INJECTION INTRAMUSCULAR; INTRAVENOUS EVERY 6 HOURS PRN
Status: DISCONTINUED | OUTPATIENT
Start: 2021-05-31 | End: 2021-06-05 | Stop reason: HOSPADM

## 2021-05-31 RX ORDER — POLYETHYLENE GLYCOL 3350 17 G/17G
17 POWDER, FOR SOLUTION ORAL DAILY PRN
Status: DISCONTINUED | OUTPATIENT
Start: 2021-05-31 | End: 2021-06-05 | Stop reason: HOSPADM

## 2021-05-31 RX ORDER — POTASSIUM CHLORIDE 20 MEQ/1
40 TABLET, EXTENDED RELEASE ORAL PRN
Status: DISCONTINUED | OUTPATIENT
Start: 2021-05-31 | End: 2021-06-05 | Stop reason: HOSPADM

## 2021-05-31 RX ORDER — NICOTINE 21 MG/24HR
1 PATCH, TRANSDERMAL 24 HOURS TRANSDERMAL DAILY PRN
Status: DISCONTINUED | OUTPATIENT
Start: 2021-05-31 | End: 2021-06-05 | Stop reason: HOSPADM

## 2021-05-31 RX ORDER — PANTOPRAZOLE SODIUM 40 MG/1
40 TABLET, DELAYED RELEASE ORAL
Status: DISCONTINUED | OUTPATIENT
Start: 2021-06-01 | End: 2021-06-05 | Stop reason: HOSPADM

## 2021-05-31 RX ORDER — HYDROCODONE BITARTRATE AND ACETAMINOPHEN 5; 325 MG/1; MG/1
1 TABLET ORAL EVERY 6 HOURS PRN
Status: DISCONTINUED | OUTPATIENT
Start: 2021-05-31 | End: 2021-05-31

## 2021-05-31 RX ORDER — OXYCODONE HYDROCHLORIDE AND ACETAMINOPHEN 5; 325 MG/1; MG/1
1 TABLET ORAL EVERY 4 HOURS PRN
Status: DISCONTINUED | OUTPATIENT
Start: 2021-05-31 | End: 2021-06-05 | Stop reason: HOSPADM

## 2021-05-31 RX ORDER — METOCLOPRAMIDE 5 MG/1
5 TABLET ORAL 2 TIMES DAILY
Status: DISCONTINUED | OUTPATIENT
Start: 2021-05-31 | End: 2021-06-05 | Stop reason: HOSPADM

## 2021-05-31 RX ORDER — ACETAMINOPHEN 650 MG/1
650 SUPPOSITORY RECTAL EVERY 6 HOURS PRN
Status: DISCONTINUED | OUTPATIENT
Start: 2021-05-31 | End: 2021-06-05 | Stop reason: HOSPADM

## 2021-05-31 RX ORDER — ISOSORBIDE MONONITRATE 60 MG/1
60 TABLET, EXTENDED RELEASE ORAL DAILY
Status: DISCONTINUED | OUTPATIENT
Start: 2021-06-01 | End: 2021-06-05 | Stop reason: HOSPADM

## 2021-05-31 RX ORDER — MORPHINE SULFATE 2 MG/ML
2 INJECTION, SOLUTION INTRAMUSCULAR; INTRAVENOUS EVERY 4 HOURS PRN
Status: DISCONTINUED | OUTPATIENT
Start: 2021-05-31 | End: 2021-06-05 | Stop reason: HOSPADM

## 2021-05-31 RX ORDER — 0.9 % SODIUM CHLORIDE 0.9 %
1000 INTRAVENOUS SOLUTION INTRAVENOUS ONCE
Status: COMPLETED | OUTPATIENT
Start: 2021-05-31 | End: 2021-05-31

## 2021-05-31 RX ORDER — DEXTROSE MONOHYDRATE 50 MG/ML
100 INJECTION, SOLUTION INTRAVENOUS PRN
Status: DISCONTINUED | OUTPATIENT
Start: 2021-05-31 | End: 2021-06-04 | Stop reason: SDUPTHER

## 2021-05-31 RX ORDER — NICOTINE POLACRILEX 4 MG
15 LOZENGE BUCCAL PRN
Status: DISCONTINUED | OUTPATIENT
Start: 2021-05-31 | End: 2021-06-04 | Stop reason: SDUPTHER

## 2021-05-31 RX ORDER — METOCLOPRAMIDE 5 MG/1
5 TABLET ORAL
Status: DISCONTINUED | OUTPATIENT
Start: 2021-06-01 | End: 2021-05-31

## 2021-05-31 RX ORDER — SODIUM CHLORIDE 9 MG/ML
25 INJECTION, SOLUTION INTRAVENOUS PRN
Status: DISCONTINUED | OUTPATIENT
Start: 2021-05-31 | End: 2021-06-05 | Stop reason: HOSPADM

## 2021-05-31 RX ORDER — SODIUM CHLORIDE 0.9 % (FLUSH) 0.9 %
5-40 SYRINGE (ML) INJECTION EVERY 12 HOURS SCHEDULED
Status: DISCONTINUED | OUTPATIENT
Start: 2021-05-31 | End: 2021-06-05 | Stop reason: HOSPADM

## 2021-05-31 RX ORDER — ATORVASTATIN CALCIUM 40 MG/1
40 TABLET, FILM COATED ORAL NIGHTLY
Status: DISCONTINUED | OUTPATIENT
Start: 2021-05-31 | End: 2021-06-05 | Stop reason: HOSPADM

## 2021-05-31 RX ORDER — SODIUM CHLORIDE 0.9 % (FLUSH) 0.9 %
10 SYRINGE (ML) INJECTION PRN
Status: DISCONTINUED | OUTPATIENT
Start: 2021-05-31 | End: 2021-06-04 | Stop reason: SDUPTHER

## 2021-05-31 RX ORDER — PROMETHAZINE HYDROCHLORIDE 12.5 MG/1
12.5 TABLET ORAL EVERY 6 HOURS PRN
Status: DISCONTINUED | OUTPATIENT
Start: 2021-05-31 | End: 2021-06-05 | Stop reason: HOSPADM

## 2021-05-31 RX ORDER — MAGNESIUM SULFATE 1 G/100ML
1000 INJECTION INTRAVENOUS PRN
Status: DISCONTINUED | OUTPATIENT
Start: 2021-05-31 | End: 2021-06-05 | Stop reason: HOSPADM

## 2021-05-31 RX ORDER — POTASSIUM CHLORIDE 7.45 MG/ML
10 INJECTION INTRAVENOUS PRN
Status: DISCONTINUED | OUTPATIENT
Start: 2021-05-31 | End: 2021-06-05 | Stop reason: HOSPADM

## 2021-05-31 RX ORDER — DEXTROSE MONOHYDRATE 25 G/50ML
12.5 INJECTION, SOLUTION INTRAVENOUS PRN
Status: DISCONTINUED | OUTPATIENT
Start: 2021-05-31 | End: 2021-06-04 | Stop reason: SDUPTHER

## 2021-05-31 RX ORDER — GABAPENTIN 400 MG/1
400 CAPSULE ORAL 3 TIMES DAILY
Status: DISCONTINUED | OUTPATIENT
Start: 2021-05-31 | End: 2021-06-05 | Stop reason: HOSPADM

## 2021-05-31 RX ORDER — ACETAMINOPHEN 325 MG/1
650 TABLET ORAL EVERY 6 HOURS PRN
Status: DISCONTINUED | OUTPATIENT
Start: 2021-05-31 | End: 2021-06-05 | Stop reason: HOSPADM

## 2021-05-31 RX ADMIN — SODIUM CHLORIDE, PRESERVATIVE FREE 10 ML: 5 INJECTION INTRAVENOUS at 22:24

## 2021-05-31 RX ADMIN — METRONIDAZOLE 500 MG: 500 INJECTION, SOLUTION INTRAVENOUS at 15:13

## 2021-05-31 RX ADMIN — PIPERACILLIN AND TAZOBACTAM 3375 MG: 3; .375 INJECTION, POWDER, LYOPHILIZED, FOR SOLUTION INTRAVENOUS at 22:24

## 2021-05-31 RX ADMIN — DESMOPRESSIN ACETATE 40 MG: 0.2 TABLET ORAL at 23:32

## 2021-05-31 RX ADMIN — GABAPENTIN 400 MG: 400 CAPSULE ORAL at 23:32

## 2021-05-31 RX ADMIN — SODIUM CHLORIDE, PRESERVATIVE FREE 10 ML: 5 INJECTION INTRAVENOUS at 22:25

## 2021-05-31 RX ADMIN — METOPROLOL TARTRATE 25 MG: 25 TABLET ORAL at 23:32

## 2021-05-31 RX ADMIN — METOCLOPRAMIDE 5 MG: 5 TABLET ORAL at 23:39

## 2021-05-31 RX ADMIN — SODIUM CHLORIDE 1000 ML: 9 INJECTION, SOLUTION INTRAVENOUS at 15:13

## 2021-05-31 RX ADMIN — OXYCODONE HYDROCHLORIDE AND ACETAMINOPHEN 1 TABLET: 5; 325 TABLET ORAL at 23:32

## 2021-05-31 RX ADMIN — HYDROMORPHONE HYDROCHLORIDE 1 MG: 1 INJECTION, SOLUTION INTRAMUSCULAR; INTRAVENOUS; SUBCUTANEOUS at 15:02

## 2021-05-31 RX ADMIN — CEFEPIME HYDROCHLORIDE 2000 MG: 2 INJECTION, POWDER, FOR SOLUTION INTRAVENOUS at 15:13

## 2021-05-31 RX ADMIN — VANCOMYCIN HYDROCHLORIDE 2000 MG: 1 INJECTION, POWDER, LYOPHILIZED, FOR SOLUTION INTRAVENOUS at 17:34

## 2021-05-31 ASSESSMENT — PAIN DESCRIPTION - ONSET: ONSET: ON-GOING

## 2021-05-31 ASSESSMENT — PAIN SCALES - GENERAL
PAINLEVEL_OUTOF10: 10

## 2021-05-31 ASSESSMENT — PAIN DESCRIPTION - DESCRIPTORS: DESCRIPTORS: ACHING;CONSTANT;DISCOMFORT

## 2021-05-31 ASSESSMENT — PAIN - FUNCTIONAL ASSESSMENT: PAIN_FUNCTIONAL_ASSESSMENT: ACTIVITIES ARE NOT PREVENTED

## 2021-05-31 ASSESSMENT — PAIN DESCRIPTION - PROGRESSION: CLINICAL_PROGRESSION: NOT CHANGED

## 2021-05-31 ASSESSMENT — PAIN DESCRIPTION - LOCATION: LOCATION: BACK

## 2021-05-31 ASSESSMENT — PAIN DESCRIPTION - ORIENTATION: ORIENTATION: LEFT;LOWER

## 2021-05-31 ASSESSMENT — PAIN DESCRIPTION - PAIN TYPE: TYPE: ACUTE PAIN

## 2021-05-31 ASSESSMENT — PAIN DESCRIPTION - FREQUENCY: FREQUENCY: CONTINUOUS

## 2021-05-31 NOTE — ED NOTES
[facility] assigned at [place] [id number] for report.       Santino Butcher RN  05/31/21 [id number]

## 2021-05-31 NOTE — ED TRIAGE NOTES
Pt arrives to ED for lower back pain. States that she just had a stimulator put in her back 2 months ago and that she thinks it's infected now. Pt has redness and warmth around the site and states that she has had some purulent drainage from site as well. Pt is c/o pain and tenderness in her lower back and has been having trouble getting around due to the pain. Pt is PWD, afebrile, CGS-15.

## 2021-05-31 NOTE — ED PROVIDER NOTES
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  ENDOSCOPY, COLON, DIAGNOSTIC      EGD    JOINT REPLACEMENT Left     knee    OTHER SURGICAL HISTORY  11/21/2016    Revision of spinal cord stimulator    SPINAL CORD STIMULATOR SURGERY N/A 4/16/2021    SPINAL CORD STIMULATOR  BATTERY REPLACEMENT - ABBOTT  -   dr najera closer please performed by Nicho Hoffman MD at [address] [place] [address]  08/16/2016    NO LESIONS SEEN    UPPER GASTROINTESTINAL ENDOSCOPY  01/14/2021    ESOPHAGEAL POLYP - DR KARLEY MARTINS     CURRENT MEDICATIONS       Previous Medications    ASPIRIN 81 MG TABLET    Take by mouth daily    ATORVASTATIN (LIPITOR) 40 MG TABLET    take 1 tablet by mouth once daily    CALCIUM CARB-CHOLECALCIFEROL (CALCIUM/VITAMIN D) 600-400 MG-UNIT TABS    Take one tab po bid    CONTINUOUS BLOOD GLUC  (FREESTYLE SWETHA 14 DAY READER) SYBIL    USE AS DIRECTED. GIVE SENSORS ALSO    CONTINUOUS BLOOD GLUC SENSOR (FREESTYLE SWETHA 14 DAY SENSOR) MISC    APPLY 1 SENSOR TO THE BACK OF ARM. REMOVE AND REPLACE EVERY 14 DAYS.  USE WITH DEVICE TO MONITOR BLOOD SUGAR    CYCLOBENZAPRINE (FLEXERIL) 5 MG TABLET    Take 5 mg by mouth 3 times daily as needed for Muscle spasms    FOLIC ACID PO    Take by mouth    FUROSEMIDE (LASIX) 20 MG TABLET    take 1 tablet by mouth two times a week if needed for water retention    GABAPENTIN (NEURONTIN) 400 MG CAPSULE    Take 400 mg by mouth 3 times daily    HYDROCODONE-ACETAMINOPHEN (NORCO) 7.5-325 MG PER TABLET    take 1 tablet by mouth every 6 hours if needed    INSULIN ASPART (NOVOLOG) 100 UNIT/ML INJECTION VIAL    INJECT 60 UNITS VIA PUMP ONCE DAILY    ISOSORBIDE MONONITRATE (IMDUR) 60 MG EXTENDED RELEASE TABLET    take 1 tablet by mouth once daily    LEVOTHYROXINE (SYNTHROID) 88 MCG TABLET    take 1 tablet by mouth once daily    LOSARTAN-HYDROCHLOROTHIAZIDE (HYZAAR) 50-12.5 MG PER TABLET    take 1 tablet by mouth once daily    METOCLOPRAMIDE (REGLAN) 5 MG TABLET    take 1 tablet by mouth twice a day    METOPROLOL TARTRATE (LOPRESSOR) 25 MG TABLET    take 1 tablet by mouth twice a day    OMEGA-3 FATTY ACIDS (FISH OIL) 1000 MG CPDR    Take  by mouth. PANTOPRAZOLE (PROTONIX) 40 MG TABLET    Take 1 tablet by mouth every morning (before breakfast)    SERTRALINE (ZOLOFT) 100 MG TABLET    take 1 tablet by mouth once daily    SUCRALFATE (CARAFATE) 1 GM TABLET    Take 1 tablet by mouth 4 times daily     ALLERGIES     is allergic to actos [pioglitazone], lisinopril, metformin and related, and zithromax [azithromycin]. FAMILY HISTORY     She indicated that her mother is . She indicated that her father is . She indicated that both of her brothers are alive. SOCIAL HISTORY       Social History     Tobacco Use    Smoking status: Never Smoker    Smokeless tobacco: Never Used   Vaping Use    Vaping Use: Never used   Substance Use Topics    Alcohol use: No    Drug use: No     PHYSICAL EXAM     INITIAL VITALS: BP (!) 105/59   Pulse 79   Temp 98.6 °F (37 °C) (Oral)   Resp 21   Ht 5' 2\" (1.575 m)   Wt 183 lb (83 kg)   SpO2 96%   BMI 33.47 kg/m²    Physical Exam  Vitals reviewed. Constitutional:       General: She is not in acute distress. Appearance: Normal appearance. She is well-developed. She is not diaphoretic. HENT:      Head: Normocephalic and atraumatic. Right Ear: External ear normal.      Left Ear: External ear normal.   Eyes:      General:         Right eye: No discharge. Left eye: No discharge. Conjunctiva/sclera: Conjunctivae normal.   Neck:      Trachea: No tracheal deviation. Cardiovascular:      Rate and Rhythm: Normal rate and regular rhythm. Pulses: Normal pulses. Heart sounds: Normal heart sounds. Pulmonary:      Effort: Pulmonary effort is normal. No respiratory distress. Breath sounds: Normal breath sounds. No stridor. No wheezing or rales. Abdominal:      Palpations: Abdomen is soft. Tenderness: There is no abdominal tenderness. There is no guarding or rebound. Comments: LLQ insulin pump   Musculoskeletal:         General: No deformity. Normal range of motion. Cervical back: Normal range of motion and neck supple. Comments: Tenderness to back stimulator sites on the right low back and right mid back, some erythema and edema of these sites also   Skin:     General: Skin is warm and dry. Capillary Refill: Capillary refill takes less than 2 seconds. Findings: No erythema or rash. Neurological:      General: No focal deficit present. Mental Status: She is alert and oriented to person, place, and time. Cranial Nerves: No cranial nerve deficit. Coordination: Coordination normal.      Comments: GCS 15  Strength in arms 5/5  Strength in legs 5/5  Sensation intact bl arms and legs  No facial droop  Speech is clear, no dysarthria or aphasia  No ataxia in arms or legs  No gaze preference or nystagums  Visual fields intact  Normal gait in the ED   Psychiatric:         Mood and Affect: Mood normal.         Behavior: Behavior normal.         Thought Content:  Thought content normal. department:  Orders Placed This Encounter   Medications    0.9 % sodium chloride bolus    HYDROmorphone (DILAUDID) injection 1 mg    cefepime (MAXIPIME) 2000 mg IVPB minibag     Order Specific Question:   Antimicrobial Indications     Answer: Other     Order Specific Question:   Other Abx Indication     Answer:   infected nerve stimulator    metronidazole (FLAGYL) 500 mg in NaCl 100 mL IVPB premix     Order Specific Question:   Antimicrobial Indications     Answer: Other     Order Specific Question:   Other Abx Indication     Answer:   infected nerve stimulator    vancomycin (VANCOCIN) intermittent dosing (placeholder)     Order Specific Question:   Antimicrobial Indications     Answer:   Skin and Soft Tissue Infection    vancomycin (VANCOCIN) 2,000 mg in dextrose 5 % 500 mL IVPB     Order Specific Question:   Antimicrobial Indications     Answer:   Skin and Soft Tissue Infection     CONSULTS:  PHARMACY TO DOSE VANCOMYCIN  IP CONSULT TO ORTHOPEDIC SURGERY    FINAL IMPRESSION      1. Wound infection complicating hardware, initial encounter ([facility] [address]          DISPOSITION/PLAN   DISPOSITION        PATIENT REFERRED TO:  No follow-up provider specified.   DISCHARGE MEDICATIONS:  New Prescriptions    No medications on file     Naif Velez MD  Attending Emergency Physician                    Naif Velez MD  05/31/21 1651      5:05 PM EDT  [facility] accepts transfer     Naif Velez MD  05/31/21 [phone]

## 2021-06-01 ENCOUNTER — APPOINTMENT (OUTPATIENT)
Dept: GENERAL RADIOLOGY | Age: 66
DRG: 028 | End: 2021-06-01
Attending: FAMILY MEDICINE
Payer: COMMERCIAL

## 2021-06-01 ENCOUNTER — ANESTHESIA (OUTPATIENT)
Dept: OPERATING ROOM | Age: 66
DRG: 028 | End: 2021-06-01
Payer: COMMERCIAL

## 2021-06-01 ENCOUNTER — ANESTHESIA EVENT (OUTPATIENT)
Dept: OPERATING ROOM | Age: 66
DRG: 028 | End: 2021-06-01
Payer: COMMERCIAL

## 2021-06-01 ENCOUNTER — APPOINTMENT (OUTPATIENT)
Dept: CT IMAGING | Age: 66
DRG: 028 | End: 2021-06-01
Attending: FAMILY MEDICINE
Payer: COMMERCIAL

## 2021-06-01 VITALS — OXYGEN SATURATION: 91 % | DIASTOLIC BLOOD PRESSURE: 71 MMHG | SYSTOLIC BLOOD PRESSURE: 137 MMHG | TEMPERATURE: 98.1 F

## 2021-06-01 PROBLEM — T85.733A INFECTION OF SPINAL CORD STIMULATOR (HCC): Status: ACTIVE | Noted: 2021-06-01

## 2021-06-01 LAB
ABO/RH: NORMAL
ANION GAP SERPL CALCULATED.3IONS-SCNC: 9 MMOL/L (ref 9–17)
ANTIBODY SCREEN: NEGATIVE
ARM BAND NUMBER: NORMAL
BLOOD BANK SPECIMEN: NORMAL
BUN BLDV-MCNC: 14 MG/DL (ref 8–23)
BUN/CREAT BLD: ABNORMAL (ref 9–20)
CALCIUM SERPL-MCNC: 8.6 MG/DL (ref 8.6–10.4)
CHLORIDE BLD-SCNC: 99 MMOL/L (ref 98–107)
CO2: 24 MMOL/L (ref 20–31)
CREAT SERPL-MCNC: 0.77 MG/DL (ref 0.5–0.9)
EXPIRATION DATE: NORMAL
GFR AFRICAN AMERICAN: >60 ML/MIN
GFR NON-AFRICAN AMERICAN: >60 ML/MIN
GFR SERPL CREATININE-BSD FRML MDRD: ABNORMAL ML/MIN/{1.73_M2}
GFR SERPL CREATININE-BSD FRML MDRD: ABNORMAL ML/MIN/{1.73_M2}
GLUCOSE BLD-MCNC: 158 MG/DL (ref 65–105)
GLUCOSE BLD-MCNC: 158 MG/DL (ref 65–105)
GLUCOSE BLD-MCNC: 196 MG/DL (ref 70–99)
GLUCOSE BLD-MCNC: 74 MG/DL (ref 65–105)
HCT VFR BLD CALC: 29.5 % (ref 36.3–47.1)
HEMOGLOBIN: 10.1 G/DL (ref 11.9–15.1)
INR BLD: 0.9
MCH RBC QN AUTO: 29.2 PG (ref 25.2–33.5)
MCHC RBC AUTO-ENTMCNC: 34.2 G/DL (ref 28.4–34.8)
MCV RBC AUTO: 85.3 FL (ref 82.6–102.9)
NRBC AUTOMATED: 0 PER 100 WBC
PARTIAL THROMBOPLASTIN TIME: 22.1 SEC (ref 20.5–30.5)
PDW BLD-RTO: 12.8 % (ref 11.8–14.4)
PLATELET # BLD: 171 K/UL (ref 138–453)
PMV BLD AUTO: 10.1 FL (ref 8.1–13.5)
POTASSIUM SERPL-SCNC: 3.7 MMOL/L (ref 3.7–5.3)
PROTHROMBIN TIME: 10 SEC (ref 9.1–12.3)
RBC # BLD: 3.46 M/UL (ref 3.95–5.11)
SARS-COV-2, RAPID: NOT DETECTED
SODIUM BLD-SCNC: 132 MMOL/L (ref 135–144)
SPECIMEN DESCRIPTION: NORMAL
WBC # BLD: 9.5 K/UL (ref 3.5–11.3)

## 2021-06-01 PROCEDURE — 1200000000 HC SEMI PRIVATE

## 2021-06-01 PROCEDURE — 82947 ASSAY GLUCOSE BLOOD QUANT: CPT

## 2021-06-01 PROCEDURE — 2500000003 HC RX 250 WO HCPCS: Performed by: NEUROLOGICAL SURGERY

## 2021-06-01 PROCEDURE — 2580000003 HC RX 258: Performed by: STUDENT IN AN ORGANIZED HEALTH CARE EDUCATION/TRAINING PROGRAM

## 2021-06-01 PROCEDURE — 6360000002 HC RX W HCPCS: Performed by: ANESTHESIOLOGY

## 2021-06-01 PROCEDURE — 87635 SARS-COV-2 COVID-19 AMP PRB: CPT

## 2021-06-01 PROCEDURE — 2709999900 HC NON-CHARGEABLE SUPPLY: Performed by: NEUROLOGICAL SURGERY

## 2021-06-01 PROCEDURE — 6370000000 HC RX 637 (ALT 250 FOR IP): Performed by: STUDENT IN AN ORGANIZED HEALTH CARE EDUCATION/TRAINING PROGRAM

## 2021-06-01 PROCEDURE — 6360000002 HC RX W HCPCS: Performed by: NURSE ANESTHETIST, CERTIFIED REGISTERED

## 2021-06-01 PROCEDURE — 2580000003 HC RX 258: Performed by: PHYSICIAN ASSISTANT

## 2021-06-01 PROCEDURE — 93005 ELECTROCARDIOGRAM TRACING: CPT | Performed by: NURSE PRACTITIONER

## 2021-06-01 PROCEDURE — 71045 X-RAY EXAM CHEST 1 VIEW: CPT

## 2021-06-01 PROCEDURE — 0JPT0MZ REMOVAL OF STIMULATOR GENERATOR FROM TRUNK SUBCUTANEOUS TISSUE AND FASCIA, OPEN APPROACH: ICD-10-PCS | Performed by: NEUROLOGICAL SURGERY

## 2021-06-01 PROCEDURE — 3700000000 HC ANESTHESIA ATTENDED CARE: Performed by: NEUROLOGICAL SURGERY

## 2021-06-01 PROCEDURE — 86901 BLOOD TYPING SEROLOGIC RH(D): CPT

## 2021-06-01 PROCEDURE — 6360000002 HC RX W HCPCS: Performed by: STUDENT IN AN ORGANIZED HEALTH CARE EDUCATION/TRAINING PROGRAM

## 2021-06-01 PROCEDURE — 2580000003 HC RX 258: Performed by: ANESTHESIOLOGY

## 2021-06-01 PROCEDURE — 86900 BLOOD TYPING SEROLOGIC ABO: CPT

## 2021-06-01 PROCEDURE — 87070 CULTURE OTHR SPECIMN AEROBIC: CPT

## 2021-06-01 PROCEDURE — 86403 PARTICLE AGGLUT ANTBDY SCRN: CPT

## 2021-06-01 PROCEDURE — 99232 SBSQ HOSP IP/OBS MODERATE 35: CPT | Performed by: FAMILY MEDICINE

## 2021-06-01 PROCEDURE — 6370000000 HC RX 637 (ALT 250 FOR IP): Performed by: NEUROLOGICAL SURGERY

## 2021-06-01 PROCEDURE — 6360000002 HC RX W HCPCS: Performed by: PHYSICIAN ASSISTANT

## 2021-06-01 PROCEDURE — 85027 COMPLETE CBC AUTOMATED: CPT

## 2021-06-01 PROCEDURE — 80048 BASIC METABOLIC PNL TOTAL CA: CPT

## 2021-06-01 PROCEDURE — 6370000000 HC RX 637 (ALT 250 FOR IP): Performed by: NURSE PRACTITIONER

## 2021-06-01 PROCEDURE — 87186 SC STD MICRODIL/AGAR DIL: CPT

## 2021-06-01 PROCEDURE — 3600000004 HC SURGERY LEVEL 4 BASE: Performed by: NEUROLOGICAL SURGERY

## 2021-06-01 PROCEDURE — 2720000010 HC SURG SUPPLY STERILE: Performed by: NEUROLOGICAL SURGERY

## 2021-06-01 PROCEDURE — 2500000003 HC RX 250 WO HCPCS: Performed by: NURSE ANESTHETIST, CERTIFIED REGISTERED

## 2021-06-01 PROCEDURE — 87205 SMEAR GRAM STAIN: CPT

## 2021-06-01 PROCEDURE — 72129 CT CHEST SPINE W/DYE: CPT

## 2021-06-01 PROCEDURE — 3600000014 HC SURGERY LEVEL 4 ADDTL 15MIN: Performed by: NEUROLOGICAL SURGERY

## 2021-06-01 PROCEDURE — APPSS15 APP SPLIT SHARED TIME 0-15 MINUTES: Performed by: NURSE PRACTITIONER

## 2021-06-01 PROCEDURE — 86850 RBC ANTIBODY SCREEN: CPT

## 2021-06-01 PROCEDURE — 87075 CULTR BACTERIA EXCEPT BLOOD: CPT

## 2021-06-01 PROCEDURE — 6360000004 HC RX CONTRAST MEDICATION: Performed by: NURSE PRACTITIONER

## 2021-06-01 PROCEDURE — 6360000002 HC RX W HCPCS: Performed by: NEUROLOGICAL SURGERY

## 2021-06-01 PROCEDURE — 3209999900 FLUORO FOR SURGICAL PROCEDURES

## 2021-06-01 PROCEDURE — 7100000001 HC PACU RECOVERY - ADDTL 15 MIN: Performed by: NEUROLOGICAL SURGERY

## 2021-06-01 PROCEDURE — 99222 1ST HOSP IP/OBS MODERATE 55: CPT | Performed by: NEUROLOGICAL SURGERY

## 2021-06-01 PROCEDURE — 85610 PROTHROMBIN TIME: CPT

## 2021-06-01 PROCEDURE — 2580000003 HC RX 258: Performed by: NURSE ANESTHETIST, CERTIFIED REGISTERED

## 2021-06-01 PROCEDURE — 00PU0MZ REMOVAL OF NEUROSTIMULATOR LEAD FROM SPINAL CANAL, OPEN APPROACH: ICD-10-PCS | Performed by: NEUROLOGICAL SURGERY

## 2021-06-01 PROCEDURE — 85730 THROMBOPLASTIN TIME PARTIAL: CPT

## 2021-06-01 PROCEDURE — 3700000001 HC ADD 15 MINUTES (ANESTHESIA): Performed by: NEUROLOGICAL SURGERY

## 2021-06-01 PROCEDURE — 36415 COLL VENOUS BLD VENIPUNCTURE: CPT

## 2021-06-01 PROCEDURE — 99222 1ST HOSP IP/OBS MODERATE 55: CPT | Performed by: INTERNAL MEDICINE

## 2021-06-01 PROCEDURE — 87147 CULTURE TYPE IMMUNOLOGIC: CPT

## 2021-06-01 PROCEDURE — 0JB70ZZ EXCISION OF BACK SUBCUTANEOUS TISSUE AND FASCIA, OPEN APPROACH: ICD-10-PCS | Performed by: NEUROLOGICAL SURGERY

## 2021-06-01 PROCEDURE — 7100000000 HC PACU RECOVERY - FIRST 15 MIN: Performed by: NEUROLOGICAL SURGERY

## 2021-06-01 PROCEDURE — 63688 REV/RMV IMP SP NPG/R DTCH CN: CPT | Performed by: NEUROLOGICAL SURGERY

## 2021-06-01 PROCEDURE — 63662 REMOVE SPINE ELTRD PLATE: CPT | Performed by: NEUROLOGICAL SURGERY

## 2021-06-01 RX ORDER — DEXTROSE MONOHYDRATE 25 G/50ML
12.5 INJECTION, SOLUTION INTRAVENOUS PRN
Status: DISCONTINUED | OUTPATIENT
Start: 2021-06-01 | End: 2021-06-05 | Stop reason: HOSPADM

## 2021-06-01 RX ORDER — NEOSTIGMINE METHYLSULFATE 5 MG/5 ML
SYRINGE (ML) INTRAVENOUS PRN
Status: DISCONTINUED | OUTPATIENT
Start: 2021-06-01 | End: 2021-06-01 | Stop reason: SDUPTHER

## 2021-06-01 RX ORDER — FENTANYL CITRATE 50 UG/ML
INJECTION, SOLUTION INTRAMUSCULAR; INTRAVENOUS PRN
Status: DISCONTINUED | OUTPATIENT
Start: 2021-06-01 | End: 2021-06-01 | Stop reason: SDUPTHER

## 2021-06-01 RX ORDER — LIDOCAINE HYDROCHLORIDE AND EPINEPHRINE 10; 10 MG/ML; UG/ML
INJECTION, SOLUTION INFILTRATION; PERINEURAL PRN
Status: DISCONTINUED | OUTPATIENT
Start: 2021-06-01 | End: 2021-06-01 | Stop reason: HOSPADM

## 2021-06-01 RX ORDER — DEXTROSE MONOHYDRATE 50 MG/ML
100 INJECTION, SOLUTION INTRAVENOUS PRN
Status: DISCONTINUED | OUTPATIENT
Start: 2021-06-01 | End: 2021-06-05 | Stop reason: HOSPADM

## 2021-06-01 RX ORDER — FENTANYL CITRATE 50 UG/ML
25 INJECTION, SOLUTION INTRAMUSCULAR; INTRAVENOUS EVERY 5 MIN PRN
Status: DISCONTINUED | OUTPATIENT
Start: 2021-06-01 | End: 2021-06-01 | Stop reason: HOSPADM

## 2021-06-01 RX ORDER — FENTANYL CITRATE 50 UG/ML
50 INJECTION, SOLUTION INTRAMUSCULAR; INTRAVENOUS EVERY 5 MIN PRN
Status: DISCONTINUED | OUTPATIENT
Start: 2021-06-01 | End: 2021-06-01 | Stop reason: HOSPADM

## 2021-06-01 RX ORDER — ONDANSETRON 2 MG/ML
4 INJECTION INTRAMUSCULAR; INTRAVENOUS
Status: DISCONTINUED | OUTPATIENT
Start: 2021-06-01 | End: 2021-06-01 | Stop reason: HOSPADM

## 2021-06-01 RX ORDER — VANCOMYCIN HYDROCHLORIDE 1 G/20ML
INJECTION, POWDER, LYOPHILIZED, FOR SOLUTION INTRAVENOUS PRN
Status: DISCONTINUED | OUTPATIENT
Start: 2021-06-01 | End: 2021-06-01 | Stop reason: HOSPADM

## 2021-06-01 RX ORDER — MEPERIDINE HYDROCHLORIDE 50 MG/ML
12.5 INJECTION INTRAMUSCULAR; INTRAVENOUS; SUBCUTANEOUS EVERY 5 MIN PRN
Status: DISCONTINUED | OUTPATIENT
Start: 2021-06-01 | End: 2021-06-01 | Stop reason: HOSPADM

## 2021-06-01 RX ORDER — ONDANSETRON 2 MG/ML
INJECTION INTRAMUSCULAR; INTRAVENOUS PRN
Status: DISCONTINUED | OUTPATIENT
Start: 2021-06-01 | End: 2021-06-01 | Stop reason: SDUPTHER

## 2021-06-01 RX ORDER — LIDOCAINE HYDROCHLORIDE 10 MG/ML
INJECTION, SOLUTION EPIDURAL; INFILTRATION; INTRACAUDAL; PERINEURAL PRN
Status: DISCONTINUED | OUTPATIENT
Start: 2021-06-01 | End: 2021-06-01 | Stop reason: SDUPTHER

## 2021-06-01 RX ORDER — PROPOFOL 10 MG/ML
INJECTION, EMULSION INTRAVENOUS PRN
Status: DISCONTINUED | OUTPATIENT
Start: 2021-06-01 | End: 2021-06-01 | Stop reason: SDUPTHER

## 2021-06-01 RX ORDER — NICOTINE POLACRILEX 4 MG
15 LOZENGE BUCCAL PRN
Status: DISCONTINUED | OUTPATIENT
Start: 2021-06-01 | End: 2021-06-05 | Stop reason: HOSPADM

## 2021-06-01 RX ORDER — DEXAMETHASONE SODIUM PHOSPHATE 10 MG/ML
INJECTION INTRAMUSCULAR; INTRAVENOUS PRN
Status: DISCONTINUED | OUTPATIENT
Start: 2021-06-01 | End: 2021-06-01 | Stop reason: SDUPTHER

## 2021-06-01 RX ORDER — DEXTROSE MONOHYDRATE 25 G/50ML
12.5 INJECTION, SOLUTION INTRAVENOUS ONCE
Status: COMPLETED | OUTPATIENT
Start: 2021-06-01 | End: 2021-06-01

## 2021-06-01 RX ORDER — GLYCOPYRROLATE 1 MG/5 ML
SYRINGE (ML) INTRAVENOUS PRN
Status: DISCONTINUED | OUTPATIENT
Start: 2021-06-01 | End: 2021-06-01 | Stop reason: SDUPTHER

## 2021-06-01 RX ORDER — SODIUM CHLORIDE, SODIUM LACTATE, POTASSIUM CHLORIDE, CALCIUM CHLORIDE 600; 310; 30; 20 MG/100ML; MG/100ML; MG/100ML; MG/100ML
INJECTION, SOLUTION INTRAVENOUS CONTINUOUS PRN
Status: DISCONTINUED | OUTPATIENT
Start: 2021-06-01 | End: 2021-06-01 | Stop reason: SDUPTHER

## 2021-06-01 RX ORDER — ROCURONIUM BROMIDE 10 MG/ML
INJECTION, SOLUTION INTRAVENOUS PRN
Status: DISCONTINUED | OUTPATIENT
Start: 2021-06-01 | End: 2021-06-01 | Stop reason: SDUPTHER

## 2021-06-01 RX ADMIN — ONDANSETRON 4 MG: 2 INJECTION INTRAMUSCULAR; INTRAVENOUS at 18:56

## 2021-06-01 RX ADMIN — SERTRALINE 100 MG: 50 TABLET, FILM COATED ORAL at 09:20

## 2021-06-01 RX ADMIN — PIPERACILLIN AND TAZOBACTAM 3375 MG: 3; .375 INJECTION, POWDER, LYOPHILIZED, FOR SOLUTION INTRAVENOUS at 23:24

## 2021-06-01 RX ADMIN — ROCURONIUM BROMIDE 50 MG: 10 INJECTION INTRAVENOUS at 16:49

## 2021-06-01 RX ADMIN — DEXAMETHASONE SODIUM PHOSPHATE 10 MG: 10 INJECTION INTRAMUSCULAR; INTRAVENOUS at 17:11

## 2021-06-01 RX ADMIN — SODIUM CHLORIDE: 9 INJECTION, SOLUTION INTRAVENOUS at 16:41

## 2021-06-01 RX ADMIN — PROPOFOL 150 MG: 10 INJECTION, EMULSION INTRAVENOUS at 16:49

## 2021-06-01 RX ADMIN — GABAPENTIN 400 MG: 400 CAPSULE ORAL at 13:58

## 2021-06-01 RX ADMIN — OXYCODONE HYDROCHLORIDE AND ACETAMINOPHEN 1 TABLET: 5; 325 TABLET ORAL at 23:25

## 2021-06-01 RX ADMIN — PHENYLEPHRINE HYDROCHLORIDE 50 MCG: 10 INJECTION INTRAVENOUS at 17:06

## 2021-06-01 RX ADMIN — LIDOCAINE HYDROCHLORIDE 50 MG: 10 INJECTION, SOLUTION EPIDURAL; INFILTRATION; INTRACAUDAL; PERINEURAL at 16:49

## 2021-06-01 RX ADMIN — METOCLOPRAMIDE 5 MG: 5 TABLET ORAL at 09:20

## 2021-06-01 RX ADMIN — GABAPENTIN 400 MG: 400 CAPSULE ORAL at 09:20

## 2021-06-01 RX ADMIN — Medication 0.9 MG: at 19:04

## 2021-06-01 RX ADMIN — IOPAMIDOL 75 ML: 755 INJECTION, SOLUTION INTRAVENOUS at 08:30

## 2021-06-01 RX ADMIN — PIPERACILLIN AND TAZOBACTAM 3375 MG: 3; .375 INJECTION, POWDER, LYOPHILIZED, FOR SOLUTION INTRAVENOUS at 06:01

## 2021-06-01 RX ADMIN — SODIUM CHLORIDE, POTASSIUM CHLORIDE, SODIUM LACTATE AND CALCIUM CHLORIDE: 600; 310; 30; 20 INJECTION, SOLUTION INTRAVENOUS at 16:53

## 2021-06-01 RX ADMIN — PHENYLEPHRINE HYDROCHLORIDE 50 MCG: 10 INJECTION INTRAVENOUS at 18:18

## 2021-06-01 RX ADMIN — PHENYLEPHRINE HYDROCHLORIDE 50 MCG: 10 INJECTION INTRAVENOUS at 18:15

## 2021-06-01 RX ADMIN — FENTANYL CITRATE 100 MCG: 50 INJECTION, SOLUTION INTRAMUSCULAR; INTRAVENOUS at 16:49

## 2021-06-01 RX ADMIN — FENTANYL CITRATE 50 MCG: 50 INJECTION, SOLUTION INTRAMUSCULAR; INTRAVENOUS at 20:10

## 2021-06-01 RX ADMIN — ISOSORBIDE MONONITRATE 60 MG: 60 TABLET ORAL at 09:20

## 2021-06-01 RX ADMIN — PIPERACILLIN AND TAZOBACTAM 3375 MG: 3; .375 INJECTION, POWDER, LYOPHILIZED, FOR SOLUTION INTRAVENOUS at 13:58

## 2021-06-01 RX ADMIN — FENTANYL CITRATE 50 MCG: 50 INJECTION, SOLUTION INTRAMUSCULAR; INTRAVENOUS at 20:04

## 2021-06-01 RX ADMIN — Medication 5 MG: at 19:04

## 2021-06-01 RX ADMIN — SODIUM CHLORIDE 25 ML: 9 INJECTION, SOLUTION INTRAVENOUS at 23:24

## 2021-06-01 RX ADMIN — METOPROLOL TARTRATE 25 MG: 25 TABLET ORAL at 09:20

## 2021-06-01 RX ADMIN — DEXTROSE MONOHYDRATE 12.5 G: 25 INJECTION, SOLUTION INTRAVENOUS at 16:18

## 2021-06-01 RX ADMIN — CEFAZOLIN 2000 MG: 10 INJECTION, POWDER, FOR SOLUTION INTRAVENOUS at 17:10

## 2021-06-01 RX ADMIN — FENTANYL CITRATE 50 MCG: 50 INJECTION, SOLUTION INTRAMUSCULAR; INTRAVENOUS at 19:06

## 2021-06-01 ASSESSMENT — PULMONARY FUNCTION TESTS
PIF_VALUE: 17
PIF_VALUE: 16
PIF_VALUE: 18
PIF_VALUE: 4
PIF_VALUE: 18
PIF_VALUE: 21
PIF_VALUE: 14
PIF_VALUE: 17
PIF_VALUE: 17
PIF_VALUE: 1
PIF_VALUE: 1
PIF_VALUE: 17
PIF_VALUE: 15
PIF_VALUE: 0
PIF_VALUE: 17
PIF_VALUE: 17
PIF_VALUE: 16
PIF_VALUE: 17
PIF_VALUE: 15
PIF_VALUE: 0
PIF_VALUE: 18
PIF_VALUE: 17
PIF_VALUE: 15
PIF_VALUE: 18
PIF_VALUE: 14
PIF_VALUE: 14
PIF_VALUE: 17
PIF_VALUE: 18
PIF_VALUE: 17
PIF_VALUE: 17
PIF_VALUE: 15
PIF_VALUE: 17
PIF_VALUE: 18
PIF_VALUE: 15
PIF_VALUE: 17
PIF_VALUE: 1
PIF_VALUE: 17
PIF_VALUE: 15
PIF_VALUE: 1
PIF_VALUE: 17
PIF_VALUE: 15
PIF_VALUE: 0
PIF_VALUE: 18
PIF_VALUE: 1
PIF_VALUE: 17
PIF_VALUE: 1
PIF_VALUE: 17
PIF_VALUE: 18
PIF_VALUE: 17
PIF_VALUE: 18
PIF_VALUE: 16
PIF_VALUE: 1
PIF_VALUE: 16
PIF_VALUE: 17
PIF_VALUE: 18
PIF_VALUE: 19
PIF_VALUE: 15
PIF_VALUE: 20
PIF_VALUE: 17
PIF_VALUE: 17
PIF_VALUE: 18
PIF_VALUE: 0
PIF_VALUE: 17
PIF_VALUE: 17
PIF_VALUE: 20
PIF_VALUE: 16
PIF_VALUE: 15
PIF_VALUE: 15
PIF_VALUE: 17
PIF_VALUE: 15
PIF_VALUE: 13
PIF_VALUE: 17
PIF_VALUE: 20
PIF_VALUE: 17
PIF_VALUE: 13
PIF_VALUE: 16
PIF_VALUE: 17
PIF_VALUE: 16
PIF_VALUE: 17
PIF_VALUE: 1
PIF_VALUE: 16
PIF_VALUE: 4
PIF_VALUE: 15
PIF_VALUE: 12
PIF_VALUE: 15
PIF_VALUE: 17
PIF_VALUE: 17
PIF_VALUE: 18
PIF_VALUE: 16
PIF_VALUE: 17
PIF_VALUE: 5
PIF_VALUE: 14
PIF_VALUE: 18
PIF_VALUE: 16
PIF_VALUE: 17
PIF_VALUE: 1
PIF_VALUE: 17
PIF_VALUE: 17
PIF_VALUE: 18
PIF_VALUE: 17
PIF_VALUE: 15
PIF_VALUE: 14
PIF_VALUE: 15
PIF_VALUE: 5
PIF_VALUE: 3
PIF_VALUE: 17
PIF_VALUE: 3
PIF_VALUE: 7
PIF_VALUE: 1
PIF_VALUE: 16
PIF_VALUE: 17
PIF_VALUE: 15
PIF_VALUE: 16
PIF_VALUE: 16
PIF_VALUE: 17
PIF_VALUE: 16
PIF_VALUE: 17
PIF_VALUE: 18
PIF_VALUE: 13
PIF_VALUE: 14
PIF_VALUE: 16
PIF_VALUE: 13
PIF_VALUE: 17
PIF_VALUE: 16
PIF_VALUE: 17
PIF_VALUE: 18
PIF_VALUE: 18
PIF_VALUE: 17
PIF_VALUE: 17
PIF_VALUE: 18
PIF_VALUE: 17
PIF_VALUE: 17
PIF_VALUE: 18
PIF_VALUE: 15
PIF_VALUE: 18
PIF_VALUE: 17
PIF_VALUE: 1
PIF_VALUE: 17
PIF_VALUE: 15
PIF_VALUE: 5

## 2021-06-01 ASSESSMENT — ENCOUNTER SYMPTOMS
ABDOMINAL DISTENTION: 0
EYE REDNESS: 0
WHEEZING: 0
DIARRHEA: 0
ABDOMINAL PAIN: 0
COLOR CHANGE: 1
SHORTNESS OF BREATH: 0
BACK PAIN: 1
CONSTIPATION: 0
VOMITING: 0
STRIDOR: 0
SORE THROAT: 0
EYE DISCHARGE: 0
COUGH: 0
NAUSEA: 0
BLOOD IN STOOL: 0

## 2021-06-01 ASSESSMENT — PAIN DESCRIPTION - PAIN TYPE
TYPE: SURGICAL PAIN
TYPE: SURGICAL PAIN

## 2021-06-01 ASSESSMENT — PAIN DESCRIPTION - LOCATION
LOCATION: FLANK
LOCATION: FLANK

## 2021-06-01 ASSESSMENT — PAIN SCALES - GENERAL
PAINLEVEL_OUTOF10: 10
PAINLEVEL_OUTOF10: 0
PAINLEVEL_OUTOF10: 6
PAINLEVEL_OUTOF10: 7
PAINLEVEL_OUTOF10: 8

## 2021-06-01 ASSESSMENT — PAIN - FUNCTIONAL ASSESSMENT: PAIN_FUNCTIONAL_ASSESSMENT: 0-10

## 2021-06-01 ASSESSMENT — PAIN DESCRIPTION - ORIENTATION
ORIENTATION: LEFT
ORIENTATION: LEFT

## 2021-06-01 ASSESSMENT — PAIN DESCRIPTION - DESCRIPTORS: DESCRIPTORS: STABBING

## 2021-06-01 NOTE — ANESTHESIA PRE PROCEDURE
Department of Anesthesiology  Preprocedure Note       Name:  Marco A Rose   Age:  72 y.o.  :  1955                                          MRN:  [id number]         Date:  2021      Surgeon: Roscoe Goodman):  Therese Prater,     Procedure: Procedure(s):  EXPLANT SPINAL CORD STIMULATOR, JORGE TABLE, WOUND VAC, PRONE    Medications prior to admission:   Prior to Admission medications    Medication Sig Start Date End Date Taking? Authorizing Provider   pantoprazole (PROTONIX) 40 MG tablet Take 1 tablet by mouth every morning (before breakfast) 2/15/21   LUCIE Bell NP   Calcium Carb-Cholecalciferol (CALCIUM/VITAMIN D) 600-400 MG-UNIT TABS Take one tab po bid 20   Callie Mccartney MD   furosemide (LASIX) 20 MG tablet take 1 tablet by mouth two times a week if needed for water retention 20   LUCIE Arce CNP   sertraline (ZOLOFT) 100 MG tablet take 1 tablet by mouth once daily 20   Nancy Barriga MD   losartan-hydroCHLOROthiazide [facility]) 50-12.5 MG per tablet take 1 tablet by mouth once daily 20   LUCIE Arce CNP   Continuous Blood Gluc Sensor (FREESTYLE SWETHA 14 DAY SENSOR) [facility] APPLY 1 SENSOR TO THE BACK OF ARM. REMOVE AND REPLACE EVERY 14 DAYS.  USE WITH DEVICE TO MONITOR BLOOD SUGAR 20   Nancy Barriga MD   atorvastatin (LIPITOR) 40 MG tablet take 1 tablet by mouth once daily 20   LUCIE Arce CNP   metoclopramide (REGLAN) 5 MG tablet take 1 tablet by mouth twice a day 20   Nancy Barirga MD   insulin aspart (NOVOLOG) 100 UNIT/ML injection vial INJECT 60 UNITS VIA PUMP ONCE DAILY 2/3/20   Nancy Barriga MD   levothyroxine (SYNTHROID) 88 MCG tablet take 1 tablet by mouth once daily 20   Nancy Barriga MD   isosorbide mononitrate (IMDUR) 60 MG extended release tablet take 1 tablet by mouth once daily 19   Callie Mccartney MD   metoprolol tartrate (LOPRESSOR) 25 MG tablet take 1 tablet by mouth  potassium chloride 10 mEq/100 mL IVPB (Peripheral Line)  10 mEq Intravenous PRN Josselyn Crow PA-C        magnesium sulfate 1000 mg in dextrose 5% 100 mL IVPB  1,000 mg Intravenous PRN Gonzales Law PA-C        [Held by provider] enoxaparin (LOVENOX) injection 40 mg  40 mg Subcutaneous Daily Josselyn Crow PA-C        promethazine (PHENERGAN) tablet 12.5 mg  12.5 mg Oral Q6H PRN Josselyn Crow PA-C        Or    [facility]dansetron [facility] injection 4 mg  4 mg Intravenous Q6H PRN Josselyn Crow PA-C        polyethylene glycol (GLYCOLAX) packet 17 g  17 g Oral Daily PRN Gonzales Law PA-C        nicotine (NICODERM CQ) 21 MG/24HR 1 patch  1 patch Transdermal Daily PRN Gonzales Law PA-C        acetaminophen (TYLENOL) tablet 650 mg  650 mg Oral Q6H PRN Josselyn Crow PA-C        Or    acetaminophen (TYLENOL) suppository 650 mg  650 mg Rectal Q6H PRN Josselyn Crow PA-C        piperacillin-tazobactam (ZOSYN) 3,375 mg in dextrose 5 % 50 mL IVPB extended infusion (mini-bag)  3,375 mg Intravenous Q8H Josselyn Crow PA-C 12.5 mL/hr at 06/01/21 1358 3,375 mg at 06/01/21 1358    vancomycin (VANCOCIN) intermittent dosing (placeholder)   Other RX Placeholder Josselyn Crow PA-C        vancomycin (VANCOCIN) 1250 mg in dextrose 5 % 250 mL IVPB  1,250 mg Intravenous Q24H Josselyn Crow PA-C        atorvastatin (LIPITOR) tablet 40 mg  40 mg Oral Nightly William Bacca, APRN - CNP   40 mg at 05/31/21 2332    gabapentin (NEURONTIN) capsule 400 mg  400 mg Oral TID William Bacca, APRN - CNP   400 mg at 06/01/21 1358    isosorbide mononitrate (IMDUR) extended release tablet 60 mg  60 mg Oral Daily William Bacca, APRN - CNP   60 mg at 06/01/21 0920    metoprolol tartrate (LOPRESSOR) tablet 25 mg  25 mg Oral BID LUCIE Theodore - CNP   25 mg at 06/01/21 0920    pantoprazole (PROTONIX) tablet 40 mg  40 [facility] William Alcazar APR[facility] - CNP        sertraline (ZOLOFT) tablet 100 mg  100 mg Oral Daily Georga Libman, APRN - CNP   100 mg at 06/01/21 0920    metoclopramide (REGLAN) tablet 5 mg  5 mg Oral BID Georga Libman, APRN - CNP   5 mg at 06/01/21 0920    morphine (PF) injection 2 mg  2 mg Intravenous Q4H PRN Georga Libman, APRN - CNP        insulin lispro (HUMALOG) injection vial 0-6 Units  0-6 Units Subcutaneous TID WC Georga Libman, APRN - CNP        insulin lispro (HUMALOG) injection vial 0-3 Units  0-3 Units Subcutaneous Nightly Georga Libman, APRN - CNP        glucose (GLUTOSE) 40 % oral gel 15 g  15 g Oral PRN Georga Libman, APRN - CNP        dextrose 50 % IV solution  12.5 g Intravenous PRN Georga Libman, APRN - CNP        glucagon (rDNA) injection 1 mg  1 mg Intramuscular PRN Georga Libman, APRN - CNP        dextrose 5 % solution  100 mL/hr Intravenous PRN Georga Libman, APRN - CNP        oxyCODONE-acetaminophen (PERCOCET) 5-325 MG per tablet 1 tablet  1 tablet Oral Q4H PRN Ashley Barksdale MD   1 tablet at 05/31/21 2332       Allergies:     Allergies   Allergen Reactions    Actos [Pioglitazone] Other (See Comments)     Weight gain    Lisinopril Other (See Comments)     cough    Metformin And Related Diarrhea    Zithromax [Azithromycin] Itching       Problem List:    Patient Active Problem List   Diagnosis Code    Degeneration of lumbar or lumbosacral intervertebral disc M51.37    Postlaminectomy syndrome, lumbar region M96.1    Osteoarthritis M19.90    CAD (coronary artery disease) I25.10    Restless legs syndrome G25.81    Sleep apnea G47.30    Chest pressure R07.89    Gastroesophageal reflux disease without esophagitis K21.9    Bloated abdomen R14.0    Diabetes ([address] E11.9    Insulin pump in place Z96.41    Headache R51.9    Pure hypercholesterolemia E78.00    Diabetic polyneuropathy (HCC) E11.42    Ulcer of lower extremity ([address] L97.909    Essential hypertension I10    Mixed hyperlipidemia E78.2    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 BBB    GERD (gastroesophageal reflux disease)     Headache     Hyperlipidemia     Hypertension     Hypothyroidism     Insulin pump in place     Neuropathy     Osteoarthritis     Peripheral vascular disease (HCC)     Pure hypercholesterolemia     Restless legs syndrome     Sleep apnea     Ulcer of lower extremity ([address]     Wears glasses        Past Surgical History:        Procedure Laterality Date    BACK SURGERY      cage in place at L5-S1., SPINAL CORD STIMULATOR    CARPAL TUNNEL RELEASE Bilateral     CATARACT REMOVAL WITH IMPLANT Right 05/16/2017    CATARACT REMOVAL WITH IMPLANT Left 06/13/2017    DR SHARON HERRERA    CHOLECYSTECTOMY      COLONOSCOPY  07/13/2004    normal    COLONOSCOPY  09/14/2016    no lesions seen, spasms sigmoid colon    COLONOSCOPY  01/14/2021    DR KARLEY MARTINS-    CYST REMOVAL Right     GANGLION CYST REMOVED.     ENDOSCOPY, COLON, DIAGNOSTIC      EGD    JOINT REPLACEMENT Left     knee    OTHER SURGICAL HISTORY  11/21/2016    Revision of spinal cord stimulator    SPINAL CORD STIMULATOR SURGERY N/A 4/16/2021    SPINAL CORD STIMULATOR  BATTERY REPLACEMENT - ABBOTT  -   dr reinaldo huang please performed by Senait Heath MD at [address] [place] [address]  08/16/2016    NO LESIONS SEEN    UPPER GASTROINTESTINAL ENDOSCOPY  01/14/2021    ESOPHAGEAL POLYP - DR Salvador Lombard       Social History:    Social History     Tobacco Use    Smoking status: Never Smoker    Smokeless tobacco: Never Used   Substance Use Topics    Alcohol use: No                                Counseling given: Not Answered      Vital Signs (Current):   Vitals:    05/31/21 2130 06/01/21 0916   BP: (!) 125/102 (!) 143/52   Pulse: 88 71   Resp: 18 19   Temp: 99.6 °F (37.6 °C) 98.2 °F (36.8 °C)   TempSrc: Oral Oral   SpO2: 97% 99%   Weight: 203 lb 11.2 oz (92.4 kg)    Height: 5' 2\" (1.575 m)                                               BP Readings from Last 3 Encounters:   06/01/21 (!) 143/52   05/31/21 (!) 183/155   04/16/21 136/64       NPO Status:                                                                                 BMI:   Wt Readings from Last 3 Encounters:   05/31/21 203 lb 11.2 oz (92.4 kg)   05/31/21 183 lb (83 kg)   04/16/21 183 lb (83 kg)     Body mass index is 37.26 kg/m².     CBC:   Lab Results   Component Value Date    WBC 9.5 06/01/2021    RBC 3.46 06/01/2021    RBC 4.34 10/13/2011    HGB 10.1 06/01/2021    HCT 29.5 06/01/2021    MCV 85.3 06/01/2021    RDW 12.8 06/01/2021     06/01/2021     10/13/2011       CMP:   Lab Results   Component Value Date     06/01/2021    K 3.7 06/01/2021    CL 99 06/01/2021    CO2 24 06/01/2021    BUN 14 06/01/2021    CREATININE 0.77 06/01/2021    GFRAA >60 06/01/2021    LABGLOM >60 06/01/2021    GLUCOSE 196 06/01/2021    GLUCOSE 310 10/13/2011    PROT 7.0 05/31/2021    CALCIUM 8.6 06/01/2021    BILITOT 0.63 05/31/2021    ALKPHOS 83 05/31/2021    AST 18 05/31/2021    ALT 19 05/31/2021       POC Tests:   Recent Labs     06/01/21  0916   POCGLU 158*       Coags:   Lab Results   Component Value Date    PROTIME 10.0 06/01/2021    INR 0.9 06/01/2021    APTT 22.1 06/01/2021       HCG (If Applicable): No results found for: PREGTESTUR, PREGSERUM, HCG, HCGQUANT     ABGs: No results found for: PHART, PO2ART, RJX4CWI, DOR5DOP, BEART, R1AXFTZY     Type & Screen (If Applicable):  No results found for: LABABO, LABRH    Drug/Infectious Status (If Applicable): No results found for: HIV, HEPCAB    COVID-19 Screening (If Applicable):   Lab Results   Component Value Date    COVID19 Not Detected 06/01/2021           Anesthesia Evaluation    Airway: Mallampati: III  TM distance: >3 FB   Neck ROM: full  Mouth opening: > = 3 FB Dental:    (+) other      Pulmonary:normal exam    (+) sleep apnea:                             Cardiovascular:    (+) hypertension:, CAD:,                   Neuro/Psych:   (+) neuromuscular disease:, headaches:, psychiatric history:depression/anxiety             GI/Hepatic/Renal:   (+) GERD:,           Endo/Other:    (+) DiabetesType I DM, poorly controlled, using insulin, hypothyroidism::., .                 Abdominal:   (+) obese,         Vascular:                                      Anesthesia Plan      general     ASA 3       Induction: intravenous. MIPS: Postoperative opioids intended. Anesthetic plan and risks discussed with patient. Plan discussed with CRNA.                   Dillon Holley MD   6/1/2021

## 2021-06-01 NOTE — OP NOTE
Operative Note      Patient: Angelic Gan  YOB: 1955  MRN: [id number]    Date of Procedure: 6/1/2021    Pre-Op Diagnosis: INFECTED SPINAL CORD STIMULATOR    Post-Op Diagnosis: Same       Indications for surgery. I received a call from the transferring hospital at [facility] with the patient arrived in the ER with gross purulence erythema and fluctuance over the battery site wound. On arrival the patient was with leukocytosis low-grade febrile fever and very obviously infected wound site. Patient was consented for explantation of the wound after a CT abdomen pelvis was performed. CT showed f some abnormal enhancement over the lead entry site as well. Procedure  Laminotomy for explantation of paddle spinal cord stimulator  Explantation of stimulator generator  Use of fluoroscopy  I&D of thoracic and left flank wounds -excisional and nonexcisional.  Complex wound closure    Surgeon(s):  Cinthia Lugo DO    Assistant:   First Assistant: Antony Gonzalez; Vivek Levine RN    Anesthesia: General    Estimated Blood Loss (mL): 391     Complications: None    Specimens:   ID Type Source Tests Collected by Time Destination   1 : THORACIC INCISION Swab Back CULTURE, ANAEROBIC AND AEROBIC Bindu Ahammad, DO 6/1/2021 1732    2 : DEEP THORACIC Swab Back CULTURE, ANAEROBIC AND AEROBIC Bindu Ahammad, DO 6/1/2021 1742    3 : STIMULATOR LEAD Hardware Hardware CULTURE, ANAEROBIC AND AEROBIC Bindu Ahammad, DO 6/1/2021 1748    4 : GENERATOR POCKET Swab Back CULTURE, ANAEROBIC AND AEROBIC Bindu Ahammad, DO 6/1/2021 1756    5 : GENERATOR Hardware Hardware CULTURE, ANAEROBIC AND AEROBIC Bindu Ahammad, DO 6/1/2021 1759        Implants:  * No implants in log *      Drains:   Closed/Suction Drain Superior;Midline Back Bulb 7 Ivorian (Active)       Closed/Suction Drain Inferior Back Bulb 7 Ivorian (Active)       Findings: Gross purulence at the lead site as well as the flank generator site.     Detailed Description of Procedure:   Patient was consented preoperatively brought into the operating room and placed under general anesthesia. She was flipped prone onto the Inell Means table all pressure points padded arms placed on the arm boards. Midline left leg incisions were marked out. After sterile prepping draping timeout was performed I infiltrated the thoracic incision with onset lidocaine with epinephrine. 10 blade is used to perform the incision followed by Bovie to take it all the way down until the leads were visible. At this point I traced the leads to the distal portion as they were tunneling towards the generator site and obtained clear-cut gross purulence. This was cultured and sent off for anaerobic and aerobic. I then continue to follow the leads deep. Identify the inferior margin of the spinous process that was remaining. Rongeur was used to remove this followed by drill to drill down the lamina. Upgoing curette was used to dissect the dorsal epidural space. 3 punch was used to resect any remaining flavum. Electrode wires identified both were grabbed using a tonsil and gently traction was used to pull. The lead did come out in 1 piece but I did no gross purulence surrounding the lead. Lead was sent off for culture including anaerobic and aerobic. Wound was thoroughly irrigated. Leads were cut just past the anchor and the anchor lead and wires were sent off for culture. Wound was thoroughly irrigated with Betadine as well as saline. I did use a small red rubber catheter to track into the epidural space caudad in order to irrigate out any potential epidural abscess that may be present. No obvious purulence was noted. Irrigation was continued. After wound was thoroughly irrigated I then placed a 7 flat JANETH drain and tunneled subfascial and secured with a drain stitch.   Wound was closed multiple 0 Vicryl's for the fascia followed by running 2-0 Vicryl for subcutaneous tissue and vertical mattress 3-0 nylon for the skin. I then turned my attention to the battery site. This was opened with a 10 blade at which point brownish-yellow gross voluminous purulence was obtained and cultured. The generator was removed along with the wire and sent off for culture as well. AP and lateral x-rays show that there was no residual hardware evident. Thorough irrigation of the battery site with both Betadine and saline and Aricept was performed. I then performed an excisional and nonexcisional debridement of the subcutaneous tissue skin using both 10 x 15 blade and Metzenbaums. Thorough debridement of all nonviable necrotic tissue and fat necrosis was performed. Bleeding edges circumferentially of the neck were obtained. I attempted to use the red rubber catheter to identify the tract that the previous leads were following irrigated with Betadine followed by saline. This was unsuccessful unfortunately. After thorough debridement no active purulence was noted. A 7 flat JANETH drain was again tunneled within the battery pocket. Multiple 0 Vicryl's were used to close off the dead space within the generator pocket followed by 2-0 Vicryl for the subcutaneous tissue and again 3-0 nylon vertical mattress interrupteds for the skin. Both wounds were dressed with Prevena wound vacs.     Electronically signed by Oleh Lennox, DO on 6/1/2021 at 7:05 PM

## 2021-06-01 NOTE — H&P
[facility]  Office: [address], DO, Ana Marleneadelaida, DO, Willie Grimesearnest, DO, Lynette Eye Blood, DO, Santosh Hernandez MD, Tereza Henson MD, Kade Colorado MD, Kayla Edwards MD, Gris Aguiar MD, Roya Waters MD, Osito Ssoa MD, Remington Saenz MD, Mary Razo, DO, Jazmin Lackey MD, Aissatou Estrella DO, Murlean Gowers, MD,  Maryellen Oh DO, Chun Joseph MD, Aggie Covarrubias MD, Patrick Valle MD, Shree Poole MD, Brian Mcdonald [facility], [facility], CNP, Alexis Kaba, CNP, Agustin Norris, CNS, Jad pSrague, CNP, Elva Ramirez, CNP, Luis Linder, CNP, Dileep Johnson, CNP, Selena Trevizo, CNP, Jimmy Boateng PA-C, Courtney Del Cid, [facility], Ede Martino, CNP, Hetal Thorpe, CNP, [place], CNP, Miguel Guerrero, CNP, Emory Langley, CNP, Enedelia Dumont, [address]    HISTORY AND PHYSICAL EXAMINATION            Date:   5/31/2021  Patient name:  Anabel Barriga  Date of admission:  5/31/2021  9:16 PM  MRN:   [id number]  Account:  [de-identified]  YOB: 1955  PCP:    Ta De Dios MD  Room:   9057/0163-12  Code Status:    Full Code    Chief Complaint:   Wound infection    History Obtained From:     patient, electronic medical record    History of Present Illness:     Anabel Barriga is a 72 y.o. Non-/non  female who presents with wound infection and is admitted to the hospital for the management of infected implant/stimulator. Patient presents to [facility] emergency room with a concern of infection at the site of her spinal cord stimulator. Patient states that she had her spinal cord stimulator battery replaced 4/16/21 and the site of the surgery is now infected.   Patient states that she started to develop redness and swelling at the site and then was seen in the [facility] emergency room and was sent to [facility] for further evaluation by the neurosurgery service. Work up in the emergency room           Laboratories:   Metabolic panel. -Sodium 817, potassium 3.8, chloride 94, CO2 24, BUN 19 creatinine 0.9, glucose 367, beta hydroxybutyrate normal  GI/Liver Panel-within normal limits   hematology.-No acute leukocytosis, hemoglobin 10.9, hematocrit 32.4, sag neutrophils 74, lymphocytes 14  Coagulation-sed rate 33, .2  Cardiac Markers-not done  EKG-not done  Urine-not done      Imaging and Diagnostics:     None        Past Medical History:     Past Medical History:   Diagnosis Date    CAD (coronary artery disease)     \"40 % blockage lower heart\"    Cataracts, bilateral     Chest pain     pressure    Chronic back pain     Depression     Diabetes ([address]     Diabetic polyneuropathy ([address]     EKG abnormalities 11/14/2016    RT. BBB    GERD (gastroesophageal reflux disease)     Headache     Hyperlipidemia     Hypertension     Hypothyroidism     Insulin pump in place     Neuropathy     Osteoarthritis     Peripheral vascular disease (HCC)     Pure hypercholesterolemia     Restless legs syndrome     Sleep apnea     Ulcer of lower extremity ([address]     Wears glasses         Past Surgical History:     Past Surgical History:   Procedure Laterality Date    BACK SURGERY      cage in place at L5-S1., SPINAL CORD STIMULATOR    CARPAL TUNNEL RELEASE Bilateral     CATARACT REMOVAL WITH IMPLANT Right 05/16/2017    CATARACT REMOVAL WITH IMPLANT Left 06/13/2017    DR SHARON HERRERA    CHOLECYSTECTOMY      COLONOSCOPY  07/13/2004    normal    COLONOSCOPY  09/14/2016    no lesions seen, spasms sigmoid colon    COLONOSCOPY  01/14/2021    DR KARLEY MARTINS-    CYST REMOVAL Right     GANGLION CYST REMOVED.     ENDOSCOPY, COLON, DIAGNOSTIC      EGD    JOINT REPLACEMENT Left     knee    OTHER SURGICAL HISTORY  11/21/2016    Revision of spinal cord stimulator    SPINAL CORD STIMULATOR SURGERY N/A 4/16/2021    SPINAL CORD STIMULATOR  BATTERY REPLACEMENT - ABBOTT  -    requests closer please performed by Nicho Hoffman MD at [address] [facility] [address]  08/16/2016    NO LESIONS SEEN    UPPER GASTROINTESTINAL ENDOSCOPY  01/14/2021    ESOPHAGEAL POLYP - DR KARLEY MARTINS        Medications Prior to Admission:     Prior to Admission medications    Medication Sig Start Date End Date Taking? Authorizing Provider   pantoprazole (PROTONIX) 40 MG tablet Take 1 tablet by mouth every morning (before breakfast) 2/15/21   LUCIE Holman NP   Calcium Carb-Cholecalciferol (CALCIUM/VITAMIN D) 600-400 MG-UNIT TABS Take one tab po bid 11/16/20   Sarai Robert MD   furosemide (LASIX) 20 MG tablet take 1 tablet by mouth two times a week if needed for water retention 9/1/20   LUCIE Worthington CNP   sertraline (ZOLOFT) 100 MG tablet take 1 tablet by mouth once daily 7/27/20   Viola Saini MD   losartan-hydroCHLOROthiazide [facility]) 50-12.5 MG per tablet take 1 tablet by mouth once daily 6/23/20   LUCIE Worthington CNP   Continuous Blood Gluc Sensor ([employer]YLE SWETHA 14 DAY SENSOR) [facility] APPLY 1 SENSOR TO THE BACK OF ARM. REMOVE AND REPLACE EVERY 14 DAYS.  USE WITH DEVICE TO MONITOR BLOOD SUGAR 6/22/20   Viola Saini MD   atorvastatin (LIPITOR) 40 MG tablet take 1 tablet by mouth once daily 5/14/20   LUCIE Worthington CNP   metoclopramide (REGLAN) 5 MG tablet take 1 tablet by mouth twice a day 2/27/20   Viola Saini MD   insulin aspart (NOVOLOG) 100 UNIT/ML injection vial INJECT 60 UNITS VIA PUMP ONCE DAILY 2/3/20   Viola Saini MD   levothyroxine (SYNTHROID) 88 MCG tablet take 1 tablet by mouth once daily 1/27/20   Viola Saini MD   isosorbide mononitrate (IMDUR) 60 MG extended release tablet take 1 tablet by mouth once daily 7/29/19   Sarai Robert MD   metoprolol tartrate (LOPRESSOR) 25 MG tablet take 1 tablet by mouth twice a day 4/10/19   Sarai Robert MD Negative for dysuria and frequency. Musculoskeletal: Positive for back pain. Negative for myalgias. Skin: Positive for color change and wound. Negative for rash. Neurological: Negative for dizziness, seizures and headaches. Psychiatric/Behavioral: The patient is not nervous/anxious. Physical Exam:   BP (!) 125/102   Pulse 88   Temp 99.6 °F (37.6 °C) (Oral)   Resp 18   Ht 5' 2\" (1.575 m)   Wt 203 lb 11.2 oz (92.4 kg)   BMI 37.26 kg/m²   Temp (24hrs), Av.1 °F (37.3 °C), Min:98.6 °F (37 °C), Max:99.6 °F (37.6 °C)    No results for input(s): POCGLU in the last 72 hours. No intake or output data in the 24 hours ending [id number]    Physical Exam  Vitals and nursing note reviewed. Constitutional:       General: She is not in acute distress. Appearance: She is well-developed. She is not ill-appearing, toxic-appearing or diaphoretic. HENT:      Head: Normocephalic and atraumatic. Right Ear: Hearing normal.      Left Ear: Hearing normal.      Nose: Nose normal. No rhinorrhea. Eyes:      General: Lids are normal.      Extraocular Movements:      Right eye: Normal extraocular motion. Left eye: Normal extraocular motion. Conjunctiva/sclera: Conjunctivae normal.      Right eye: Right conjunctiva is not injected. Left eye: Left conjunctiva is not injected. Pupils: Pupils are equal, round, and reactive to light. Pupils are equal.      Right eye: Pupil is reactive. Left eye: Pupil is reactive. Neck:      Thyroid: No thyromegaly. Vascular: No carotid bruit. Trachea: Trachea and phonation normal. No tracheal deviation. Cardiovascular:      Rate and Rhythm: Normal rate and regular rhythm. Pulses: Normal pulses. Heart sounds: Normal heart sounds. No murmur heard. Pulmonary:      Effort: Pulmonary effort is normal. No respiratory distress. Breath sounds: Normal breath sounds. No stridor. No decreased breath sounds.    Abdominal: General: Bowel sounds are normal. There is no distension. Palpations: Abdomen is soft. There is no mass. Tenderness: There is no abdominal tenderness. There is no guarding. Musculoskeletal:         General: No tenderness. Cervical back: Neck supple. Comments: Generalized pain throughout spinal cord with tenderness to touch no gross crepitus   Skin:     General: Skin is warm and dry. Findings: No erythema, lesion or rash. Neurological:      Mental Status: She is alert and oriented to person, place, and time. She is not disoriented. GCS: GCS eye subscore is 4. GCS verbal subscore is 5. GCS motor subscore is 6. Cranial Nerves: No cranial nerve deficit. Sensory: No sensory deficit. Motor: No weakness. Psychiatric:         Speech: Speech normal.         Behavior: Behavior normal. Behavior is cooperative.          Investigations:      Laboratory Testing:  Recent Results (from the past 24 hour(s))   CBC Auto Differential    Collection Time: 05/31/21  3:00 PM   Result Value Ref Range    WBC 9.3 3.5 - 11.0 k/uL    RBC 3.76 (L) 4.0 - 5.2 m/uL    Hemoglobin 10.9 (L) 12.0 - 16.0 g/dL    Hematocrit 32.4 (L) 36 - 46 %    MCV 86.2 80 - 100 fL    MCH 28.9 26 - 34 pg    MCHC 33.5 31 - 37 g/dL    RDW 13.4 11.5 - 14.9 %    Platelets 188 641 - 054 k/uL    MPV 7.5 6.0 - 12.0 fL    NRBC Automated NOT REPORTED per 100 WBC    Differential Type NOT REPORTED     Seg Neutrophils 74 (H) 36 - 66 %    Lymphocytes 14 (L) 24 - 44 %    Monocytes 10 (H) 1 - 7 %    Eosinophils % 1 0 - 4 %    Basophils 1 0 - 2 %    Immature Granulocytes NOT REPORTED 0 %    Segs Absolute 6.90 1.3 - 9.1 k/uL    Absolute Lymph # 1.30 1.0 - 4.8 k/uL    Absolute Mono # 1.00 0.1 - 1.3 k/uL    Absolute Eos # 0.10 0.0 - 0.4 k/uL    Basophils Absolute 0.00 0.0 - 0.2 k/uL    Absolute Immature Granulocyte NOT REPORTED 0.00 - 0.30 k/uL    WBC Morphology NOT REPORTED     RBC Morphology NOT REPORTED     Platelet Estimate NOT REPORTED    Comprehensive Metabolic Panel    Collection Time: 05/31/21  3:00 PM   Result Value Ref Range    Glucose 367 (H) 70 - 99 mg/dL    BUN 19 8 - 23 mg/dL    CREATININE 0.90 0.50 - 0.90 mg/dL    Bun/Cre Ratio NOT REPORTED 9 - 20    Calcium 9.4 8.6 - 10.4 mg/dL    Sodium 131 (L) 135 - 144 mmol/L    Potassium 3.8 3.7 - 5.3 mmol/L    Chloride 94 (L) 98 - 107 mmol/L    CO2 24 20 - 31 mmol/L    Anion Gap 13 9 - 17 mmol/L    Alkaline Phosphatase 83 35 - 104 U/L    ALT 19 5 - 33 U/L    AST 18 <32 U/L    Total Bilirubin 0.63 0.3 - 1.2 mg/dL    Total Protein 7.0 6.4 - 8.3 g/dL    Albumin 3.8 3.5 - 5.2 g/dL    Albumin/Globulin Ratio NOT REPORTED 1.0 - 2.5    GFR Non-African American >60 >60 mL/min    GFR African American >60 >60 mL/min    GFR Comment          GFR Staging NOT REPORTED    Magnesium    Collection Time: 05/31/21  3:00 PM   Result Value Ref Range    Magnesium 2.1 1.6 - 2.6 mg/dL   C-Reactive Protein    Collection Time: 05/31/21  3:00 PM   Result Value Ref Range    .2 (H) 0.0 - 5.0 mg/L   Sedimentation Rate    Collection Time: 05/31/21  3:00 PM   Result Value Ref Range    Sed Rate 33 (H) 0 - 30 mm   Beta Hydroxybutyrate    Collection Time: 05/31/21  3:00 PM   Result Value Ref Range    Beta-Hydroxybutyrate 0.05 0.02 - 0.27 mmol/L   Lactate, Sepsis    Collection Time: 05/31/21  3:00 PM   Result Value Ref Range    Lactic Acid, Sepsis 2.6 (H) 0.5 - 1.9 mmol/L    Lactic Acid, Sepsis, Whole Blood NOT REPORTED 0.5 - 1.9 mmol/L   Lactate, Sepsis    Collection Time: 05/31/21  5:35 PM   Result Value Ref Range    Lactic Acid, Sepsis 1.3 0.5 - 1.9 mmol/L    Lactic Acid, Sepsis, Whole Blood NOT REPORTED 0.5 - 1.9 mmol/L       Imaging/Diagnostics:  No results found.     Assessment :      Hospital Problems         Last Modified POA    * (Principal) Infection of spinal cord stimulator ([address] 6/1/2021 Yes    Soft tissue infection 6/1/2021 Yes    Postlaminectomy syndrome, lumbar region 6/1/2021 Yes    CAD (coronary artery

## 2021-06-01 NOTE — CONSULTS
Department of Neurosurgery                                            Nurse Practitioner Consult Note      Reason for Consult:  Infection nerve stimulator   Requesting Physician:  Sarah Morrison  Neurosurgeon:   [x] Dr. Maday Kolb  [] Dr. Jesus Waters  [] Dr. Lori Osorio  [] Dr. Lucretia Ann      History Obtained From:  patient, electronic medical record    CHIEF COMPLAINT:         Infection of spinal cord stimulator     HISTORY OF PRESENT ILLNESS:       The patient is a 72 y.o. female who presents with wound infection. She was transferred from [facility] with concern of infection at the site of her spinal cord stimulator. She recently had battery replaced 4/16/2021 by Dr Jesús Talamantes. She has since developed redness and swelling at the site. She also has pain upon palpation to mid thoracic spine which she reports is new. She has been started on Vancomycin and Zosyn    Glucose 367    ESR 33  Set of blood cultures were drawn    PAST MEDICAL HISTORY :       Past Medical History:        Diagnosis Date    CAD (coronary artery disease)     \"40 % blockage lower heart\"    Cataracts, bilateral     Chest pain     pressure    Chronic back pain     Depression     Diabetes ([address]     Diabetic polyneuropathy ([address]     EKG abnormalities 11/14/2016    RT. BBB    GERD (gastroesophageal reflux disease)     Headache     Hyperlipidemia     Hypertension     Hypothyroidism     Insulin pump in place     Neuropathy     Osteoarthritis     Peripheral vascular disease (HCC)     Pure hypercholesterolemia     Restless legs syndrome     Sleep apnea     Ulcer of lower extremity ([address]     Wears glasses        Past Surgical History:        Procedure Laterality Date    BACK SURGERY      cage in place at L5-S1., SPINAL CORD STIMULATOR    CARPAL TUNNEL RELEASE Bilateral     CATARACT REMOVAL WITH IMPLANT Right 05/16/2017    CATARACT REMOVAL WITH IMPLANT Left 06/13/2017    DR SHARON HERRERA    CHOLECYSTECTOMY      COLONOSCOPY 07/13/2004    normal    COLONOSCOPY  09/14/2016    no lesions seen, spasms sigmoid colon    COLONOSCOPY  01/14/2021    DR KARLEY MARTINS-    CYST REMOVAL Right     GANGLION CYST REMOVED.  ENDOSCOPY, COLON, DIAGNOSTIC      EGD    JOINT REPLACEMENT Left     knee    OTHER SURGICAL HISTORY  11/21/2016    Revision of spinal cord stimulator    SPINAL CORD STIMULATOR SURGERY N/A 4/16/2021    SPINAL CORD STIMULATOR  BATTERY REPLACEMENT - ABBOTT  -   dr reinaldo huang please performed by Severino Coles MD at [address]  08/16/2016    NO LESIONS SEEN    UPPER GASTROINTESTINAL ENDOSCOPY  01/14/2021    ESOPHAGEAL POLYP - DR Prasanna Guevara       Social History:   Social History     Socioeconomic History    Marital status:      Spouse name: Not on file    Number of children: Not on file    Years of education: Not on file    Highest education level: Not on file   Occupational History    Not on file   Tobacco Use    Smoking status: Never Smoker    Smokeless tobacco: Never Used   Vaping Use    Vaping Use: Never used   Substance and Sexual Activity    Alcohol use: No    Drug use: No    Sexual activity: Not on file   Other Topics Concern    Not on file   Social History Narrative    Not on file     Social Determinants of Health     Financial Resource Strain: Low Risk     Difficulty of Paying Living Expenses: Not hard at all   Food Insecurity: No Food Insecurity    Worried About [facility] in the Last Year: Never true    [facility] in the Last Year: Never true   Transportation Needs:     Lack of Transportation (Medical):      Lack of Transportation (Non-Medical):    Physical Activity:     Days of Exercise per Week:     Minutes of Exercise per Session:    Stress:     Feeling of Stress :    Social Connections:     Frequency of Communication with Friends and Family:     Frequency of Social Gatherings with Friends and Family:     Attends Quaker Services:     Active Member of Clubs or Organizations:     Attends Club or Organization Meetings:     Marital Status:    Intimate Partner Violence:     Fear of Current or Ex-Partner:     Emotionally Abused:     Physically Abused:     Sexually Abused:        Family History:       Problem Relation Age of Onset    Heart Disease Mother     Dementia Mother     Stroke Mother     Heart Disease Father     COPD Father     Heart Disease Brother     Arthritis Brother     Other Brother         AAA    Other Brother         AAA       Allergies:  Actos [pioglitazone], Lisinopril, Metformin and related, and Zithromax [azithromycin]    Home Medications:  Prior to Admission medications    Medication Sig Start Date End Date Taking? Authorizing Provider   pantoprazole (PROTONIX) 40 MG tablet Take 1 tablet by mouth every morning (before breakfast) 2/15/21   LUCIE Ledesma NP   Calcium Carb-Cholecalciferol (CALCIUM/VITAMIN D) 600-400 MG-UNIT TABS Take one tab po bid 11/16/20   Marlen Brar MD   furosemide (LASIX) 20 MG tablet take 1 tablet by mouth two times a week if needed for water retention 9/1/20   Theadora Oppenheim, APRN - CNP   sertraline (ZOLOFT) 100 MG tablet take 1 tablet by mouth once daily 7/27/20   Rex Durham MD   losartan-hydroCHLOROthiazide [facility]) 50-12.5 MG per tablet take 1 tablet by mouth once daily 6/23/20   Theadora Oppenheim, APRN - CNP   Continuous Blood Gluc Sensor (FREESTYLE SWETHA 14 DAY SENSOR) [facility] APPLY 1 SENSOR TO THE BACK OF ARM. REMOVE AND REPLACE EVERY 14 DAYS.  USE WITH DEVICE TO MONITOR BLOOD SUGAR 6/22/20   Rex Durham MD   atorvastatin (LIPITOR) 40 MG tablet take 1 tablet by mouth once daily 5/14/20   Theadora Oppenheim, APRN - CNP   metoclopramide (REGLAN) 5 MG tablet take 1 tablet by mouth twice a day 2/27/20   Rex Durham MD   insulin aspart (NOVOLOG) 100 UNIT/ML injection vial INJECT 60 UNITS VIA PUMP ONCE DAILY 2/3/20   Rex Durham MD levothyroxine (SYNTHROID) 88 MCG tablet take 1 tablet by mouth once daily 1/27/20   Corrinne Cowper, MD   isosorbide mononitrate (IMDUR) 60 MG extended release tablet take 1 tablet by mouth once daily 7/29/19   Lakhwinder Vale MD   metoprolol tartrate (LOPRESSOR) 25 MG tablet take 1 tablet by mouth twice a day 4/10/19   Lakhwinder Vale MD   HYDROcodone-acetaminophen [facility]) 7.5-325 MG per tablet take 1 tablet by mouth every 6 hours if needed 1/17/19   Historical Provider, MD   Continuous Blood Gluc  (FREE[employer]ark SWETHA 14 DAY READER) SYBIL USE AS DIRECTED. GIVE SENSORS ALSO 1/16/19   Corrinne Cowper, MD   sucralfate (CARAFATE) 1 GM tablet Take 1 tablet by mouth 4 times daily 12/6/17   Corrinne Cowper, MD   aspirin 81 MG tablet Take by mouth daily    Historical Provider, MD   FOLIC ACID PO Take by mouth    Historical Provider, MD   cyclobenzaprine (FLEXERIL) 5 MG tablet Take 5 mg by mouth 3 times daily as needed for Muscle spasms    Historical Provider, MD   gabapentin (NEURONTIN) 400 MG capsule Take 400 mg by mouth 3 times daily    Historical Provider, MD   Omega-3 Fatty Acids (FISH OIL) 1000 MG CPDR Take  by mouth.       Historical Provider, MD       Current Medications:   Current Facility-Administered Medications: glucose (GLUTOSE) 40 % oral gel 15 g, 15 g, Oral, PRN  dextrose 50 % IV solution, 12.5 g, Intravenous, PRN  glucagon (rDNA) injection 1 mg, 1 mg, Intramuscular, PRN  dextrose 5 % solution, 100 mL/hr, Intravenous, PRN  sodium chloride flush 0.9 % injection 5-40 mL, 5-40 mL, Intravenous, 2 times per day  sodium chloride flush 0.9 % injection 10 mL, 10 mL, Intravenous, PRN  0.9 % sodium chloride infusion, 25 mL, Intravenous, PRN  potassium chloride (KLOR-CON M) extended release tablet 40 mEq, 40 mEq, Oral, PRN **OR** potassium bicarb-citric acid (EFFER-K) effervescent tablet 40 mEq, 40 mEq, Oral, PRN **OR** potassium chloride 10 mEq/100 mL IVPB (Peripheral Line), 10 mEq, Intravenous, PRN  magnesium sulfate 1000 mg in dextrose 5% 100 mL IVPB, 1,000 mg, Intravenous, PRN  enoxaparin (LOVENOX) injection 40 mg, 40 mg, Subcutaneous, Daily  promethazine (PHENERGAN) tablet 12.5 mg, 12.5 mg, Oral, Q6H PRN **OR** ondansetron (ZOFRAN) injection 4 mg, 4 mg, Intravenous, Q6H PRN  polyethylene glycol (GLYCOLAX) packet 17 g, 17 g, Oral, Daily PRN  nicotine (NICODERM CQ) 21 MG/24HR 1 patch, 1 patch, Transdermal, Daily PRN  acetaminophen (TYLENOL) tablet 650 mg, 650 mg, Oral, Q6H PRN **OR** acetaminophen (TYLENOL) suppository 650 mg, 650 mg, Rectal, Q6H PRN  piperacillin-tazobactam (ZOSYN) 3,375 mg in dextrose 5 % 50 mL IVPB extended infusion (mini-bag), 3,375 mg, Intravenous, Q8H  vancomycin (VANCOCIN) intermittent dosing (placeholder), , Other, RX Placeholder  vancomycin (VANCOCIN) 1250 mg in dextrose 5 % 250 mL IVPB, 1,250 mg, Intravenous, Q24H  atorvastatin (LIPITOR) tablet 40 mg, 40 mg, Oral, Nightly  gabapentin (NEURONTIN) capsule 400 mg, 400 mg, Oral, TID  isosorbide mononitrate (IMDUR) extended release tablet 60 mg, 60 mg, Oral, Daily  metoprolol tartrate (LOPRESSOR) tablet 25 mg, 25 mg, Oral, BID  pantoprazole (PROTONIX) tablet 40 mg, 40 mg, Oral, QAM AC  sertraline (ZOLOFT) tablet 100 mg, 100 mg, Oral, Daily  metoclopramide (REGLAN) tablet 5 mg, 5 mg, Oral, BID  morphine (PF) injection 2 mg, 2 mg, Intravenous, Q4H PRN  insulin lispro (HUMALOG) injection vial 0-6 Units, 0-6 Units, Subcutaneous, TID WC  insulin lispro (HUMALOG) injection vial 0-3 Units, 0-3 Units, Subcutaneous, Nightly  glucose (GLUTOSE) 40 % oral gel 15 g, 15 g, Oral, PRN  dextrose 50 % IV solution, 12.5 g, Intravenous, PRN  glucagon (rDNA) injection 1 mg, 1 mg, Intramuscular, PRN  dextrose 5 % solution, 100 mL/hr, Intravenous, PRN  oxyCODONE-acetaminophen (PERCOCET) 5-325 MG per tablet 1 tablet, 1 tablet, Oral, Q4H PRN    REVIEW OF SYSTEMS:       CONSTITUTIONAL: negative for fatigue and malaise   EYES: negative for double vision and photophobia    HEENT: negative for tinnitus and sore throat   RESPIRATORY: negative for cough, shortness of breath   CARDIOVASCULAR: negative for chest pain, palpitations   GASTROINTESTINAL: negative for nausea, vomiting   GENITOURINARY: negative for incontinence   MUSCULOSKELETAL: negative for neck or back pain   NEUROLOGICAL: negative for seizures   PSYCHIATRIC: negative for agitated     Review of systems otherwise negative. PHYSICAL EXAM:       BP (!) 125/102   Pulse 88   Temp 99.6 °F (37.6 °C) (Oral)   Resp 18   Ht 5' 2\" (1.575 m)   Wt 203 lb 11.2 oz (92.4 kg)   SpO2 97%   BMI 37.26 kg/m²       CONSTITUTIONAL: no apparent distress, well developed, well nourished, alert and oriented x 3, No dysarthria, no aphasia. EOMI.     HEAD: normocephalic, atraumatic   EYES: PERRL, EOMI, no lateral or horizontal nystagmus bilaterally    ENT: moist mucous membranes, uvula midline   NECK: supple, symmetric, no midline tenderness to palpation   BACK: without midline tenderness, step-offs or deformities   NEUROLOGIC:  EYE OPENING     Spontaneous - 4 []       To voice - 3 []       To pain - 2 []       None - 1 []    VERBAL RESPONSE     Appropriate, oriented - 5 []       Dazed or confused - 4 []       Syllables, expletives - 3 []       Grunts - 2 []       None - 1 []    MOTOR RESPONSE     Spontaneous, command - 6 [x]       Localizes pain - 5 []       Withdraws pain - 4 []       Abnormal flexion - 3 []       Abnormal extension - 2 []       None - 1 []            Total GCS: 15              Cranial Nerves:    Cranial Nerves:    II: Visual acuity:   II: Visual fields:    III: Pupils:  equal, round, reactive to light  III,IV,VI: Extra Ocular Movements:  V: Facial sensation:    VII: Facial strength:  VIII: Hearing:  intact  IX: Palate:  intact  XI: Shoulder shrug:    XII: Tongue movement:         Motor Exam:  L deltoid 5/5; R deltoid 5/5  L biceps 5/5; R biceps 5/5  L triceps 5/5; R triceps 5/5  L wrist extension 5/5; R wrist extension 5/5  L intrinsics 5/5; R intrinsics 5/5      L iliopsoas 5/5 , R iliopsoas 5/5  L quadriceps 5/5; R quadriceps 5/5  L Dorsiflexion 5/5; R dorsiflexion 5/5  L Plantarflexion 5/5; R plantarflexion 5/5  L EHL 5/5; R EHL 5/5    Drift:  absent  Tone:  normal  Sensory:    Right Upper Extremity:  normal  Left Upper Extremity:  normal  Right Lower Extremity:  normal  Left Lower Extremity:  normal      Plantar Response:    Right:  equivocal  Left:  equivocal    Clonus:  absent  Pagan's:  absent    Gait:  Deferred    SKIN: no rash on exposed skin, erythema area noted at battery site of spinal cord stimulator       LABS AND IMAGING:     CBC with Differential:    Lab Results   Component Value Date    WBC 9.3 05/31/2021    RBC 3.76 05/31/2021    RBC 4.34 10/13/2011    HGB 10.9 05/31/2021    HCT 32.4 05/31/2021     05/31/2021     10/13/2011    MCV 86.2 05/31/2021    MCH 28.9 05/31/2021    MCHC 33.5 05/31/2021    RDW 13.4 05/31/2021    LYMPHOPCT 14 05/31/2021    MONOPCT 10 05/31/2021    BASOPCT 1 05/31/2021    MONOSABS 1.00 05/31/2021    LYMPHSABS 1.30 05/31/2021    EOSABS 0.10 05/31/2021    BASOSABS 0.00 05/31/2021    DIFFTYPE NOT REPORTED 05/31/2021     BMP:    Lab Results   Component Value Date     05/31/2021    K 3.8 05/31/2021    CL 94 05/31/2021    CO2 24 05/31/2021    BUN 19 05/31/2021    LABALBU 3.8 05/31/2021    CREATININE 0.90 05/31/2021    CALCIUM 9.4 05/31/2021    GFRAA >60 05/31/2021    LABGLOM >60 05/31/2021    GLUCOSE 367 05/31/2021    GLUCOSE 310 10/13/2011       Radiology Review:  No results found.       ASSESSMENT AND PLAN:       Patient Active Problem List   Diagnosis    Degeneration of lumbar or lumbosacral intervertebral disc    Postlaminectomy syndrome, lumbar region    Osteoarthritis    CAD (coronary artery disease)    Restless legs syndrome    Sleep apnea    Chest pressure    Gastroesophageal reflux disease without esophagitis    Bloated abdomen    Diabetes ([address]    Insulin pump in place    Headache    Pure hypercholesterolemia    Diabetic polyneuropathy ([address]    Ulcer of lower extremity ([address]    Essential hypertension    Mixed hyperlipidemia    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 female with infection of spinal cord stimulator  Patient care will be discussed with attending, will reevaluated patient along with attending. Continue NPO at this time   STAT CT thoracic spine as she is having tenderness    preop labs completed    Will plan for surgery today to explant spinal cord stimulator and wash out    Additional recommendations may follow    Please contact neurosurgery with any changes in patients neurologic status. Thank you for your consult.        LUCIE Horn NP     6/1/2021  6:31 AM

## 2021-06-01 NOTE — PROGRESS NOTES
Physical Therapy    DATE: 2021    NAME: Ayesha Aragon  MRN: [id number]   : 1955      Patient not seen this date for Physical Therapy due to: Other: Pt at CT; to have explant spinal cord stimulator and wash out today; check status 6/2 and evaluate as appropriate. Electronically signed by AARON Stallworth on 2021 at 8:51 AM   This treatment/evaluation completed by signing SPT. Signing PT agrees with treatment and documentation.   Arlene Velásquez, PT

## 2021-06-01 NOTE — PROGRESS NOTES
Pharmacy Note  Vancomycin Consult    Didier Santa is a 72 y.o. female started on Vancomycin for wound infection; consult received from Dr. Evi Hernandez  to manage therapy. Also receiving the following antibiotics: zosyn. Patient Active Problem List   Diagnosis    Degeneration of lumbar or lumbosacral intervertebral disc    Postlaminectomy syndrome, lumbar region    Osteoarthritis    CAD (coronary artery disease)    Restless legs syndrome    Sleep apnea    Chest pressure    Gastroesophageal reflux disease without esophagitis    Bloated abdomen    Diabetes (HCC)    Insulin pump in place    Headache    Pure hypercholesterolemia    Diabetic [address]    Ulcer of lower extremity ([facility]    Essential hypertension    Mixed hyperlipidemia    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 ([facility]    Soft tissue infection     Allergies:  Actos [pioglitazone], Lisinopril, Metformin and related, and Zithromax [azithromycin]     Temp max: N/A    Recent Labs     05/31/21  1500   BUN 19   CREATININE 0.90   WBC 9.3     No intake or output data in the 24 hours ending 05/31/21 2159  Culture Date      Source                       Results  N/A    Ht Readings from Last 1 Encounters:   05/31/21 5' 2\" (1.575 m)        Wt Readings from Last 1 Encounters:   05/31/21 183 lb (83 kg)       There is no height or weight on file to calculate BMI. Estimated Creatinine Clearance: 62 mL/min (based on SCr of 0.9 mg/dL). Goal Trough Level:  10-20 mcg/mL    Assessment/Plan:  Will initiate Vancomycin with a one time loading dose of 2000 mg x1 (given at outside hospital at 1735 today 5/31), followed by 1250 mg IV every 24 hours. Timing of trough level will be determined based on culture results, renal function, and clinical response. Thank you for the consult. Will continue to follow. Chase Barrera, Pharm. D.

## 2021-06-01 NOTE — PROGRESS NOTES
[facility]  Office: [address], DO, Oleksandr Chahal, DO, Arina Masonoscar, DO, Gavino Shahbaz Blood, DO, Camryn Tejada MD, Concha Lambert MD, Mikayla Haynes MD, Elenita Sampson MD, Marlen Regaaldo MD, Li Bah MD, Silvana Moeller MD, Steve Weber MD, Nevaeh Neal, DO, Prashant Lemons MD, Mayra Jang, DO, Gonzalo Guevara MD,  Kate Boston DO, Matti Dupree MD, Belle Merino MD, Britt Lagos MD, Wil Lin MD, Lesli Pittman, Oscar Mccarthy, CNP, Navid Man, CNP, Chani Monet, CNS, Kolton Woodall, CNP, Venita Parr, CNP, Miko Bonds, CNP, Katherine Obregon, CNP, Antonio Lozada, CNP, Luba Hoover PAJosé MiguelC, Kendall Pitt, [facility], Stanley Barger, CNP, Tip Uribe, CNP, Tania Langley, CNP, Anthony French, CNP, Dandre Cornejo, CNP, Kitty Braga, [address] [place] [address]    Progress Note    6/1/2021    7:56 AM    Name:   Shad Stephensno  MRN:     [id number]     Acct:      [de-identified]   Room:   [facility]/2132-30   Day:  1  Admit Date:  5/31/2021  9:16 PM    PCP:   Aysha Owens MD  Code Status:  Full Code    Subjective:     C/C:left gluteal infection  Interval History Status: not changed. Patient seen and evaluated in room has been n.p.o. for spinal stimulator explantation/replacement  Monitoring blood sugars with insulin pump  No significant events overnight, vital signs stable. T-max 99.6  Covid screen negative    Brief History:     Shad Stephenson is a 72 y.o. Non-/non  female who presents with wound infection and is admitted to the hospital for the management of infected implant/stimulator.       Patient [facility] to [facility] emergency room with a concern of infection at the site of her spinal cord stimulator. Patient states that she had her spinal cord stimulator battery replaced 4/16/21 and the site of the surgery is now infected.   Patient states that she started to develop redness and swelling at the site and then was seen in the [facility] emergency room and was sent to [place] [address] for further evaluation by the neurosurgery service. Review of Systems:     Constitutional:  negative for chills, fevers, sweats, left gluteal pain at spinal stimulator site   Respiratory:  negative for cough, dyspnea on exertion, shortness of breath, wheezing  Cardiovascular:  negative for chest pain, chest pressure/discomfort, lower extremity edema, palpitations  Gastrointestinal:  negative for abdominal pain, constipation, diarrhea, nausea, vomiting  Neurological:  negative for dizziness, headache    Medications: Allergies:     Allergies   Allergen Reactions    Actos [Pioglitazone] Other (See Comments)     Weight gain    Lisinopril Other (See Comments)     cough    Metformin And Related Diarrhea    Zithromax [Azithromycin] Itching       Current Meds:   Scheduled Meds:    sodium chloride flush  5-40 mL Intravenous 2 times per day    enoxaparin  40 mg Subcutaneous Daily    piperacillin-tazobactam  3,375 mg Intravenous Q8H    vancomycin (VANCOCIN) intermittent dosing (placeholder)   Other RX Placeholder    vancomycin  1,250 mg Intravenous Q24H    atorvastatin  40 mg Oral Nightly    gabapentin  400 mg Oral TID    isosorbide mononitrate  60 mg Oral Daily    metoprolol tartrate  25 mg Oral BID    pantoprazole  40 mg Oral QAM AC    sertraline  100 mg Oral Daily    metoclopramide  5 mg Oral BID    insulin lispro  0-6 Units Subcutaneous TID WC    insulin lispro  0-3 Units Subcutaneous Nightly     Continuous Infusions:    dextrose      sodium chloride      dextrose       PRN Meds: glucose, dextrose, glucagon (rDNA), dextrose, sodium chloride flush, sodium chloride, potassium chloride **OR** potassium alternative oral replacement **OR** potassium chloride, magnesium sulfate, promethazine **OR** ondansetron, polyethylene glycol, nicotine, acetaminophen **OR** acetaminophen, morphine, glucose, dextrose, glucagon (rDNA), dextrose, oxyCODONE-acetaminophen    Data:     Past Medical History:   has a past medical history of CAD (coronary artery disease), Cataracts, bilateral, Chest pain, Chronic back pain, Depression, Diabetes ([facility] Diabetic polyneuropathy ([facility] EKG abnormalities, GERD (gastroesophageal reflux disease), Headache, Hyperlipidemia, Hypertension, Hypothyroidism, Insulin pump in place, Neuropathy, Osteoarthritis, Peripheral vascular disease ([facility] Pure hypercholesterolemia, Restless legs syndrome, Sleep apnea, Ulcer of lower extremity ([facility] and Wears glasses. Social History:   reports that she has never smoked. She has never used smokeless tobacco. She reports that she does not drink alcohol and does not use drugs. Family History:   Family History   Problem Relation Age of Onset    Heart Disease Mother     Dementia Mother     Stroke Mother     Heart Disease Father     COPD Father     Heart Disease Brother     Arthritis Brother     Other Brother         AAA    Other Brother         AAA       Vitals:  BP (!) 125/102   Pulse 88   Temp 99.6 °F (37.6 °C) (Oral)   Resp 18   Ht 5' 2\" (1.575 m)   Wt 203 lb 11.2 oz (92.4 kg)   SpO2 97%   BMI 37.26 kg/m²   Temp (24hrs), Av.1 °F (37.3 °C), Min:98.6 °F (37 °C), Max:99.6 °F (37.6 °C)    Recent Labs     21  2337   POCGLU 83       I/O (24Hr):   No intake or output data in the 24 hours ending 21 0756    Labs:  Hematology:  Recent Labs     21  1500 21  0619   WBC 9.3 9.5   RBC 3.76* 3.46*   HGB 10.9* 10.1*   HCT 32.4* 29.5*   MCV 86.2 85.3   MCH 28.9 29.2   MCHC 33.5 34.2   RDW 13.4 12.8    171   MPV 7.5 10.1   SEDRATE 33*  --    .2*  --      Chemistry:  Recent Labs     21  1500 21  0619   * 132*   K 3.8 3.7   CL 94* 99   CO2 24 24   GLUCOSE 367* 196*   BUN 19 14   CREATININE 0.90 0.77   MG 2.1  --    ANIONGAP 13 9   LABGLOM >60 >60   GFRAA >60 >60   CALCIUM 9.4 of Zoloft    6.  GI/DVT prophylaxis Protonix, Lovenox on hold secondary to planned surgical intervention    LUCIE Cai NP  6/1/2021  7:56 AM

## 2021-06-01 NOTE — CONSULTS
[facility] -   Infectious diseases evaluation  admission date 5/31/2021    reason for consultation:   Infection of spinal cord stimulator    Impression :   Current:  · Infection of spinal cord stimulator  · 6/1 explant stimulator and wash out  · suspect T spine osteomyelitis / infection   · Elevated CRP: 168.2  · Elevated lactate: 2.6    Other:  Diabetes type 2  Allergy zithromax - itching , no rash    Discussion / summary of stay / plan of care   · 4/16 Spinal cord stimulator placed, leads in the T spine Dr Donaldo Saul anesthesia  · 5/31 Erythema, warmth, swelling, pain, and purulent discharge from site  · T spine sx site red tender- CT T spine no tenderness  Recommendations   · Continue vancomycin and zosyn  · Adjust to OR cx  · Concerns for T spine OM - will need T sp[ine MRI after pacer extraction  · Waiting on blood cultures    Infection Control Recommendations   · [place] Precautions    Antimicrobial Stewardship Recommendations   · Simplification of therapy  · Targeted therapy  · Per Kg dosing  Coordination ofOutpatient Care:   · Estimated Length of IV antimicrobials:  · Patient will need Midline / picc Catheter Insertion:   · Patient will need SNF:  · Patient will need outpatient wound care:     History of Present Illness:   Initial history:  Braydon Esparza is a 72y.o.-year-old female presented to the ED 5/31 for wound infection. Patient recently had a back stimulator placed by Dr. Nessa Galvez 4/16/21. She states she went for a battery change yesterday and her pain doctor sent her here for erythema, warmth, swelling, and some purulent discharge from the site. Patient complains of pain and tenderness in her low back. She states she is also having issues of getting around due to the pain. Patient also has an insulin pump. Scheduled for surgery today to explant stimulator and wash out    6/1 on exam the T spine site is red and tender - the pacer site is bulging very tender Head: Normocephalic. Nose: Nose normal.      Mouth/Throat:      Mouth: Mucous membranes are moist.   Eyes:      General: No scleral icterus. Conjunctiva/sclera: Conjunctivae normal.   Cardiovascular:      Rate and Rhythm: Normal rate and regular rhythm. Heart sounds: Normal heart sounds. Pulmonary:      Effort: Pulmonary effort is normal. No respiratory distress. Breath sounds: Normal breath sounds. Abdominal:      Tenderness: There is no abdominal tenderness. There is no guarding. Genitourinary:     Comments: No fleming  Musculoskeletal:      Cervical back: Normal range of motion and neck supple. Right lower leg: No edema. Left lower leg: No edema. Skin:     General: Skin is warm. Coloration: Skin is not jaundiced. Findings: Erythema and wound present. Comments: Midline thoracic patient has swelling and tenderness. Lower left lumbar: area is swollen, red, with yellow/clear discharge. Tender to touch   Neurological:      General: No focal deficit present. Mental Status: She is alert and oriented to person, place, and time. Psychiatric:         Mood and Affect: Mood normal.         Behavior: Behavior normal.         Past Medical History:     Past Medical History:   Diagnosis Date    CAD (coronary artery disease)     \"40 % blockage lower heart\"    Cataracts, bilateral     Chest pain     pressure    Chronic back pain     Depression     Diabetes ([address]     Diabetic polyneuropathy ([address]     EKG abnormalities 11/14/2016    RT. BBB    GERD (gastroesophageal reflux disease)     Headache     Hyperlipidemia     Hypertension     Hypothyroidism     Insulin pump in place     Neuropathy     Osteoarthritis     Peripheral vascular disease (HCC)     Pure hypercholesterolemia     Restless legs syndrome     Sleep apnea     Ulcer of lower extremity ([address]     Wears glasses        Past Surgical  History:     Past Surgical History:   Procedure Laterality Date    BACK SURGERY      cage in place at L5-S1., SPINAL CORD STIMULATOR    CARPAL TUNNEL RELEASE Bilateral     CATARACT REMOVAL WITH IMPLANT Right 05/16/2017    CATARACT REMOVAL WITH IMPLANT Left 06/13/2017    DR SHARON HERRERA    CHOLECYSTECTOMY      COLONOSCOPY  07/13/2004    normal    COLONOSCOPY  09/14/2016    no lesions seen, spasms sigmoid colon    COLONOSCOPY  01/14/2021    DR KARLEY MARTINS-    CYST REMOVAL Right     GANGLION CYST REMOVED.  ENDOSCOPY, COLON, DIAGNOSTIC      EGD    JOINT REPLACEMENT Left     knee    OTHER SURGICAL HISTORY  11/21/2016    Revision of spinal cord stimulator    SPINAL CORD STIMULATOR SURGERY N/A 4/16/2021    SPINAL CORD STIMULATOR  BATTERY REPLACEMENT - ABBOTT  -   dr najera closer please performed by Drew Castillo MD at [address]  08/16/2016    NO LESIONS SEEN    UPPER GASTROINTESTINAL ENDOSCOPY  01/14/2021    ESOPHAGEAL POLYP - DR Enedelia MARTINS       Medications:      sodium chloride flush  5-40 mL Intravenous 2 times per day    [Held by provider] enoxaparin  40 mg Subcutaneous Daily    piperacillin-tazobactam  3,375 mg Intravenous Q8H    vancomycin (VANCOCIN) intermittent dosing (placeholder)   Other RX Placeholder    vancomycin  1,250 mg Intravenous Q24H    atorvastatin  40 mg Oral Nightly    gabapentin  400 mg Oral TID    isosorbide mononitrate  60 mg Oral Daily    metoprolol tartrate  25 mg Oral BID    pantoprazole  40 mg Oral QAM AC    sertraline  100 mg Oral Daily    metoclopramide  5 mg Oral BID    insulin lispro  0-6 Units Subcutaneous TID WC    insulin lispro  0-3 Units Subcutaneous Nightly       Social History:     Social History     Socioeconomic History    Marital status:       Spouse name: Not on file    Number of children: Not on file    Years of education: Not on file    Highest education level: Not on file   Occupational History    Not on file   Tobacco Use    Smoking status: Never Smoker    Smokeless tobacco: Never Used   Vaping Use    Vaping Use: Never used   Substance and Sexual Activity    Alcohol use: No    Drug use: No    Sexual activity: Not on file   Other Topics Concern    Not on file   Social History Narrative    Not on file     Social Determinants of Health     Financial Resource Strain: Low Risk     Difficulty of Paying Living Expenses: Not hard at all   Food Insecurity: No Food Insecurity    Worried About Running Out of Food in the Last Year: Never true    [address] in the Last Year: Never true   Transportation Needs:     Lack of Transportation (Medical):      Lack of Transportation (Non-Medical):    Physical Activity:     Days of Exercise per Week:     Minutes of Exercise per Session:    Stress:     Feeling of Stress :    Social Connections:     Frequency of Communication with Friends and Family:     Frequency of Social Gatherings with Friends and Family:     Attends Rastafari Services:     Active Member of Clubs or Organizations:     Attends Club or Organization Meetings:     Marital Status:    Intimate Partner Violence:     Fear of Current or Ex-Partner:     Emotionally Abused:     Physically Abused:     Sexually Abused:        Family History:     Family History   Problem Relation Age of Onset    Heart Disease Mother     Dementia Mother     Stroke Mother     Heart Disease Father     COPD Father     Heart Disease Brother     Arthritis Brother     Other Brother         AAA    Other Brother         AAA      Medical Decision Making:   I have independently reviewed/ordered the following labs:    CBC with Differential:   Recent Labs     05/31/21  1500 06/01/21  0619   WBC 9.3 9.5   HGB 10.9* 10.1*   HCT 32.4* 29.5*    171   LYMPHOPCT 14*  --    MONOPCT 10*  --      BMP:  Recent Labs     05/31/21  1500 06/01/21  0619   * 132*   K 3.8 3.7   CL 94* 99   CO2 24 24   BUN 19 14   CREATININE 0.90 0.77   MG 2.1  --      Hepatic Function Panel:   Recent Labs     05/31/21  1500   PROT 7.0   LABALBU 3.8   BILITOT 0.63   ALKPHOS 83   ALT 19   AST 18     No results for input(s): RPR in the last 72 hours. No results for input(s): HIV in the last 72 hours. No results for input(s): BC in the last 72 hours. Lab Results   Component Value Date    CREATININE 0.77 06/01/2021    GLUCOSE 196 06/01/2021    GLUCOSE 310 10/13/2011       Detailed results: Thank you for allowing us to participate in the care of this patient. Please call with questions. This note is created with the assistance of a speech recognition program.  While intending to generate adocument that actually reflects the content of the visit, the document can still have some errors including those of syntax and sound a like substitutions which may escape proof reading. It such instances, actual meaningcan be extrapolated by contextual diversion. Sandy Platt, [address] [place] [address]  Office: [phone]  Perfect serve / office [phone]    I have discussed the care of the patient, including pertinent history and exam findings,  with the student. I have seen and examined the patient and the key elements of all parts of the encounter have been performed by me.   I agree with the assessment, plan and orders as documented by the student    Kitty Solis, Infectious Diseases

## 2021-06-01 NOTE — PROGRESS NOTES
Reported blood sugar of 74 to Dr Priya Blood, pt is feeling fine and Is asymptomatic at present, orders received

## 2021-06-01 NOTE — ANESTHESIA POSTPROCEDURE EVALUATION
Department of Anesthesiology  Postprocedure Note    Patient: Bruna Bamberger  MRN: [id number]  YOB: 1955  Date of evaluation: 6/1/2021  Time:  7:32 PM     Procedure Summary     Date: 06/01/21 Room / Location: [address]    Anesthesia Start: 1411 Anesthesia Stop: 1920    Procedure: EXPLANT PADDLE SPINAL CORD STIMULATOR, I & D OF WOUND (N/A Back) Diagnosis: (INFECTED SPINAL CORD STIMULATOR)    Surgeons: Clau Acevedo DO Responsible Provider: France Olvera MD    Anesthesia Type: general ASA Status: 3          Anesthesia Type: general    Delvis Phase I:      Delvis Phase II:      Last vitals: Reviewed and per EMR flowsheets.        Anesthesia Post Evaluation    Patient location during evaluation: PACU  Patient participation: complete - patient participated  Level of consciousness: awake and alert  Pain score: 2  Airway patency: patent  Nausea & Vomiting: no vomiting and no nausea  Complications: no  Cardiovascular status: hemodynamically stable  Respiratory status: acceptable  Hydration status: stable

## 2021-06-01 NOTE — PROGRESS NOTES
Occupational Therapy    Occupational Therapy Not Seen Note    DATE: 2021  Name: Ramesh Carter  : 1955  MRN: [id number]    Patient not available for Occupational Therapy due to:    Testing: STAT CT thoracic spine ordered as pt is having tenderness   Surgery planned for today to explant spinal cord stimulator and wash out.     Next Scheduled Treatment: 2021    Electronically signed by PEGGY Romero on 2021 at 9:27 AM

## 2021-06-02 LAB
EKG ATRIAL RATE: 75 BPM
EKG P AXIS: 11 DEGREES
EKG P-R INTERVAL: 136 MS
EKG Q-T INTERVAL: 402 MS
EKG QRS DURATION: 120 MS
EKG QTC CALCULATION (BAZETT): 448 MS
EKG R AXIS: -47 DEGREES
EKG T AXIS: 60 DEGREES
EKG VENTRICULAR RATE: 75 BPM
GLUCOSE BLD-MCNC: 176 MG/DL (ref 65–105)
GLUCOSE BLD-MCNC: 183 MG/DL (ref 65–105)
GLUCOSE BLD-MCNC: 198 MG/DL (ref 65–105)
GLUCOSE BLD-MCNC: 74 MG/DL (ref 65–105)
GLUCOSE BLD-MCNC: 93 MG/DL (ref 65–105)

## 2021-06-02 PROCEDURE — 6370000000 HC RX 637 (ALT 250 FOR IP): Performed by: STUDENT IN AN ORGANIZED HEALTH CARE EDUCATION/TRAINING PROGRAM

## 2021-06-02 PROCEDURE — 99232 SBSQ HOSP IP/OBS MODERATE 35: CPT | Performed by: FAMILY MEDICINE

## 2021-06-02 PROCEDURE — 97161 PT EVAL LOW COMPLEX 20 MIN: CPT

## 2021-06-02 PROCEDURE — 97530 THERAPEUTIC ACTIVITIES: CPT

## 2021-06-02 PROCEDURE — 6360000002 HC RX W HCPCS: Performed by: STUDENT IN AN ORGANIZED HEALTH CARE EDUCATION/TRAINING PROGRAM

## 2021-06-02 PROCEDURE — 97165 OT EVAL LOW COMPLEX 30 MIN: CPT

## 2021-06-02 PROCEDURE — APPSS45 APP SPLIT SHARED TIME 31-45 MINUTES: Performed by: NURSE PRACTITIONER

## 2021-06-02 PROCEDURE — 97110 THERAPEUTIC EXERCISES: CPT

## 2021-06-02 PROCEDURE — 1200000000 HC SEMI PRIVATE

## 2021-06-02 PROCEDURE — 2580000003 HC RX 258: Performed by: STUDENT IN AN ORGANIZED HEALTH CARE EDUCATION/TRAINING PROGRAM

## 2021-06-02 PROCEDURE — 93010 ELECTROCARDIOGRAM REPORT: CPT | Performed by: INTERNAL MEDICINE

## 2021-06-02 PROCEDURE — APPSS30 APP SPLIT SHARED TIME 16-30 MINUTES: Performed by: PHYSICIAN ASSISTANT

## 2021-06-02 PROCEDURE — 99232 SBSQ HOSP IP/OBS MODERATE 35: CPT | Performed by: INTERNAL MEDICINE

## 2021-06-02 PROCEDURE — 97535 SELF CARE MNGMENT TRAINING: CPT

## 2021-06-02 RX ADMIN — OXYCODONE HYDROCHLORIDE AND ACETAMINOPHEN 1 TABLET: 5; 325 TABLET ORAL at 17:34

## 2021-06-02 RX ADMIN — PIPERACILLIN AND TAZOBACTAM 3375 MG: 3; .375 INJECTION, POWDER, LYOPHILIZED, FOR SOLUTION INTRAVENOUS at 22:21

## 2021-06-02 RX ADMIN — METOPROLOL TARTRATE 25 MG: 25 TABLET ORAL at 09:15

## 2021-06-02 RX ADMIN — METOCLOPRAMIDE 5 MG: 5 TABLET ORAL at 09:15

## 2021-06-02 RX ADMIN — PIPERACILLIN AND TAZOBACTAM 3375 MG: 3; .375 INJECTION, POWDER, LYOPHILIZED, FOR SOLUTION INTRAVENOUS at 05:49

## 2021-06-02 RX ADMIN — GABAPENTIN 400 MG: 400 CAPSULE ORAL at 21:08

## 2021-06-02 RX ADMIN — GABAPENTIN 400 MG: 400 CAPSULE ORAL at 14:15

## 2021-06-02 RX ADMIN — GABAPENTIN 400 MG: 400 CAPSULE ORAL at 09:15

## 2021-06-02 RX ADMIN — PANTOPRAZOLE SODIUM 40 MG: 40 TABLET, DELAYED RELEASE ORAL at 05:56

## 2021-06-02 RX ADMIN — PIPERACILLIN AND TAZOBACTAM 3375 MG: 3; .375 INJECTION, POWDER, LYOPHILIZED, FOR SOLUTION INTRAVENOUS at 14:15

## 2021-06-02 RX ADMIN — SERTRALINE 100 MG: 50 TABLET, FILM COATED ORAL at 09:15

## 2021-06-02 RX ADMIN — MORPHINE SULFATE 2 MG: 2 INJECTION, SOLUTION INTRAMUSCULAR; INTRAVENOUS at 02:43

## 2021-06-02 RX ADMIN — OXYCODONE HYDROCHLORIDE AND ACETAMINOPHEN 1 TABLET: 5; 325 TABLET ORAL at 11:43

## 2021-06-02 RX ADMIN — DESMOPRESSIN ACETATE 40 MG: 0.2 TABLET ORAL at 21:07

## 2021-06-02 RX ADMIN — METOCLOPRAMIDE 5 MG: 5 TABLET ORAL at 21:07

## 2021-06-02 RX ADMIN — MORPHINE SULFATE 2 MG: 2 INJECTION, SOLUTION INTRAMUSCULAR; INTRAVENOUS at 18:33

## 2021-06-02 RX ADMIN — VANCOMYCIN HYDROCHLORIDE 1250 MG: 10 INJECTION, POWDER, LYOPHILIZED, FOR SOLUTION INTRAVENOUS at 18:33

## 2021-06-02 RX ADMIN — OXYCODONE HYDROCHLORIDE AND ACETAMINOPHEN 1 TABLET: 5; 325 TABLET ORAL at 06:02

## 2021-06-02 RX ADMIN — SODIUM CHLORIDE, PRESERVATIVE FREE 10 ML: 5 INJECTION INTRAVENOUS at 21:10

## 2021-06-02 RX ADMIN — ISOSORBIDE MONONITRATE 60 MG: 60 TABLET ORAL at 09:15

## 2021-06-02 RX ADMIN — METOPROLOL TARTRATE 25 MG: 25 TABLET ORAL at 21:07

## 2021-06-02 ASSESSMENT — ENCOUNTER SYMPTOMS
DIARRHEA: 0
NAUSEA: 0
EYE DISCHARGE: 0
SORE THROAT: 0
ABDOMINAL DISTENTION: 0
COUGH: 0
EYE REDNESS: 0
SHORTNESS OF BREATH: 0
BACK PAIN: 1
CONSTIPATION: 0
CHOKING: 0
BLOOD IN STOOL: 0
ABDOMINAL PAIN: 0

## 2021-06-02 ASSESSMENT — PAIN SCALES - GENERAL
PAINLEVEL_OUTOF10: 9
PAINLEVEL_OUTOF10: 6
PAINLEVEL_OUTOF10: 9
PAINLEVEL_OUTOF10: 4
PAINLEVEL_OUTOF10: 8
PAINLEVEL_OUTOF10: 7
PAINLEVEL_OUTOF10: 3
PAINLEVEL_OUTOF10: 8
PAINLEVEL_OUTOF10: 9
PAINLEVEL_OUTOF10: 8

## 2021-06-02 ASSESSMENT — PAIN - FUNCTIONAL ASSESSMENT: PAIN_FUNCTIONAL_ASSESSMENT: ACTIVITIES ARE NOT PREVENTED

## 2021-06-02 ASSESSMENT — PAIN DESCRIPTION - ORIENTATION
ORIENTATION: LOWER
ORIENTATION: LEFT;LOWER

## 2021-06-02 ASSESSMENT — PAIN DESCRIPTION - PAIN TYPE
TYPE: SURGICAL PAIN
TYPE: ACUTE PAIN

## 2021-06-02 ASSESSMENT — PAIN DESCRIPTION - DESCRIPTORS
DESCRIPTORS: SHARP;STABBING
DESCRIPTORS: STABBING

## 2021-06-02 ASSESSMENT — PAIN DESCRIPTION - LOCATION
LOCATION: BACK
LOCATION: BACK

## 2021-06-02 NOTE — PROGRESS NOTES
[facility] -   Infectious diseases evaluation  admission date 5/31/2021    reason for consultation:   Infection of spinal cord stimulator    Impression :   Current:  · Infection of spinal cord stimulator  · 6/1 explant stimulator and wash out  · suspect T spine osteomyelitis / infection   · Elevated CRP: 168.2  · Elevated lactate: 2.6    Other:  Diabetes type 2  Allergy zithromax - itching , no rash    Discussion / summary of stay / plan of care   · 4/16 Spinal cord stimulator placed, leads in the T spine Dr Larisa Orr anesthesia  · 5/31 Erythema, warmth, swelling, pain, and purulent discharge from site  · T spine sx site red tender- CT T spine no tenderness  Recommendations   · Continue vancomycin   · Stop Zosyn at this time  · Adjust to OR cx  · Possible Concerns for T spine OM -get MRI of the thoracic spine to better evaluate the extent of the infection  · Waiting on blood cultures    Infection Control Recommendations   · [place] Precautions    Antimicrobial Stewardship Recommendations   · Simplification of therapy  · Targeted therapy  · Per Kg dosing  Coordination ofOutpatient Care:   · Estimated Length of IV antimicrobials:  · Patient will need Midline / picc Catheter Insertion:   · Patient will need SNF:  · Patient will need outpatient wound care:     History of Present Illness:   Initial history:  Cash Alamo is a 72y.o.-year-old female presented to the ED 5/31 for wound infection. Patient recently had a back stimulator placed by Dr. Clerance Gilford 4/16/21. She states she went for a battery change yesterday and her pain doctor sent her here for erythema, warmth, swelling, and some purulent discharge from the site. Patient complains of pain and tenderness in her low back. She states she is also having issues of getting around due to the pain. Patient also has an insulin pump. Scheduled for surgery today to explant stimulator and wash out    6/1 on exam the T spine site is red and tender - the pacer site is bulging very tender and red -no fever associated    Interval changes  6/2/2021   Patient Vitals for the past 8 hrs:   BP Temp Temp src Pulse Resp SpO2 Weight   06/02/21 0755 112/61 97.5 °F (36.4 °C) Oral 67 18 96 % --   06/02/21 0602 -- -- -- -- -- -- 207 lb (93.9 kg)     No fever chills, and pain post surgery  went for surgery 6/1 and had the pain pump removed, pus was found at the tip of the our wires in the spinal area. Pus was also found in the pocket of the pain pump  6/1 OR cultures are growing SA    Disc w NS, no great suspicion for deep spinal infection      Summary of relevant labs:  Labs:  WBC: 9.5   Creat 0.77  Elevated CRP: 168.2  Elevated lactate: 2.6  Afebrile    Micro:  BC 5/31 neg  Spinal pain unit leads cx SA  pain generator cx SA    Imaging:    CT Thoracic Spine: Spinal cord stimulator with leads demonstrated in the posterior epidural  space at the T7 and T8 level.  The leads are intact and unremarkable. Mild multilevel degenerative disc disease without significant canal stenosis or foraminal narrowing. Bilateral atelectasis.           I have personally reviewed the past medical history, past surgical history, medications, social history, and family history, and I haveupdated the database accordingly. Allergies:   Actos [pioglitazone], Lisinopril, Metformin and related, and Zithromax [azithromycin]     Review of Systems:     Review of Systems   Constitutional: Negative for appetite change, chills and fever. HENT: Negative for congestion and sore throat. Eyes: Negative for discharge and redness. Respiratory: Negative for cough, choking and shortness of breath. Cardiovascular: Negative for chest pain. Gastrointestinal: Negative for abdominal distention, abdominal pain, blood in stool, constipation, diarrhea and nausea. Endocrine: Negative for polyphagia. Genitourinary: Negative for dysuria, hematuria and pelvic pain.    Musculoskeletal: Positive for back pain. Skin: Positive for wound. Allergic/Immunologic: Negative for immunocompromised state. Neurological: Negative for light-headedness and headaches. Hematological: Does not bruise/bleed easily. Psychiatric/Behavioral: Negative for agitation. Physical Examination :       Physical Exam  Constitutional:       General: She is not in acute distress. Appearance: Normal appearance. She is obese. She is not toxic-appearing. HENT:      Head: Normocephalic. Nose: Nose normal.      Mouth/Throat:      Mouth: Mucous membranes are moist.   Eyes:      General: No scleral icterus. Conjunctiva/sclera: Conjunctivae normal.   Cardiovascular:      Rate and Rhythm: Normal rate and regular rhythm. Heart sounds: Normal heart sounds. Pulmonary:      Effort: Pulmonary effort is normal. No respiratory distress. Breath sounds: Normal breath sounds. No rhonchi. Abdominal:      Tenderness: There is no abdominal tenderness. There is no guarding. Genitourinary:     Comments: No fleming  Musculoskeletal:      Cervical back: Normal range of motion and neck supple. Right lower leg: No edema. Left lower leg: No edema. Skin:     General: Skin is warm. Coloration: Skin is not jaundiced. Findings: Erythema and wound present. Comments: Midline thoracic patient has swelling and tenderness. Lower left lumbar: area is swollen, red, with yellow/clear discharge. Tender to touch   Neurological:      General: No focal deficit present. Mental Status: She is alert and oriented to person, place, and time. Cranial Nerves: No cranial nerve deficit.    Psychiatric:         Mood and Affect: Mood normal.         Behavior: Behavior normal.         Past Medical History:     Past Medical History:   Diagnosis Date    CAD (coronary artery disease)     \"40 % blockage lower heart\"    Cataracts, bilateral     Chest pain     pressure    Chronic back pain     Depression     [facility]     Diabetic polyneuropathy ([facility]     EKG abnormalities 11/14/2016    RT. BBB    GERD (gastroesophageal reflux disease)     Headache     Hyperlipidemia     Hypertension     Hypothyroidism     Insulin pump in place     Neuropathy     Osteoarthritis     Peripheral vascular disease (HCC)     Pure hypercholesterolemia     Restless legs syndrome     Sleep apnea     Ulcer of lower extremity ([facility]     Wears glasses        Past Surgical  History:     Past Surgical History:   Procedure Laterality Date    BACK SURGERY      cage in place at L5-S1., SPINAL CORD STIMULATOR    CARPAL TUNNEL RELEASE Bilateral     CATARACT REMOVAL WITH IMPLANT Right 05/16/2017    CATARACT REMOVAL WITH IMPLANT Left 06/13/2017    DR SHARON HERRERA    CHOLECYSTECTOMY      COLONOSCOPY  07/13/2004    normal    COLONOSCOPY  09/14/2016    no lesions seen, spasms sigmoid colon    COLONOSCOPY  01/14/2021    DR KARLEY MARTINS-    CYST REMOVAL Right     GANGLION CYST REMOVED.     ENDOSCOPY, COLON, DIAGNOSTIC      EGD    JOINT REPLACEMENT Left     knee    OTHER SURGICAL HISTORY  11/21/2016    Revision of spinal cord stimulator    SPINAL CORD STIMULATOR SURGERY N/A 4/16/2021    SPINAL CORD STIMULATOR  BATTERY REPLACEMENT - ABBOTT  -   dr najera closer please performed by Luis M Parra MD at [address] N/A 6/1/2021    EXPLANT PADDLE SPINAL CORD STIMULATOR, I & D OF WOUND performed by Sen Barnes DO at [address] ENDOSCOPY  08/16/2016    NO LESIONS SEEN    UPPER GASTROINTESTINAL ENDOSCOPY  01/14/2021    ESOPHAGEAL POLYP - DR Celso MARTINS       Medications:      sodium chloride flush  5-40 mL Intravenous 2 times per day    enoxaparin  40 mg Subcutaneous Daily    piperacillin-tazobactam  3,375 mg Intravenous Q8H    vancomycin (VANCOCIN) intermittent dosing (placeholder)   Other RX Placeholder    vancomycin  1,250 mg Intravenous Q24H    atorvastatin  40 mg Oral Nightly    gabapentin  400 mg Oral TID    isosorbide mononitrate  60 mg Oral Daily    metoprolol tartrate  25 mg Oral BID    pantoprazole  40 mg Oral QAM AC    sertraline  100 mg Oral Daily    metoclopramide  5 mg Oral BID    insulin lispro  0-6 Units Subcutaneous TID WC    insulin lispro  0-3 Units Subcutaneous Nightly       Social History:     Social History     Socioeconomic History    Marital status:      Spouse name: Not on file    Number of children: Not on file    Years of education: Not on file    Highest education level: Not on file   Occupational History    Not on file   Tobacco Use    Smoking status: Never Smoker    Smokeless tobacco: Never Used   Vaping Use    Vaping Use: Never used   Substance and Sexual Activity    Alcohol use: No    Drug use: No    Sexual activity: Not on file   Other Topics Concern    Not on file   Social History Narrative    Not on file     Social Determinants of Health     Financial Resource Strain: Low Risk     Difficulty of Paying Living Expenses: Not hard at all   Food Insecurity: No Food Insecurity    Worried About [address] in the Last Year: Never true    [address] in the Last Year: Never true   Transportation Needs:     Lack of Transportation (Medical):      Lack of Transportation (Non-Medical):    Physical Activity:     Days of Exercise per Week:     Minutes of Exercise per Session:    Stress:     Feeling of Stress :    Social Connections:     Frequency of Communication with Friends and Family:     Frequency of Social Gatherings with Friends and Family:     Attends Catholic Services:     Active Member of Clubs or Organizations:     Attends Club or Organization Meetings:     Marital Status:    Intimate Partner Violence:     Fear of Current or Ex-Partner:     Emotionally Abused:     Physically Abused:     Sexually Abused:        Family History:     Family History   Problem Relation Age of Onset    Heart Disease Mother     Dementia Mother     Stroke Mother     Heart Disease Father     COPD Father     Heart Disease Brother     Arthritis Brother     Other Brother         AAA    Other Brother         AAA      Medical Decision Making:   I have independently reviewed/ordered the following labs:    CBC with Differential:   Recent Labs     05/31/21  1500 06/01/21  0619   WBC 9.3 9.5   HGB 10.9* 10.1*   HCT 32.4* 29.5*    171   LYMPHOPCT 14*  --    MONOPCT 10*  --      BMP:  Recent Labs     05/31/21  1500 06/01/21  0619   * 132*   K 3.8 3.7   CL 94* 99   CO2 24 24   BUN 19 14   CREATININE 0.90 0.77   MG 2.1  --      Hepatic Function Panel:   Recent Labs     05/31/21  1500   PROT 7.0   LABALBU 3.8   BILITOT 0.63   ALKPHOS 83   ALT 19   AST 18     No results for input(s): RPR in the last 72 hours. No results for input(s): HIV in the last 72 hours. No results for input(s): BC in the last 72 hours. Lab Results   Component Value Date    CREATININE 0.77 06/01/2021    GLUCOSE 196 06/01/2021    GLUCOSE 310 10/13/2011       Detailed results: Thank you for allowing us to participate in the care of this patient. Please call with questions. This note is created with the assistance of a speech recognition program.  While intending to generate adocument that actually reflects the content of the visit, the document can still have some errors including those of syntax and sound a like substitutions which may escape proof reading. It such instances, actual meaningcan be extrapolated by contextual diversion. Chantal He MD, OMS3  Office: [phone]  Perfect serve / office [phone]    I have discussed the care of the patient, including pertinent history and exam findings,  with the student. I have seen and examined the patient and the key elements of all parts of the encounter have been performed by me.   I agree with the assessment, plan and orders as documented by the student    Kitty Solis, Infectious Diseases

## 2021-06-02 NOTE — PROGRESS NOTES
Occupational Therapy   Occupational Therapy Initial Assessment  Date: 2021   Patient Name: Annie Rahman  MRN: [id number]     : 1955     s/p laminotomy for explantation of paddle spinal cord stimulator 2021    Date of Service: 2021    Discharge Recommendations:    No therapy recommended at discharge. OT Equipment Recommendations  Equipment Needed: No    Assessment   Assessment: Pt agreeable to OT eval this date. Pt demonstrates independence with all functional tasks and states no concerns with return to prior living arrangements. Pt reports no concerns with independently completing ADLs and functional mobility at discharge. No further OT needed at this time  Prognosis: Good  Decision Making: Low Complexity  Patient Education: Pt ed on OT role, OT POC, safety awareness, and to notify RN or MD if concerns arise or OT needs reordered. Pt verbalized good understanding  No Skilled OT: Independent with ADL's;No OT goals identified  REQUIRES OT FOLLOW UP: Yes  Activity Tolerance  Activity Tolerance: Patient Tolerated treatment well  Safety Devices  Safety Devices in place: Yes  Type of devices: Nurse notified; Left in chair;Call light within reach  Restraints  Initially in place: No           Patient Diagnosis(es): There were no encounter diagnoses. has a past medical history of CAD (coronary artery disease), Cataracts, bilateral, Chest pain, Chronic back pain, Depression, Diabetes ([address] Diabetic polyneuropathy ([address] EKG abnormalities, GERD (gastroesophageal reflux disease), Headache, Hyperlipidemia, Hypertension, Hypothyroidism, Insulin pump in place, Neuropathy, Osteoarthritis, Peripheral vascular disease ([address] Pure hypercholesterolemia, Restless legs syndrome, Sleep apnea, Ulcer of lower extremity ([address] and Wears glasses. has a past surgical history that includes Colonoscopy (2004); Carpal tunnel release (Bilateral);  Cholecystectomy; cyst removal (Right); joint replacement (Left); Upper gastrointestinal endoscopy (08/16/2016); Thyroid lobectomy; Colonoscopy (09/14/2016); back surgery; Endoscopy, colon, diagnostic; other surgical history (11/21/2016); Cataract removal with implant (Right, 05/16/2017); Cataract removal with implant (Left, 06/13/2017); Colonoscopy (01/14/2021); Upper gastrointestinal endoscopy (01/14/2021); Spinal Cord Stimulator Surgery (N/A, 4/16/2021); and Spinal Cord Stimulator Surgery (N/A, 6/1/2021). Restrictions  Restrictions/Precautions  Restrictions/Precautions: Up as Tolerated, General Precautions  Required Braces or Orthoses?: No  Position Activity Restriction  Other position/activity restrictions: s/p laminotomy for explantation of paddle spinal cord stimulator; 2 wound vacs; 2 JANETH drains    Subjective   General  Patient assessed for rehabilitation services?: Yes  Family / Caregiver Present: No  General Comment  Comments: RN ok'd pt for OT eval this date. Pt agreeable to session and pleasant/cooperative throughout  Patient Currently in Pain: Yes  Pain Assessment  Pain Assessment: 0-10  Pain Level: 7  Pain Type: Acute pain  Pain Location: Back  Pain Orientation: Lower  Pain Descriptors: Velinda Mantle; Stabbing  Functional Pain Assessment: Activities are not prevented  Non-Pharmaceutical Pain Intervention(s): Ambulation/Increased Activity; Distraction  Response to Pain Intervention: Patient Satisfied  Pre Treatment Pain Screening  Intervention List: Patient able to continue with treatment  Vital Signs  Patient Currently in Pain: Yes     Social/Functional History  Social/Functional History  Lives With: Family, Daughter (son in law, 2 grandchildren)  Type of Home: Trailer  Home Layout: One level  Home Access: Stairs to enter with rails  Entrance Stairs - Number of Steps: 5  Entrance Stairs - Rails: Both  Bathroom Shower/Tub: Walk-in shower, Tub only  Bathroom Toilet: Standard  Home Equipment: Cane, Crutches, Quad cane, Rolling walker  Receives Help From: Family (daughter is home at all times and able to assist as needed)  ADL Assistance: Independent  Homemaking Assistance: Independent  Homemaking Responsibilities: Yes  Ambulation Assistance: Independent  Transfer Assistance: Independent  Active : Yes  Mode of Transportation: [facility]  Occupation: Retired  Type of occupation: , traffic  [address]: spend time with children and grandchildren       Objective   Vision: Impaired  Vision Exceptions: Wears glasses for reading  Hearing: Within functional limits      Balance  Sitting Balance: Independent  Standing Balance: Supervision  Functional Mobility  Functional - Mobility Device: No device  Activity: To/from bathroom  Assist Level: Supervision  Functional Mobility Comments: Pt completed functional mobility within hospital room and to/from bathroom with supervision for safety only.  Pt navigated obstacles appropriately and demo no unsteadiness or LOB  Toilet Transfers  Toilet - Technique: Ambulating  Equipment Used: Standard toilet  Toilet Transfer: Independent     ADL  Feeding: Independent  Grooming: Independent  UE Bathing: Independent  LE Bathing: Independent  UE Dressing: Independent  LE Dressing: Independent (pt doffed and donned B socks independently)  Toileting: Independent (pt completed toilet transfer and clothing management independently this date)  Tone RUE  RUE Tone: Normotonic  Tone LUE  LUE Tone: Normotonic  Coordination  Movements Are Fluid And Coordinated: Yes     Bed mobility  Comment: NEENA-pt in bedside chair at OT entrance and exit  Transfers  Sit to stand: Supervision  Stand to sit: Supervision  Transfer Comments: supervision for safety only     Cognition  Overall Cognitive Status: WFL        Sensation  Overall Sensation Status: WFL (pt denies numbness/tingling at this time)        LUE AROM (degrees)  LUE AROM : WFL  Left Hand AROM (degrees)  Left Hand AROM: WFL  RUE AROM (degrees)  RUE AROM : WFL  Right Hand AROM (degrees)  Right Hand AROM:

## 2021-06-02 NOTE — PLAN OF CARE
Problem: Pain:  Goal: Pain level will decrease  Description: Pain level will decrease  6/2/2021 1819 by Esequiel Banks RN  Outcome: Ongoing  6/2/2021 0621 by Xochitl Carter RN  Outcome: Ongoing  Goal: Control of acute pain  Description: Control of acute pain  6/2/2021 1819 by Esequiel Banks RN  Outcome: Ongoing  6/2/2021 0621 by Xochitl Carter RN  Outcome: Ongoing  Goal: Control of chronic pain  Description: Control of chronic pain  6/2/2021 1819 by Esequiel Banks RN  Outcome: Ongoing  6/2/2021 0621 by Xochitl Carter RN  Outcome: Ongoing

## 2021-06-02 NOTE — PROGRESS NOTES
[facility]  Office: [address], DO, Osiel Bell, DO, Mendez Nixon, DO, Dee Dee Rodriguez Blood, DO, Nataliia Moser MD, Francesco Wright MD, Markos Meza MD, Chad Loomis MD, Felisha Aceves MD, Sheridan Ignacio MD, Mallika Morales MD, Jalen Richardson MD, Uzma Alegria, DO, Rusty Ashford MD, Bradly Dhillon, DO, Kathleen Vides MD,  Nelida Shahid, DO, Solomon Simmons MD, Mitch Chandler MD, Robert Mario MD, Ivette Castellanos MD, [place], Lamin Loomis, CNP, Cristino Meade, CNP, Promise Brantley, CNS, Ravinder Zayas, CNP, Ignacia Culp, CNP, Chuck Fischer, CNP, Stone Flores, CNP, Marika Epps, CNP, Denise Aparicio PA-C, Toya Matute, [facility], Henrique Epps, CNP, Pelon Figueroa, CNP, Nikky Castellanos, CNP, Iva Patel, CNP, Karri Griffith, CNP, Kaela Lopez, [address] [place] [address]    Progress Note    6/2/2021    9:14 AM    Name:   Bruna Bamberger  MRN:     [id number]     Acct:      [de-identified]   Room:   [id number] Day:  2  Admit Date:  5/31/2021  9:16 PM    PCP:   Denice Matos MD  Code Status:  Full Code    Subjective:     C/C:left gluteal infection  Interval History Status: not changed. Patient seen and evaluated in room laying in bed status post spinal stimulator explantation with neurosurgery on 6/1  Continues on Zosyn/Vanco, awaiting culture speciation  Wound VAC x2, 1 to thoracic area 1 to lumbar area, JANETH drain x2 both with bloody output  Patient endorsing back pain at this time    Brief History:     Bruna Bamberger is a 72 y.o. Non-/non  female who presents with wound infection and is admitted to the hospital for the management of infected implant/stimulator.       [facility] with a concern of infection at the site of her spinal cord stimulator.   Patient states that she had her spinal cord stimulator battery replaced 4/16/21 and the site of the surgery is now infected. Patient states that she started to develop redness and swelling at the site and then was seen in the [facility] emergency room and was sent to [place] [address] for further evaluation by the neurosurgery service. Review of Systems:     Constitutional:  negative for chills, fevers, sweats, left gluteal pain at spinal stimulator site   Respiratory:  negative for cough, dyspnea on exertion, shortness of breath, wheezing  Cardiovascular:  negative for chest pain, chest pressure/discomfort, lower extremity edema, palpitations  Gastrointestinal:  negative for abdominal pain, constipation, diarrhea, nausea, vomiting  Neurological:  negative for dizziness, headache    Medications: Allergies:     Allergies   Allergen Reactions    Actos [Pioglitazone] Other (See Comments)     Weight gain    Lisinopril Other (See Comments)     cough    Metformin And Related Diarrhea    Zithromax [Azithromycin] Itching       Current Meds:   Scheduled Meds:    sodium chloride flush  5-40 mL Intravenous 2 times per day    [Held by provider] enoxaparin  40 mg Subcutaneous Daily    piperacillin-tazobactam  3,375 mg Intravenous Q8H    vancomycin (VANCOCIN) intermittent dosing (placeholder)   Other RX Placeholder    vancomycin  1,250 mg Intravenous Q24H    atorvastatin  40 mg Oral Nightly    gabapentin  400 mg Oral TID    isosorbide mononitrate  60 mg Oral Daily    metoprolol tartrate  25 mg Oral BID    pantoprazole  40 mg Oral QAM AC    sertraline  100 mg Oral Daily    metoclopramide  5 mg Oral BID    insulin lispro  0-6 Units Subcutaneous TID WC    insulin lispro  0-3 Units Subcutaneous Nightly     Continuous Infusions:    dextrose      sodium chloride 25 mL (06/01/21 3805)    dextrose       PRN Meds: glucose, dextrose, glucagon (rDNA), dextrose, sodium chloride flush, sodium chloride, potassium chloride **OR** potassium alternative oral replacement **OR** potassium chloride, magnesium --     171  --    MPV 7.5 10.1  --    SEDRATE 33*  --   --    .2*  --   --    INR  --   --  0.9     Chemistry:  Recent Labs     05/31/21  1500 06/01/21  0619   * 132*   K 3.8 3.7   CL 94* 99   CO2 24 24   GLUCOSE 367* 196*   BUN 19 14   CREATININE 0.90 0.77   MG 2.1  --    ANIONGAP 13 9   LABGLOM >60 >60   GFRAA >60 >60   CALCIUM 9.4 8.6     Recent Labs     05/31/21  1500 05/31/21  2337 06/01/21  0916 06/01/21  1558 06/01/21  1931 06/01/21  2351 06/02/21  0809   PROT 7.0  --   --   --   --   --   --    LABALBU 3.8  --   --   --   --   --   --    AST 18  --   --   --   --   --   --    ALT 19  --   --   --   --   --   --    ALKPHOS 83  --   --   --   --   --   --    BILITOT 0.63  --   --   --   --   --   --    POCGLU  --  83 158* 74 158* 198* 176*     ABG:No results found for: POCPH, PHART, PH, POCPCO2, ILC1FZC, PCO2, POCPO2, PO2ART, PO2, POCHCO3, CQD0TGB, HCO3, NBEA, PBEA, BEART, BE, THGBART, THB, RON5TFD, BGWC8KBZ, M0MNXVCX, O2SAT, FIO2  Lab Results   Component Value Date/Time    SPECIAL NOT REPORTED 06/01/2021 07:51 PM     Lab Results   Component Value Date/Time    CULTURE PENDING 06/01/2021 07:51 PM       Radiology:  No results found.     Physical Examination:        General appearance:  alert, cooperative and no distress  Mental Status:  oriented to person, place and time and normal affect  Lungs:  clear to auscultation bilaterally, normal effort  Heart:  regular rate and rhythm, no murmur  Abdomen:  Obese, soft, nontender, nondistended, normal bowel sounds, no masses, hepatomegaly, splenomegaly  Extremities:  no edema, redness, tenderness in the calves  Skin: Area of redness and induration at left gluteal mental stimulator implantation site, insulin pump patent          Assessment:        Hospital Problems         Last Modified POA    * (Principal) Infection of spinal cord stimulator ([facility] 6/1/2021 Yes    Postlaminectomy syndrome, lumbar region 6/1/2021 Yes    CAD (coronary artery disease) 6/1/2021 Yes    Essential hypertension 6/1/2021 Yes    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 Spinal cord stimulator infection neurosurgery consulted. -S/P laminectomy spinal cord stimulator explantation on 6/1 and I & D of thoracic and left flank wounds   -Continue Vanco/Zosyn. - Await culture speciation. 2. Essential hypertension currently stable on home medications    3. Diabetes mellitus type 1 stable  -managed with insulin pump per patient    4. CAD stable without active signs or symptoms.   - Continue home dose of Lipitor, Imdur, Lopressor    5. Depression stable on home dose of Zoloft    6.  GI/DVT prophylaxis Protonix, Lovenox on hold secondary to planned surgical intervention, to restart tomorrow    LUCIE Stewart NP  6/2/2021  9:14 AM

## 2021-06-02 NOTE — PROGRESS NOTES
Noted insulin site good with pump and noted basal of 2.2 units per hour.   Noted between back would areas of the purple foam reddened and warm skin between sites

## 2021-06-02 NOTE — PROGRESS NOTES
Physical Therapy    Facility/Department: [facility]  Initial Assessment    NAME: Angelic Gan  : 1955  MRN: [id number]    Date of Service: 2021  Copied from H+P: Angelic Gan is a 72 y.o. Non-/non  female who presents with wound infection and is admitted to the hospital for the management of infected implant/stimulator.       Patient presents to [facility] emergency room with a concern of infection at the site of her spinal cord stimulator. Patient states that she had her spinal cord stimulator battery replaced 21 and the site of the surgery is now infected. Patient states that she started to develop redness and swelling at the site and then was seen in the [facility] emergency room and was sent to [place] [address] for further evaluation by the neurosurgery service. Pt had surgery for removal of infected spinal cord stimulator 21. Discharge Recommendations:  No therapy recommended at discharge. PT Equipment Recommendations  Equipment Needed: No    Assessment    Pt very pleasant and sitting in bed upon PT arrival. Pt in agreement with PT. Pt IND bed mob, transfers, ambulation, and stairs. Pt is currently safe to go home. Pt educated on importance of sitting up in a chair and ambulating to prevent loss of endurance. Prognosis: Good  Decision Making: Low Complexity  PT Education: Goals;PT Role;Plan of Care  No Skilled PT: Independent with functional mobility   REQUIRES PT FOLLOW UP: No  Activity Tolerance  Activity Tolerance: Patient Tolerated treatment well       Patient Diagnosis(es): There were no encounter diagnoses.      has a past medical history of CAD (coronary artery disease), Cataracts, bilateral, Chest pain, Chronic back pain, Depression, Diabetes ([address] Diabetic polyneuropathy ([address] EKG abnormalities, GERD (gastroesophageal reflux disease), Headache, Hyperlipidemia, Hypertension, Hypothyroidism, Insulin pump in place, Neuropathy, Osteoarthritis, Peripheral vascular disease ([facility] Pure hypercholesterolemia, Restless legs syndrome, Sleep apnea, Ulcer of lower extremity ([facility] [address] and Wears glasses. has a past surgical history that includes Colonoscopy (07/13/2004); Carpal tunnel release (Bilateral); Cholecystectomy; cyst removal (Right); joint replacement (Left); Upper gastrointestinal endoscopy (08/16/2016); Thyroid lobectomy; Colonoscopy (09/14/2016); back surgery; Endoscopy, colon, diagnostic; other surgical history (11/21/2016); Cataract removal with implant (Right, 05/16/2017); Cataract removal with implant (Left, 06/13/2017); Colonoscopy (01/14/2021); Upper gastrointestinal endoscopy (01/14/2021); Spinal Cord Stimulator Surgery (N/A, 4/16/2021); and Spinal Cord Stimulator Surgery (N/A, 6/1/2021). Restrictions  Restrictions/Precautions  Restrictions/Precautions: Up as Tolerated, General Precautions  Required Braces or Orthoses?: No  Vision/Hearing  Vision: Impaired  Vision Exceptions: Wears glasses for reading  Hearing: Within functional limits     Subjective  General  Patient assessed for rehabilitation services?: Yes  Response To Previous Treatment: Not applicable  Family / Caregiver Present: No  Follows Commands: Within Functional Limits  Pain Screening  Patient Currently in Pain: Yes  Pain Assessment  Pain Assessment: 0-10  Pain Level: 9  Pain Type: Surgical pain  Pain Location: Back  Pain Orientation: Left; Lower  Pain Descriptors: Stabbing  Vital Signs  Patient Currently in Pain: Yes  Pre Treatment Pain Screening  Intervention List: Patient able to continue with treatment;Nurse called to administer meds    Orientation  Orientation  Overall Orientation Status: Within Functional Limits  Social/Functional History  Social/Functional History  Lives With: Family, Daughter (son in law, 2 grandchildren)  Type of Home: Trailer  Home Layout: One level  Home Access: Stairs to enter with rails  Entrance Stairs - Number of Steps: G-Code Modifier : CJ (06/02/21 4856)          Goals  Short term goals  Time Frame for Short term goals: 1 visit  Short term goal 1: Evaluate pt's strength, bed mob, transfers, ambulation, and negotiation of stairs. Educate pt on safety precautions and importance of sitting upright  Patient Goals   Patient goals : Go home       Therapy Time   Individual Concurrent Group Co-treatment   Time In 1125         Time Out 9706         Minutes 42         Timed Code Treatment Minutes: [address], SPT   This treatment/evaluation completed by signing SPT. Signing PT agrees with treatment and documentation.

## 2021-06-03 ENCOUNTER — APPOINTMENT (OUTPATIENT)
Dept: MRI IMAGING | Age: 66
DRG: 028 | End: 2021-06-03
Attending: FAMILY MEDICINE
Payer: COMMERCIAL

## 2021-06-03 LAB
ANION GAP SERPL CALCULATED.3IONS-SCNC: 16 MMOL/L (ref 9–17)
BUN BLDV-MCNC: 8 MG/DL (ref 8–23)
BUN/CREAT BLD: ABNORMAL (ref 9–20)
CALCIUM SERPL-MCNC: 8.9 MG/DL (ref 8.6–10.4)
CHLORIDE BLD-SCNC: 99 MMOL/L (ref 98–107)
CO2: 22 MMOL/L (ref 20–31)
CREAT SERPL-MCNC: 0.61 MG/DL (ref 0.5–0.9)
CULTURE: ABNORMAL
CULTURE: ABNORMAL
DIRECT EXAM: ABNORMAL
DIRECT EXAM: ABNORMAL
GFR AFRICAN AMERICAN: >60 ML/MIN
GFR NON-AFRICAN AMERICAN: >60 ML/MIN
GFR SERPL CREATININE-BSD FRML MDRD: ABNORMAL ML/MIN/{1.73_M2}
GFR SERPL CREATININE-BSD FRML MDRD: ABNORMAL ML/MIN/{1.73_M2}
GLUCOSE BLD-MCNC: 162 MG/DL (ref 65–105)
GLUCOSE BLD-MCNC: 164 MG/DL (ref 70–99)
GLUCOSE BLD-MCNC: 165 MG/DL (ref 65–105)
GLUCOSE BLD-MCNC: 225 MG/DL (ref 65–105)
GLUCOSE BLD-MCNC: 51 MG/DL (ref 65–105)
HCT VFR BLD CALC: 33.6 % (ref 36.3–47.1)
HEMOGLOBIN: 10.4 G/DL (ref 11.9–15.1)
Lab: ABNORMAL
MAGNESIUM: 2.3 MG/DL (ref 1.6–2.6)
MCH RBC QN AUTO: 28.5 PG (ref 25.2–33.5)
MCHC RBC AUTO-ENTMCNC: 31 G/DL (ref 28.4–34.8)
MCV RBC AUTO: 92.1 FL (ref 82.6–102.9)
NRBC AUTOMATED: 0 PER 100 WBC
PDW BLD-RTO: 13.1 % (ref 11.8–14.4)
PLATELET # BLD: 274 K/UL (ref 138–453)
PMV BLD AUTO: 10.3 FL (ref 8.1–13.5)
POTASSIUM SERPL-SCNC: 3.4 MMOL/L (ref 3.7–5.3)
RBC # BLD: 3.65 M/UL (ref 3.95–5.11)
SODIUM BLD-SCNC: 137 MMOL/L (ref 135–144)
SPECIMEN DESCRIPTION: ABNORMAL
THYROXINE, FREE: 2.2 NG/DL (ref 0.93–1.7)
TSH SERPL DL<=0.05 MIU/L-ACNC: 6.08 MIU/L (ref 0.3–5)
VANCOMYCIN TROUGH DATE LAST DOSE: ABNORMAL
VANCOMYCIN TROUGH DOSE AMOUNT: ABNORMAL
VANCOMYCIN TROUGH TIME LAST DOSE: ABNORMAL
VANCOMYCIN TROUGH: 9.7 UG/ML (ref 10–20)
WBC # BLD: 10.8 K/UL (ref 3.5–11.3)

## 2021-06-03 PROCEDURE — 6360000002 HC RX W HCPCS: Performed by: INTERNAL MEDICINE

## 2021-06-03 PROCEDURE — 84439 ASSAY OF FREE THYROXINE: CPT

## 2021-06-03 PROCEDURE — 80202 ASSAY OF VANCOMYCIN: CPT

## 2021-06-03 PROCEDURE — 6360000002 HC RX W HCPCS: Performed by: STUDENT IN AN ORGANIZED HEALTH CARE EDUCATION/TRAINING PROGRAM

## 2021-06-03 PROCEDURE — 80048 BASIC METABOLIC PNL TOTAL CA: CPT

## 2021-06-03 PROCEDURE — 6360000002 HC RX W HCPCS: Performed by: FAMILY MEDICINE

## 2021-06-03 PROCEDURE — 99232 SBSQ HOSP IP/OBS MODERATE 35: CPT | Performed by: FAMILY MEDICINE

## 2021-06-03 PROCEDURE — 72157 MRI CHEST SPINE W/O & W/DYE: CPT

## 2021-06-03 PROCEDURE — 6370000000 HC RX 637 (ALT 250 FOR IP): Performed by: STUDENT IN AN ORGANIZED HEALTH CARE EDUCATION/TRAINING PROGRAM

## 2021-06-03 PROCEDURE — 85027 COMPLETE CBC AUTOMATED: CPT

## 2021-06-03 PROCEDURE — APPSS45 APP SPLIT SHARED TIME 31-45 MINUTES: Performed by: NURSE PRACTITIONER

## 2021-06-03 PROCEDURE — 36415 COLL VENOUS BLD VENIPUNCTURE: CPT

## 2021-06-03 PROCEDURE — 2580000003 HC RX 258: Performed by: STUDENT IN AN ORGANIZED HEALTH CARE EDUCATION/TRAINING PROGRAM

## 2021-06-03 PROCEDURE — 6360000004 HC RX CONTRAST MEDICATION: Performed by: INTERNAL MEDICINE

## 2021-06-03 PROCEDURE — 6370000000 HC RX 637 (ALT 250 FOR IP): Performed by: NURSE PRACTITIONER

## 2021-06-03 PROCEDURE — A9579 GAD-BASE MR CONTRAST NOS,1ML: HCPCS | Performed by: INTERNAL MEDICINE

## 2021-06-03 PROCEDURE — 84443 ASSAY THYROID STIM HORMONE: CPT

## 2021-06-03 PROCEDURE — 2580000003 HC RX 258: Performed by: FAMILY MEDICINE

## 2021-06-03 PROCEDURE — 1200000000 HC SEMI PRIVATE

## 2021-06-03 PROCEDURE — 2580000003 HC RX 258: Performed by: INTERNAL MEDICINE

## 2021-06-03 PROCEDURE — 99232 SBSQ HOSP IP/OBS MODERATE 35: CPT | Performed by: INTERNAL MEDICINE

## 2021-06-03 PROCEDURE — 76937 US GUIDE VASCULAR ACCESS: CPT

## 2021-06-03 PROCEDURE — 83735 ASSAY OF MAGNESIUM: CPT

## 2021-06-03 PROCEDURE — 82947 ASSAY GLUCOSE BLOOD QUANT: CPT

## 2021-06-03 PROCEDURE — 97535 SELF CARE MNGMENT TRAINING: CPT

## 2021-06-03 PROCEDURE — C1751 CATH, INF, PER/CENT/MIDLINE: HCPCS

## 2021-06-03 PROCEDURE — 99024 POSTOP FOLLOW-UP VISIT: CPT | Performed by: NEUROLOGICAL SURGERY

## 2021-06-03 RX ORDER — SODIUM CHLORIDE 0.9 % (FLUSH) 0.9 %
10 SYRINGE (ML) INJECTION PRN
Status: DISCONTINUED | OUTPATIENT
Start: 2021-06-03 | End: 2021-06-05 | Stop reason: HOSPADM

## 2021-06-03 RX ORDER — FOLIC ACID 1 MG/1
1 TABLET ORAL DAILY
Status: DISCONTINUED | OUTPATIENT
Start: 2021-06-03 | End: 2021-06-05 | Stop reason: HOSPADM

## 2021-06-03 RX ORDER — LEVOTHYROXINE SODIUM 88 UG/1
88 TABLET ORAL DAILY
Status: DISCONTINUED | OUTPATIENT
Start: 2021-06-03 | End: 2021-06-05 | Stop reason: HOSPADM

## 2021-06-03 RX ORDER — ASPIRIN 81 MG/1
81 TABLET, CHEWABLE ORAL DAILY
Status: DISCONTINUED | OUTPATIENT
Start: 2021-06-03 | End: 2021-06-05 | Stop reason: HOSPADM

## 2021-06-03 RX ADMIN — GABAPENTIN 400 MG: 400 CAPSULE ORAL at 21:31

## 2021-06-03 RX ADMIN — DEXTROSE MONOHYDRATE 2000 MG: 50 INJECTION, SOLUTION INTRAVENOUS at 17:29

## 2021-06-03 RX ADMIN — METOCLOPRAMIDE 5 MG: 5 TABLET ORAL at 08:47

## 2021-06-03 RX ADMIN — SERTRALINE 100 MG: 50 TABLET, FILM COATED ORAL at 08:47

## 2021-06-03 RX ADMIN — DESMOPRESSIN ACETATE 40 MG: 0.2 TABLET ORAL at 21:21

## 2021-06-03 RX ADMIN — VANCOMYCIN HYDROCHLORIDE 1250 MG: 10 INJECTION, POWDER, LYOPHILIZED, FOR SOLUTION INTRAVENOUS at 12:11

## 2021-06-03 RX ADMIN — ENOXAPARIN SODIUM 40 MG: 40 INJECTION, SOLUTION INTRAVENOUS; SUBCUTANEOUS at 08:47

## 2021-06-03 RX ADMIN — PIPERACILLIN AND TAZOBACTAM 3375 MG: 3; .375 INJECTION, POWDER, LYOPHILIZED, FOR SOLUTION INTRAVENOUS at 06:04

## 2021-06-03 RX ADMIN — GADOTERIDOL 18 ML: 279.3 INJECTION, SOLUTION INTRAVENOUS at 19:05

## 2021-06-03 RX ADMIN — OXYCODONE HYDROCHLORIDE AND ACETAMINOPHEN 1 TABLET: 5; 325 TABLET ORAL at 21:20

## 2021-06-03 RX ADMIN — METOPROLOL TARTRATE 25 MG: 25 TABLET ORAL at 21:20

## 2021-06-03 RX ADMIN — ASPIRIN 81 MG: 81 TABLET, CHEWABLE ORAL at 10:36

## 2021-06-03 RX ADMIN — ISOSORBIDE MONONITRATE 60 MG: 60 TABLET ORAL at 08:47

## 2021-06-03 RX ADMIN — FOLIC ACID 1 MG: 1 TABLET ORAL at 10:36

## 2021-06-03 RX ADMIN — OXYCODONE HYDROCHLORIDE AND ACETAMINOPHEN 1 TABLET: 5; 325 TABLET ORAL at 11:42

## 2021-06-03 RX ADMIN — PANTOPRAZOLE SODIUM 40 MG: 40 TABLET, DELAYED RELEASE ORAL at 10:36

## 2021-06-03 RX ADMIN — SODIUM CHLORIDE, PRESERVATIVE FREE 10 ML: 5 INJECTION INTRAVENOUS at 21:21

## 2021-06-03 RX ADMIN — METOCLOPRAMIDE 5 MG: 5 TABLET ORAL at 21:21

## 2021-06-03 RX ADMIN — GABAPENTIN 400 MG: 400 CAPSULE ORAL at 10:36

## 2021-06-03 RX ADMIN — GABAPENTIN 400 MG: 400 CAPSULE ORAL at 17:29

## 2021-06-03 RX ADMIN — LEVOTHYROXINE SODIUM 88 MCG: 88 TABLET ORAL at 10:36

## 2021-06-03 ASSESSMENT — ENCOUNTER SYMPTOMS
ABDOMINAL PAIN: 0
CONSTIPATION: 0
PHOTOPHOBIA: 0
NAUSEA: 0
SORE THROAT: 0
BACK PAIN: 0
DIARRHEA: 0
RHINORRHEA: 0
TROUBLE SWALLOWING: 0
CHOKING: 0
VOMITING: 0
EYE DISCHARGE: 0
COUGH: 0
BLOOD IN STOOL: 0
SHORTNESS OF BREATH: 0
EYE REDNESS: 0

## 2021-06-03 ASSESSMENT — PAIN DESCRIPTION - PAIN TYPE
TYPE: SURGICAL PAIN
TYPE: ACUTE PAIN

## 2021-06-03 ASSESSMENT — PAIN DESCRIPTION - ONSET: ONSET: ON-GOING

## 2021-06-03 ASSESSMENT — PAIN SCALES - GENERAL
PAINLEVEL_OUTOF10: 4
PAINLEVEL_OUTOF10: 5
PAINLEVEL_OUTOF10: 8
PAINLEVEL_OUTOF10: 9

## 2021-06-03 ASSESSMENT — PAIN DESCRIPTION - DESCRIPTORS
DESCRIPTORS: ACHING
DESCRIPTORS: ACHING;CONSTANT;DISCOMFORT

## 2021-06-03 ASSESSMENT — PAIN DESCRIPTION - PROGRESSION
CLINICAL_PROGRESSION: NOT CHANGED
CLINICAL_PROGRESSION: NOT CHANGED

## 2021-06-03 ASSESSMENT — PAIN DESCRIPTION - FREQUENCY
FREQUENCY: CONTINUOUS
FREQUENCY: CONTINUOUS

## 2021-06-03 ASSESSMENT — PAIN - FUNCTIONAL ASSESSMENT: PAIN_FUNCTIONAL_ASSESSMENT: ACTIVITIES ARE NOT PREVENTED

## 2021-06-03 ASSESSMENT — PAIN DESCRIPTION - ORIENTATION
ORIENTATION: LOWER
ORIENTATION: LEFT;LOWER;MID;UPPER

## 2021-06-03 ASSESSMENT — PAIN DESCRIPTION - LOCATION
LOCATION: BACK
LOCATION: BACK

## 2021-06-03 NOTE — PLAN OF CARE
Problem: Pain:  Goal: Pain level will decrease  Description: Pain level will decrease  6/3/2021 1924 by Eden Richmond RN  Outcome: Ongoing  6/3/2021 0633 by April Thornton RN  Outcome: Ongoing  Goal: Control of acute pain  Description: Control of acute pain  6/3/2021 1924 by Eden Richmond RN  Outcome: Ongoing  6/3/2021 0633 by April Thornton RN  Outcome: Ongoing  Goal: Control of chronic pain  Description: Control of chronic pain  6/3/2021 1924 by Eden Richmond RN  Outcome: Ongoing  6/3/2021 0633 by April Thornton RN  Outcome: Ongoing

## 2021-06-03 NOTE — PROGRESS NOTES
Progress Note - Neurosurgery    Chief complaint wound drainage and back pain. Subjective:  Maria Richardson is a 72 y.o. female. Patient complains of of pain locatedin wounds. Pain is controlled. Review of Systems    Objective:  Blood pressure (!) 124/50, pulse 70, temperature 97.5 °F (36.4 °C), temperature source Oral, resp. rate 16, height 5' 2\" (1.575 m), weight 208 lb (94.3 kg), SpO2 98 %, not currently breastfeeding. Physical Exam   Rate. Normoxic. Equal chest rise and fall. Tender nondistended abdomen. Equal pulses. Neurologic Exam   5 all ext. no sensory deficits present.   Labs:  Admission on 05/31/2021, Discharged on 05/31/2021   Component Date Value Ref Range Status    WBC 05/31/2021 9.3  3.5 - 11.0 k/uL Final    RBC 05/31/2021 3.76* 4.0 - 5.2 m/uL Final    Hemoglobin 05/31/2021 10.9* 12.0 - 16.0 g/dL Final    Hematocrit 05/31/2021 32.4* 36 - 46 % Final    MCV 05/31/2021 86.2  80 - 100 fL Final    MCH 05/31/2021 28.9  26 - 34 pg Final    MCHC 05/31/2021 33.5  31 - 37 g/dL Final    RDW 05/31/2021 13.4  11.5 - 14.9 % Final    Platelets 35/82/9349 181  150 - 450 k/uL Final    MPV 05/31/2021 7.5  6.0 - 12.0 fL Final    NRBC Automated 05/31/2021 NOT REPORTED  per 100 WBC Final    Differential Type 05/31/2021 NOT REPORTED   Final    Seg Neutrophils 05/31/2021 74* 36 - 66 % Final    Lymphocytes 05/31/2021 14* 24 - 44 % Final    Monocytes 05/31/2021 10* 1 - 7 % Final    Eosinophils % 05/31/2021 1  0 - 4 % Final    Basophils 05/31/2021 1  0 - 2 % Final    Immature Granulocytes 05/31/2021 NOT REPORTED  0 % Final    Segs Absolute 05/31/2021 6.90  1.3 - 9.1 k/uL Final    Absolute Lymph # 05/31/2021 1.30  1.0 - 4.8 k/uL Final    Absolute Mono # 05/31/2021 1.00  0.1 - 1.3 k/uL Final    Absolute Eos # 05/31/2021 0.10  0.0 - 0.4 k/uL Final    Basophils Absolute 05/31/2021 0.00  0.0 - 0.2 k/uL Final    Absolute Immature Granulocyte 05/31/2021 NOT REPORTED  0.00 - 0.30 k/uL Final    WBC Morphology 05/31/2021 NOT REPORTED   Final    RBC Morphology 05/31/2021 NOT REPORTED   Final    Platelet Estimate 89/94/2183 NOT REPORTED   Final    Glucose 05/31/2021 367* 70 - 99 mg/dL Final    BUN 05/31/2021 19  8 - 23 mg/dL Final    CREATININE 05/31/2021 0.90  0.50 - 0.90 mg/dL Final    Bun/Cre Ratio 05/31/2021 NOT REPORTED  9 - 20 Final    Calcium 05/31/2021 9.4  8.6 - 10.4 mg/dL Final    Sodium 05/31/2021 131* 135 - 144 mmol/L Final    Potassium 05/31/2021 3.8  3.7 - 5.3 mmol/L Final    Chloride 05/31/2021 94* 98 - 107 mmol/L Final    CO2 05/31/2021 24  20 - 31 mmol/L Final    Anion Gap 05/31/2021 13  9 - 17 mmol/L Final    Alkaline Phosphatase 05/31/2021 83  35 - 104 U/L Final    ALT 05/31/2021 19  5 - 33 U/L Final    AST 05/31/2021 18  <32 U/L Final    Total Bilirubin 05/31/2021 0.63  0.3 - 1.2 mg/dL Final    Total Protein 05/31/2021 7.0  6.4 - 8.3 g/dL Final    Albumin 05/31/2021 3.8  3.5 - 5.2 g/dL Final    Albumin/Globulin Ratio 05/31/2021 NOT REPORTED  1.0 - 2.5 Final    GFR Non- 05/31/2021 >60  >60 mL/min Final    GFR  05/31/2021 >60  >60 mL/min Final    GFR Comment 05/31/2021        Final    Comment: Average GFR for 61-76 years old:   80 mL/min/1.73sq m  Chronic Kidney Disease:   <60 mL/min/1.73sq m  Kidney failure:   <15 mL/min/1.73sq m              eGFR calculated using average adult body mass. Additional eGFR calculator available at:        [email or web address]            GFR Staging 05/31/2021 NOT REPORTED   Final    Specimen Description 05/31/2021 . BLOOD   Preliminary    Special Requests 05/31/2021 2CC ORG 8CC GRN LT HAND   Preliminary    Culture 05/31/2021 NO GROWTH 3 DAYS   Preliminary    Specimen Description 05/31/2021 . BLOOD 10ML EACH L AC   Preliminary    Special Requests 05/31/2021 NOT REPORTED   Preliminary    Culture 05/31/2021 NO GROWTH 3 DAYS   Preliminary    Magnesium 05/31/2021 2.1  1.6 - 2.6 mg/dL Final    CRP 05/31/2021 168.2* 0.0 - 5.0 mg/L Final    Sed Rate 05/31/2021 33* 0 - 30 mm Final    Beta-Hydroxybutyrate 05/31/2021 0.05  0.02 - 0.27 mmol/L Final    Lactic Acid, Sepsis 05/31/2021 2.6* 0.5 - 1.9 mmol/L Final    Lactic Acid, Sepsis, Whole Blood 05/31/2021 NOT REPORTED  0.5 - 1.9 mmol/L Final    Lactic Acid, Sepsis 05/31/2021 1.3  0.5 - 1.9 mmol/L Final    Lactic Acid, Sepsis, Whole Blood 05/31/2021 NOT REPORTED  0.5 - 1.9 mmol/L Final     No results found for this or any previous visit. No results found for this or any previous visit. No results found for this or any previous visit. No results found for this or any previous visit. Assessment and Plan:    Spinal cord stimulator infection status post explantation    Maintain both wound drains and Prevena. Antibiotics per infectious disease.   Await mri T abhijit Thayer DO  6/3/2021

## 2021-06-03 NOTE — PROGRESS NOTES
Pharmacy Vancomycin Consult     Vancomycin Day: 4  Current Dosin mg every 24 hours   Current indication: Spinal Wound infection     Temp max:  97.4    Recent Labs     21  0619 21  0945   BUN 14 8   CREATININE 0.77 0.61   WBC 9.5 10.8     No intake or output data in the 24 hours ending 21 1102  Culture Date      Source                       Results  See micro    Ht Readings from Last 1 Encounters:   21 5' 2\" (1.575 m)        Wt Readings from Last 1 Encounters:   21 208 lb (94.3 kg)       Body mass index is 38.04 kg/m². Estimated Creatinine Clearance: 98 mL/min (based on SCr of 0.61 mg/dL). Trough: 9.5    Assessment/Plan:  Patient received 2000 mg once , then 1000 mg , then 1250 mg  once. Will increase dose to 1250 mg every 12 hours. Predicted AUC/TIMOTHY 559.  Current AUC/TIMOTHY is 279, suboptimal.     Thank you for your consult  Will continue to follow    Vinita Johnson PharmD, BCPS  6/3 11:06 am

## 2021-06-03 NOTE — PROGRESS NOTES
[facility] -   Infectious diseases evaluation  admission date 5/31/2021    reason for consultation:   Infection of spinal cord stimulator    Impression :   Current:  · Infection of spinal cord stimulator  · 6/1 explant stimulator and wash out  · Cultures growing S. Aureus MSSA, no bacteremia  · suspect T spine osteomyelitis / infection   · Elevated CRP: 168.2  · Elevated lactate: 2.6  · Resolved 1.3    Other:  Diabetes type 2  Allergy zithromax - itching , no rash    Discussion / summary of stay / plan of care   · 4/16 Spinal cord stimulator placed, leads in the T spine Dr Maria Teresa Costa anesthesia  · 5/31 Erythema, warmth, swelling, pain, and purulent discharge from site  · T spine sx site red tender- CT T spine no tenderness  · Explant stimulator and wash out 6/1  Recommendations   · Stop vanco and stat ancef 2 g q8   · Possible Concerns for T spine OM -get MRI of the thoracic spine to better evaluate the extent of the infection  · Length of tx depends on the presence of OM, 4-6 weeks    Infection Control Recommendations   · [place] Precautions    Antimicrobial Stewardship Recommendations   · Simplification of therapy  · Targeted therapy  · Per Kg dosing  Coordination ofOutpatient Care:   · Estimated Length of IV antimicrobials:  · Patient will need Midline / picc Catheter Insertion:   · Patient will need SNF:  · Patient will need outpatient wound care:     History of Present Illness:   Initial history:  Edith Meeks is a 72y.o.-year-old female presented to the ED 5/31 for wound infection. Patient recently had a back stimulator placed by Dr. Jamila Sorto 4/16/21. She states she went for a battery change yesterday and her pain doctor sent her here for erythema, warmth, swelling, and some purulent discharge from the site. Patient complains of pain and tenderness in her low back. She states she is also having issues of getting around due to the pain.  Patient also has an insulin pump.Scheduled for surgery today to explant stimulator and wash out    6/1 on exam the T spine site is red and tender - the pacer site is bulging very tender and red -no fever associated    Interval changes  6/3/2021   Patient Vitals for the past 8 hrs:   Weight   06/03/21 0600 208 lb (94.3 kg)       went for surgery 6/1 and had the pain pump removed, pus was found at the tip of the our wires in the spinal area. Pus was also found in the pocket of the pain pump    6/1 OR cultures are growing MSSA  [facility] 5/31 remain neg    Disc w NS, no great suspicion for deep spinal infection    No fevers or chills   No nausea/vomiting- appetite is normal  Back pain better- wound vacs in place   Waiting on MRI and wound cultures (growing SA)    Summary of relevant labs:  Labs:  WBC: 9.5   Creat 0.77  Elevated CRP: 168.2  Elevated lactate: 2.6--> 1.3      Micro:  BC 5/31 neg  Spinal pain unit leads cx MSSA  pain generator cx MSSA    Imaging:  CT Thoracic Spine: Spinal cord stimulator with leads demonstrated in the posterior epidural  space at the T7 and T8 level.  The leads are intact and unremarkable. Mild multilevel degenerative disc disease without significant canal stenosis or foraminal narrowing. Bilateral atelectasis.           I have personally reviewed the past medical history, past surgical history, medications, social history, and family history, and I haveupdated the database accordingly. Allergies:   Actos [pioglitazone], Lisinopril, Metformin and related, and Zithromax [azithromycin]     Review of Systems:     Review of Systems   Constitutional: Negative for appetite change, chills, fatigue and fever. HENT: Negative for congestion, rhinorrhea, sore throat and trouble swallowing. Eyes: Negative for photophobia, discharge and redness. Respiratory: Negative for cough, choking and shortness of breath. Cardiovascular: Negative for chest pain and leg swelling.    Gastrointestinal: Negative for abdominal pain, blood in stool, constipation, diarrhea, nausea and vomiting. Endocrine: Negative for polyphagia. Genitourinary: Negative for difficulty urinating, dysuria and hematuria. Musculoskeletal: Negative for back pain. Skin: Positive for wound. Allergic/Immunologic: Negative for immunocompromised state. Neurological: Negative for tremors, weakness and headaches. Hematological: Does not bruise/bleed easily. Psychiatric/Behavioral: Negative for agitation. Physical Examination :       Physical Exam  Constitutional:       General: She is not in acute distress. Appearance: Normal appearance. She is obese. She is not ill-appearing or toxic-appearing. HENT:      Head: Normocephalic. Nose: Nose normal. No congestion or rhinorrhea. Mouth/Throat:      Mouth: Mucous membranes are moist.   Eyes:      General: No scleral icterus. Conjunctiva/sclera: Conjunctivae normal.   Cardiovascular:      Rate and Rhythm: Normal rate and regular rhythm. Heart sounds: Normal heart sounds. No murmur heard. Pulmonary:      Effort: Pulmonary effort is normal. No respiratory distress. Breath sounds: Normal breath sounds. No wheezing. Abdominal:      General: There is no distension. Tenderness: There is no abdominal tenderness. There is no guarding. Genitourinary:     Comments: No fleming  Musculoskeletal:      Cervical back: Normal range of motion and neck supple. Right lower leg: No edema. Left lower leg: No edema. Skin:     General: Skin is warm. Coloration: Skin is not jaundiced. Findings: Erythema and wound present. No bruising. Comments: Midline thoracic and left lumbar wound vacs in place. Neurological:      General: No focal deficit present. Mental Status: She is alert and oriented to person, place, and time.    Psychiatric:         Mood and Affect: Mood normal.         Behavior: Behavior normal.         Past Medical History:     Past Medical History: Diagnosis Date    CAD (coronary artery disease)     \"40 % blockage lower heart\"    Cataracts, bilateral     Chest pain     pressure    Chronic back pain     Depression     Diabetes ([address]     Diabetic polyneuropathy ([address]     EKG abnormalities 11/14/2016    RT. BBB    GERD (gastroesophageal reflux disease)     Headache     Hyperlipidemia     Hypertension     Hypothyroidism     Insulin pump in place     Neuropathy     Osteoarthritis     Peripheral vascular disease (HCC)     Pure hypercholesterolemia     Restless legs syndrome     Sleep apnea     Ulcer of lower extremity [facility]     Wears glasses        Past Surgical  History:     Past Surgical History:   Procedure Laterality Date    BACK SURGERY      cage in place at L5-S1., SPINAL CORD STIMULATOR    CARPAL TUNNEL RELEASE Bilateral     CATARACT REMOVAL WITH IMPLANT Right 05/16/2017    CATARACT REMOVAL WITH IMPLANT Left 06/13/2017    DR SHARON HERRERA    CHOLECYSTECTOMY      COLONOSCOPY  07/13/2004    normal    COLONOSCOPY  09/14/2016    no lesions seen, spasms sigmoid colon    COLONOSCOPY  01/14/2021    DR KARLEY MARTINS-    CYST REMOVAL Right     GANGLION CYST REMOVED.     ENDOSCOPY, COLON, DIAGNOSTIC      EGD    JOINT REPLACEMENT Left     knee    OTHER SURGICAL HISTORY  11/21/2016    Revision of spinal cord stimulator    SPINAL CORD STIMULATOR SURGERY N/A 4/16/2021    SPINAL CORD STIMULATOR  BATTERY REPLACEMENT - ABBOTT  -   dr reinaldo huang please performed by Laura Lema MD at [address] N/A 6/1/2021    EXPLANT PADDLE SPINAL CORD STIMULATOR, I & D OF WOUND performed by Burgess Claudia DO at [address] ENDOSCOPY  08/16/2016    NO LESIONS SEEN    UPPER GASTROINTESTINAL ENDOSCOPY  01/14/2021    ESOPHAGEAL POLYP - DR Rex MARTINS       Medications:      sodium chloride flush  5-40 mL Intravenous 2 times per day    enoxaparin  40 mg Subcutaneous Daily    piperacillin-tazobactam  3,375 mg Intravenous Q8H    vancomycin (VANCOCIN) intermittent dosing (placeholder)   Other RX Placeholder    vancomycin  1,250 mg Intravenous Q24H    atorvastatin  40 mg Oral Nightly    gabapentin  400 mg Oral TID    isosorbide mononitrate  60 mg Oral Daily    metoprolol tartrate  25 mg Oral BID    pantoprazole  40 mg Oral QAM AC    sertraline  100 mg Oral Daily    metoclopramide  5 mg Oral BID    insulin lispro  0-6 Units Subcutaneous TID WC    insulin lispro  0-3 Units Subcutaneous Nightly       Social History:     Social History     Socioeconomic History    Marital status:      Spouse name: Not on file    Number of children: Not on file    Years of education: Not on file    Highest education level: Not on file   Occupational History    Not on file   Tobacco Use    Smoking status: Never Smoker    Smokeless tobacco: Never Used   Vaping Use    Vaping Use: Never used   Substance and Sexual Activity    Alcohol use: No    Drug use: No    Sexual activity: Not on file   Other Topics Concern    Not on file   Social History Narrative    Not on file     Social Determinants of Health     Financial Resource Strain: Low Risk     Difficulty of Paying Living Expenses: Not hard at all   Food Insecurity: No Food Insecurity    Worried About [facility] in the Last Year: Never true    [facility] in the Last Year: Never true   Transportation Needs:     Lack of Transportation (Medical):      Lack of Transportation (Non-Medical):    Physical Activity:     Days of Exercise per Week:     Minutes of Exercise per Session:    Stress:     Feeling of Stress :    Social Connections:     Frequency of Communication with Friends and Family:     Frequency of Social Gatherings with Friends and Family:     Attends [employer] Services:     Active Member of Clubs or Organizations:     Attends Club or Organization Meetings:     Marital Status:    Intimate Partner Violence:     Fear of Current or Ex-Partner:     Emotionally Abused:     Physically Abused:     Sexually Abused:        Family History:     Family History   Problem Relation Age of Onset    Heart Disease Mother     Dementia Mother     Stroke Mother     Heart Disease Father     COPD Father     Heart Disease Brother     Arthritis Brother     Other Brother         AAA    Other Brother         AAA      Medical Decision Making:   I have independently reviewed/ordered the following labs:    CBC with Differential:   Recent Labs     05/31/21  1500 06/01/21  0619   WBC 9.3 9.5   HGB 10.9* 10.1*   HCT 32.4* 29.5*    171   LYMPHOPCT 14*  --    MONOPCT 10*  --      BMP:  Recent Labs     05/31/21  1500 06/01/21  0619   * 132*   K 3.8 3.7   CL 94* 99   CO2 24 24   BUN 19 14   CREATININE 0.90 0.77   MG 2.1  --      Hepatic Function Panel:   Recent Labs     05/31/21  1500   PROT 7.0   LABALBU 3.8   BILITOT 0.63   ALKPHOS 83   ALT 19   AST 18     No results for input(s): RPR in the last 72 hours. No results for input(s): HIV in the last 72 hours. No results for input(s): BC in the last 72 hours. Lab Results   Component Value Date    CREATININE 0.77 06/01/2021    GLUCOSE 196 06/01/2021    GLUCOSE 310 10/13/2011       Detailed results: Thank you for allowing us to participate in the care of this patient. Please call with questions. This note is created with the assistance of a speech recognition program.  While intending to generate adocument that actually reflects the content of the visit, the document can still have some errors including those of syntax and sound a like substitutions which may escape proof reading. It such instances, actual meaningcan be extrapolated by contextual diversion. Evelio John, [facility]  Office: [phone]  Perfect serve / office [phone]    I have discussed the care of the patient, including pertinent history and exam findings,  with the student.  I have seen and examined the patient and the key elements of all parts of the encounter have been performed by me.   I agree with the assessment, plan and orders as documented by the student    Kitty Irma, Infectious Diseases

## 2021-06-03 NOTE — PROGRESS NOTES
[facility]  Office: [address], DO, Miryam Lo, DO, Lindaarmand Perez, DO, Delores Wilkins Blood, DO, Humberto Booth MD, Arianna Velez MD, Gaston Zavala MD, Yoel Rutledge MD, Pao Rock MD, Samra Sanz MD, Tegan Miller MD, Doug Alexandra MD, Sabrina Bryan, DO, Dony Teague MD, Mary Lou Negrete, DO, Harrietta Goldberg, MD,  Kemal Multani, DO, Nikky White MD, Jeremias Rubin MD, Mariana Alvarado MD, Sherine Meraz MD, Jay Patient, Luis Chacko, CNP, Marla Bernard, CNP, Idania Khan, CNS, Palmira Mcgill, CNP, Rocky Madden, CNP, Fadumo Duran, CNP, Kelli Johnson, CNP, Karri Kc, CNP, ERUM HammC, Betty Hoskins, [facility], Rocio Pittman, CNP, Cullen Guthrie, CNP, Samuel Obrien, CNP, Karyna Lew, CNP, Karel Plush, CNP, Lina Curtis, [address] [place] [address]    Progress Note    6/3/2021    8:22 AM    Name:   Sarah Black  MRN:     [id number]     [place]:      [de-identified]   Room:   5486/9853-05   Day:  3  Admit Date:  5/31/2021  9:16 PM    PCP:   Martha Leong MD  Code Status:  Full Code    Subjective:     C/C:left gluteal infection  Interval History Status: not changed. Patient evaluated in room lying in bed in no acute distress   Afebrile, vital signs stable   Wound VAC x2, 1 to thoracic area 1 to lumbar area, JANETH drain x2 both with bloody output  Staph aureus on surgical cultures  Blood cultures x2 NGTD      Brief History:     Sarah Black is a 72 y.o. Non-/non  female who presents with wound infection and is admitted to the hospital for the management of infected implant/stimulator.       Patient [facility] to [facility] emergency room with a concern of infection at the site of her spinal cord stimulator. Patient states that she had her spinal cord stimulator battery replaced 4/16/21 and the site of the surgery is now infected.   Patient states that she started to develop redness and swelling at the site and then was seen in the [facility] emergency room and was sent to [place] [address] for further evaluation by the neurosurgery service. Review of Systems:     Constitutional:  negative for chills, fevers, sweats, left gluteal pain at spinal stimulator site   Respiratory:  negative for cough, dyspnea on exertion, shortness of breath, wheezing  Cardiovascular:  negative for chest pain, chest pressure/discomfort, lower extremity edema, palpitations  Gastrointestinal:  negative for abdominal pain, constipation, diarrhea, nausea, vomiting  Neurological:  negative for dizziness, headache    Medications: Allergies:     Allergies   Allergen Reactions    Actos [Pioglitazone] Other (See Comments)     Weight gain    Lisinopril Other (See Comments)     cough    Metformin And Related Diarrhea    Zithromax [Azithromycin] Itching       Current Meds:   Scheduled Meds:    sodium chloride flush  5-40 mL Intravenous 2 times per day    enoxaparin  40 mg Subcutaneous Daily    piperacillin-tazobactam  3,375 mg Intravenous Q8H    vancomycin (VANCOCIN) intermittent dosing (placeholder)   Other RX Placeholder    vancomycin  1,250 mg Intravenous Q24H    atorvastatin  40 mg Oral Nightly    gabapentin  400 mg Oral TID    isosorbide mononitrate  60 mg Oral Daily    metoprolol tartrate  25 mg Oral BID    pantoprazole  40 mg Oral QAM AC    sertraline  100 mg Oral Daily    metoclopramide  5 mg Oral BID    insulin lispro  0-6 Units Subcutaneous TID WC    insulin lispro  0-3 Units Subcutaneous Nightly     Continuous Infusions:    dextrose      sodium chloride 25 mL (06/01/21 1564)    dextrose       PRN Meds: glucose, dextrose, glucagon (rDNA), dextrose, sodium chloride flush, sodium chloride, potassium chloride **OR** potassium alternative oral replacement **OR** potassium chloride, magnesium sulfate, promethazine **OR** ondansetron, polyethylene glycol, nicotine, acetaminophen **OR** acetaminophen, morphine, glucose, dextrose, glucagon (rDNA), dextrose, oxyCODONE-acetaminophen    Data:     Past Medical History:   has a past medical history of CAD (coronary artery disease), Cataracts, bilateral, Chest pain, Chronic back pain, Depression, Diabetes ([facility] [address] Diabetic polyneuropathy ([facility] [address] EKG abnormalities, GERD (gastroesophageal reflux disease), Headache, Hyperlipidemia, Hypertension, Hypothyroidism, Insulin pump in place, Neuropathy, Osteoarthritis, Peripheral vascular disease ([facility] [address] Pure hypercholesterolemia, Restless legs syndrome, Sleep apnea, Ulcer of lower extremity ([facility] [address] [facility] and Wears glasses. Social History:   reports that she has never smoked. She has never used smokeless tobacco. She reports that she does not drink alcohol and does not use drugs. Family History:   Family History   Problem Relation Age of Onset    Heart Disease Mother     Dementia Mother     Stroke Mother     Heart Disease Father     COPD Father     Heart Disease Brother     Arthritis Brother     Other Brother         AAA    Other Brother         AAA       Vitals:  BP (!) 138/54   Pulse 73   Temp 97.7 °F (36.5 °C) (Oral)   Resp 20   Ht 5' 2\" (1.575 m)   Wt 208 lb (94.3 kg)   SpO2 98%   BMI 38.04 kg/m²   Temp (24hrs), Av.7 °F (36.5 °C), Min:97.7 °F (36.5 °C), Max:97.7 °F (36.5 °C)    Recent Labs     21  1206 21  1714 21  2111 21  0754   POCGLU 183* 93 74 51*       I/O (24Hr):   No intake or output data in the 24 hours ending 21 0822    Labs:  Hematology:  Recent Labs     21  1500 21  0619 21  0757   WBC 9.3 9.5  --    RBC 3.76* 3.46*  --    HGB 10.9* 10.1*  --    HCT 32.4* 29.5*  --    MCV 86.2 85.3  --    MCH 28.9 29.2  --    MCHC 33.5 34.2  --    RDW 13.4 12.8  --     171  --    MPV 7.5 10.1  --    SEDRATE 33*  --   --    .2*  --   --    INR  --   --  0.9     Chemistry:  Recent Labs     21  1500 21  8414 * 132*   K 3.8 3.7   CL 94* 99   CO2 24 24   GLUCOSE 367* 196*   BUN 19 14   CREATININE 0.90 0.77   MG 2.1  --    ANIONGAP 13 9   LABGLOM >60 >60   GFRAA >60 >60   CALCIUM 9.4 8.6     Recent Labs     05/31/21  1500 06/01/21  2351 06/02/21  0809 06/02/21  1206 06/02/21  1714 06/02/21  2111 06/03/21  0754   PROT 7.0  --   --   --   --   --   --    LABALBU 3.8  --   --   --   --   --   --    AST 18  --   --   --   --   --   --    ALT 19  --   --   --   --   --   --    ALKPHOS 83  --   --   --   --   --   --    BILITOT 0.63  --   --   --   --   --   --    POCGLU  --  198* 176* 183* 93 74 51*     ABG:No results found for: POCPH, PHART, PH, POCPCO2, QCQ3VHW, PCO2, POCPO2, PO2ART, PO2, POCHCO3, FJQ5DEQ, HCO3, NBEA, PBEA, BEART, BE, THGBART, THB, CWM7BBW, AKFD1DUY, O6WNPFUJ, O2SAT, FIO2  Lab Results   Component Value Date/Time    SPECIAL NOT REPORTED 06/01/2021 07:51 PM     Lab Results   Component Value Date/Time    CULTURE STAPHYLOCOCCUS AUREUS MODERATE GROWTH (A) 06/01/2021 07:51 PM       Radiology:  No results found.     Physical Examination:        General appearance:  alert, cooperative and no distress  Mental Status:  oriented to person, place and time and normal affect  Lungs:  clear to auscultation bilaterally, normal effort  Heart:  regular rate and rhythm, no murmur  Abdomen:  Obese, soft, nontender, nondistended, normal bowel sounds, no masses, hepatomegaly, splenomegaly  Extremities:  no edema, redness, tenderness in the calves  Skin: JANETH drain X 2 with bloody drainage, wound vac to thoracic and lumbar areas,  insulin pump patent      Assessment:        Hospital Problems         Last Modified POA    * (Principal) Infection of spinal cord stimulator ([address] 6/1/2021 Yes    Postlaminectomy syndrome, lumbar region 6/1/2021 Yes    CAD (coronary artery disease) 6/1/2021 Yes    Essential hypertension 6/1/2021 Yes    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 Spinal cord stimulator infection neurosurgery consulted. -S/P laminectomy spinal cord stimulator explantation on 6/1 and I & D of thoracic and left flank wounds   -Continue Vanco, zosyn discontinued 6/2.   -  S. Aureus growing on surgical cultures  - BCX2 NGTD     2. Essential hypertension currently stable on home medications    3. Diabetes mellitus type 1 stable  -managed with insulin pump per patient    4. CAD stable without active signs or symptoms.   - Continue home dose of Lipitor, Imdur, Lopressor    5. Depression stable on home dose of Zoloft    6. GI/DVT prophylaxis Protonix, Lovenox    7.  Dispo: home today after abx therapy is planned for outpatient        Joaquim Essex, APRN - NP  6/3/2021  8:22 AM

## 2021-06-03 NOTE — PROGRESS NOTES
Vancomycin Day: 4    Patient's labs, cultures, vitals, and vancomycin regimen reviewed. No changes today. Will order a random level today. Preliminary wound swab from back and hardware, Culture grew few gram positive cocci in clusters and staph aureus moderate growth. Patient is also on Zosyn. Per IM notes on 6/4: Spinal cord stimulator infection neurosurgery consulted. S/P laminectomy spinal cord stimulator explantation on 6/1 and I & D of thoracic and left flank wounds. Continue Vanco/Zosyn. Will increase trough goal to be at 15-20 considering the location of infection. CBC, BMP still pending.  Tmax 97.5    Jeanna Russell, PharmD, BCPS

## 2021-06-04 LAB
GLUCOSE BLD-MCNC: 119 MG/DL (ref 65–105)
GLUCOSE BLD-MCNC: 145 MG/DL (ref 65–105)
GLUCOSE BLD-MCNC: 55 MG/DL (ref 65–105)
INTERVENTION: NORMAL

## 2021-06-04 PROCEDURE — 6370000000 HC RX 637 (ALT 250 FOR IP): Performed by: STUDENT IN AN ORGANIZED HEALTH CARE EDUCATION/TRAINING PROGRAM

## 2021-06-04 PROCEDURE — 99232 SBSQ HOSP IP/OBS MODERATE 35: CPT | Performed by: FAMILY MEDICINE

## 2021-06-04 PROCEDURE — APPSS45 APP SPLIT SHARED TIME 31-45 MINUTES: Performed by: REGISTERED NURSE

## 2021-06-04 PROCEDURE — 1200000000 HC SEMI PRIVATE

## 2021-06-04 PROCEDURE — 82947 ASSAY GLUCOSE BLOOD QUANT: CPT

## 2021-06-04 PROCEDURE — APPSS45 APP SPLIT SHARED TIME 31-45 MINUTES: Performed by: NURSE PRACTITIONER

## 2021-06-04 PROCEDURE — 6360000002 HC RX W HCPCS: Performed by: INTERNAL MEDICINE

## 2021-06-04 PROCEDURE — 2580000003 HC RX 258: Performed by: STUDENT IN AN ORGANIZED HEALTH CARE EDUCATION/TRAINING PROGRAM

## 2021-06-04 PROCEDURE — 2580000003 HC RX 258: Performed by: INTERNAL MEDICINE

## 2021-06-04 PROCEDURE — 99232 SBSQ HOSP IP/OBS MODERATE 35: CPT | Performed by: INTERNAL MEDICINE

## 2021-06-04 PROCEDURE — 6360000002 HC RX W HCPCS: Performed by: STUDENT IN AN ORGANIZED HEALTH CARE EDUCATION/TRAINING PROGRAM

## 2021-06-04 PROCEDURE — 6370000000 HC RX 637 (ALT 250 FOR IP): Performed by: NURSE PRACTITIONER

## 2021-06-04 RX ADMIN — GABAPENTIN 400 MG: 400 CAPSULE ORAL at 09:26

## 2021-06-04 RX ADMIN — DEXTROSE MONOHYDRATE 2000 MG: 50 INJECTION, SOLUTION INTRAVENOUS at 09:26

## 2021-06-04 RX ADMIN — PANTOPRAZOLE SODIUM 40 MG: 40 TABLET, DELAYED RELEASE ORAL at 06:07

## 2021-06-04 RX ADMIN — METOCLOPRAMIDE 5 MG: 5 TABLET ORAL at 09:26

## 2021-06-04 RX ADMIN — METOCLOPRAMIDE 5 MG: 5 TABLET ORAL at 20:51

## 2021-06-04 RX ADMIN — LEVOTHYROXINE SODIUM 88 MCG: 88 TABLET ORAL at 06:07

## 2021-06-04 RX ADMIN — DEXTROSE MONOHYDRATE 2000 MG: 50 INJECTION, SOLUTION INTRAVENOUS at 01:43

## 2021-06-04 RX ADMIN — METOPROLOL TARTRATE 25 MG: 25 TABLET ORAL at 20:50

## 2021-06-04 RX ADMIN — ENOXAPARIN SODIUM 40 MG: 40 INJECTION, SOLUTION INTRAVENOUS; SUBCUTANEOUS at 09:27

## 2021-06-04 RX ADMIN — METOPROLOL TARTRATE 25 MG: 25 TABLET ORAL at 09:26

## 2021-06-04 RX ADMIN — GABAPENTIN 400 MG: 400 CAPSULE ORAL at 20:51

## 2021-06-04 RX ADMIN — OXYCODONE HYDROCHLORIDE AND ACETAMINOPHEN 1 TABLET: 5; 325 TABLET ORAL at 20:50

## 2021-06-04 RX ADMIN — CEFTRIAXONE SODIUM 2000 MG: 2 INJECTION, POWDER, FOR SOLUTION INTRAMUSCULAR; INTRAVENOUS at 14:43

## 2021-06-04 RX ADMIN — SERTRALINE 100 MG: 50 TABLET, FILM COATED ORAL at 09:26

## 2021-06-04 RX ADMIN — GABAPENTIN 400 MG: 400 CAPSULE ORAL at 14:43

## 2021-06-04 RX ADMIN — SODIUM CHLORIDE, PRESERVATIVE FREE 10 ML: 5 INJECTION INTRAVENOUS at 20:51

## 2021-06-04 RX ADMIN — FOLIC ACID 1 MG: 1 TABLET ORAL at 09:26

## 2021-06-04 RX ADMIN — DESMOPRESSIN ACETATE 40 MG: 0.2 TABLET ORAL at 20:51

## 2021-06-04 RX ADMIN — ASPIRIN 81 MG: 81 TABLET, CHEWABLE ORAL at 09:26

## 2021-06-04 ASSESSMENT — ENCOUNTER SYMPTOMS
COUGH: 0
BLOOD IN STOOL: 0
NAUSEA: 0
PHOTOPHOBIA: 0
BACK PAIN: 0
ABDOMINAL PAIN: 0
SHORTNESS OF BREATH: 0
SORE THROAT: 0
DIARRHEA: 0
WHEEZING: 0
RHINORRHEA: 0
TROUBLE SWALLOWING: 0
CONSTIPATION: 1
EYE REDNESS: 0
VOMITING: 0
EYE DISCHARGE: 0

## 2021-06-04 ASSESSMENT — PAIN SCALES - GENERAL
PAINLEVEL_OUTOF10: 7
PAINLEVEL_OUTOF10: 3

## 2021-06-04 ASSESSMENT — PAIN DESCRIPTION - PAIN TYPE: TYPE: SURGICAL PAIN

## 2021-06-04 ASSESSMENT — PAIN DESCRIPTION - ORIENTATION: ORIENTATION: LEFT;LOWER;MID;UPPER

## 2021-06-04 ASSESSMENT — PAIN - FUNCTIONAL ASSESSMENT: PAIN_FUNCTIONAL_ASSESSMENT: ACTIVITIES ARE NOT PREVENTED

## 2021-06-04 ASSESSMENT — PAIN DESCRIPTION - FREQUENCY: FREQUENCY: CONTINUOUS

## 2021-06-04 ASSESSMENT — PAIN DESCRIPTION - DESCRIPTORS: DESCRIPTORS: ACHING;CONSTANT;DISCOMFORT

## 2021-06-04 ASSESSMENT — PAIN DESCRIPTION - ONSET: ONSET: ON-GOING

## 2021-06-04 ASSESSMENT — PAIN DESCRIPTION - PROGRESSION: CLINICAL_PROGRESSION: NOT CHANGED

## 2021-06-04 ASSESSMENT — PAIN DESCRIPTION - LOCATION: LOCATION: BACK

## 2021-06-04 NOTE — PROGRESS NOTES
Neurosurgery ZABRINA/Resident    Daily Progress Note   No chief complaint on file. 6/4/2021  7:29 AM    Chart reviewed. No acute events overnight. No new complaints. Afebrile overnight. Vitals:    06/03/21 1203 06/03/21 1956 06/04/21 0530 06/04/21 0724   BP: (!) 124/50 (!) 136/54 (!) 108/56 (!) 119/52   Pulse: 70 84 74 76   Resp: 16 16 15 18   Temp: 97.5 °F (36.4 °C) 97.9 °F (36.6 °C) 98.4 °F (36.9 °C) 98 °F (36.7 °C)   TempSrc: Oral Oral Oral Oral   SpO2: 98% 97% 97% 92%   Weight:       Height:             PE: AOx3   Motor   L deltoid 5/5; R deltoid 5/5  L biceps 5/5; R biceps 5/5  L triceps 5/5; R triceps 5/5  L intrinsics 5/5; R intrinsics 5/5      L iliopsoas 5/5 , R iliopsoas 5/5  L quadriceps 5/5; R quadriceps 5/5  L Dorsiflexion 5/5; R dorsiflexion 5/5  L Plantarflexion 5/5; R plantarflexion 5/5    Sensation intact    Drain output   JANETH drain thoracic site 10 ml/12h   JANETH drain to flank area 5 ml/12h  Incision Prevena intact      Lab Results   Component Value Date    WBC 10.8 06/03/2021    HGB 10.4 (L) 06/03/2021    HCT 33.6 (L) 06/03/2021     06/03/2021    CHOL 136.0 10/23/2020    TRIG 95.0 10/23/2020    HDL 52 10/23/2020    ALT 19 05/31/2021    AST 18 05/31/2021     06/03/2021    K 3.4 (L) 06/03/2021    CL 99 06/03/2021    CREATININE 0.61 06/03/2021    BUN 8 06/03/2021    CO2 22 06/03/2021    TSH 6.08 (H) 06/03/2021    INR 0.9 06/01/2021    LABA1C 8.7 11/09/2020    LABA1C 8.7 11/09/2020    LABMICR 7 09/13/2016    .2 (H) 05/31/2021    SEDRATE 33 (H) 05/31/2021     Culture, Anaerobic and Aerobic [[id number]] (Abnormal)    Collected: 06/01/21 1753    Updated: 06/03/21 2009    Specimen Type: Swab    Specimen Source: Back     Specimen Description . BACK DEEP THORACIC    Special Requests NOT REPORTED    Direct Exam MODERATE NEUTROPHILSAbnormal      RARE GRAM POSITIVE COCCI IN PAIRSAbnormal     Culture STAPHYLOCOCCUS AUREUS LIGHT GROWTH For susceptibility, refer to previous culture. Abnormal NO ANAEROBIC ORGANISMS ISOLATED AT 2 DAYSAbnormal    Culture, Anaerobic and Aerobic [[id number]] (Abnormal)    Collected: 06/01/21 1951    Updated: 06/03/21 2007    Specimen Type: Hardware     Specimen Description . HARDWARE GENERATOR    Special Requests NOT REPORTED    Direct Exam FEW NEUTROPHILSAbnormal      FEW GRAM POSITIVE COCCI IN CLUSTERSAbnormal     Culture STAPHYLOCOCCUS AUREUS MODERATE GROWTH For susceptibility, refer to previous culture. Abnormal      NO ANAEROBIC ORGANISMS ISOLATED AT 2 DAYSAbnormal    Culture, Anaerobic and Aerobic [[id number]] (Abnormal)    Collected: 06/01/21 1927    Updated: 06/03/21 2006    Specimen Type: Swab    Specimen Source: Back     Specimen Description . BACK GENERATOR POCKET    Special Requests NOT REPORTED    Direct Exam MODERATE NEUTROPHILSAbnormal      FEW GRAM POSITIVE COCCI IN CLUSTERSAbnormal     Culture STAPHYLOCOCCUS AUREUS MODERATE GROWTH For susceptibility, refer to previous culture. Abnormal      NO ANAEROBIC ORGANISMS ISOLATED AT 2 DAYSAbnormal    Culture, Anaerobic and Aerobic [[id number]] (Abnormal)    Collected: 06/01/21 1754    Updated: 06/03/21 2004    Specimen Type: Swab    Specimen Source: Back     Specimen Description . BACK THORACIC INCISION    Special Requests NOT REPORTED    Direct Exam NO NEUTROPHILS SEEN     FEW GRAM POSITIVE COCCI IN PAIRSAbnormal      RARE GRAM POSITIVE COCCI IN CLUSTERSAbnormal     Culture STAPHYLOCOCCUS AUREUS LIGHT GROWTH For susceptibility, refer to previous culture. Abnormal      NO ANAEROBIC ORGANISMS ISOLATED AT 2 DAYSAbnormal    Culture, Anaerobic and Aerobic [[id number]] (Abnormal)     Collected: 06/01/21 1928    Updated: 06/03/21 2003    Specimen Type: Hardware     Specimen Description . HARDWARE SPINAL CORD STIMULATOR LEAD    Special Requests NOT REPORTED    Direct Exam NO NEUTROPHILS SEEN     NO ORGANISMS SEEN    Culture STAPHYLOCOCCUS AUREUS LIGHT GROWTH This isolate is methicillin susceptible. Abnormal      NO ANAEROBIC ORGANISMS ISOLATED AT 2 DAYSAbnormal          Radiology   CT THORACIC SPINE W CONTRAST    Result Date: 6/1/2021  EXAMINATION: CT OF THE THORACIC SPINE WITH CONTRAST  6/1/2021 8:24 am: TECHNIQUE: CT of the thoracic spine was performed with the administration of intravenous contrast.  Multiplanar reformatted images are provided for review. Dose modulation, iterative reconstruction, and/or weight based adjustment of the mA/kV was utilized to reduce the radiation dose to as low as reasonably achievable. COMPARISON: None. HISTORY: ORDERING SYSTEM PROVIDED HISTORY: Pain at stimulator site in thoracic spine TECHNOLOGIST PROVIDED HISTORY: Pain at stimulator site in thoracic spine Reason for Exam: pain at stimulator site in thoracic spine FINDINGS: BONES/ALIGNMENT: There is a normal thoracic kyphosis. The vertebral body heights are maintained. The facet joints are aligned. There is no spondylolisthesis. DEGENERATIVE CHANGES: There is mild multilevel degenerative disc disease with endplate osteophytosis. There is no osseous canal stenosis foraminal narrowing. SOFT TISSUES: The posterior paraspinal soft tissues are unremarkable. The visualized mediastinal structures are unremarkable. There is dependent atelectasis in the visualized lungs. The visualized abdominal structures are unremarkable. There is a spinal cord stimulator that enters the thoracic spinal canal at the T9-10 level. The leads are demonstrated in the posterior epidural space at the T7 and T8 level. The leads are intact and unremarkable. Spinal cord stimulator with leads demonstrated in the posterior epidural space at the T7 and T8 level. The leads are intact and unremarkable. Mild multilevel degenerative disc disease without significant canal stenosis or foraminal narrowing. Bilateral atelectasis.      MRI THORACIC SPINE W WO CONTRAST    Result Date: 6/3/2021  EXAMINATION: MRI OF THE THORACIC SPINE WITHOUT AND WITH CONTRAST  6/3/2021 6:35 pm TECHNIQUE: Multiplanar multisequence MRI of the thoracic spine was performed without and with the administration of intravenous contrast. COMPARISON: 06/01/2021 CT HISTORY: ORDERING SYSTEM PROVIDED HISTORY: Look for thoracic spine abscess or osteomyelitis TECHNOLOGIST PROVIDED HISTORY: Look for thoracic spine abscess or osteomyelitis Just had the pain pump removed Reason for Exam: look for thoracic spine abscess or osteomyelitis ; just had the pain pump removed FINDINGS: BONES/ALIGNMENT: There is kyphosis in the upper thoracic spine. No significant listhesis. Vertebral body heights are maintained. No aggressive marrow signal abnormality. There are degenerative marrow signal changes at multiple levels. Vertebral body heights are maintained. SPINAL CORD: No abnormal cord signal is seen. No abnormal enhancement in the thoracic cord. SOFT TISSUES:  No paraspinal masses identified. There is some edema and stranding in the posterior spinal soft tissues at the T9-10 level, in the area of spinal stimulator removal.  There is a small fluid collection in the posterior epidural space at the T6-7 level extending down to T7-T8 with minimal associated surrounding enhancement. Enhancement of the epidural space extends to the T9-10 level. The fluid collection measures approximately 10 x 3 x 19 mm (axial T2 image 15 and sagittal T2 image 9). DEGENERATIVE CHANGES: No significant spinal canal stenosis or neural foraminal narrowing of the thoracic spine. 1. Small fluid collection in the posterior epidural space at T6-7 and T7-T8 with some surrounding enhancement. While this could be postprocedural, abscess cannot be excluded. There is enhancing soft tissue in the epidural space extending to the T9-10 level, which could be postoperative or related to phlegmon. No subcutaneous abscess is identified. 2. Multilevel degenerative changes of the thoracic spine. 3. No marrow signal abnormality to suggest discitis/osteomyelitis. A/P  72 y.o. female who presents with spinal cord stimulator infection   POD#3 status post explantation    - MRI thoracic reviewed-no need for further surgery  - discontinue JANETH drains today, continue prevena   - antibiotics per ID recommendations  - follow up with Dr Veronica Nesbitt in 2 weeks for incision site check and repeat MRI thoracic spine without contrast    Please contact neurosurgery with any changes in patients neurologic status.        Boniat Lambert CNP  6/4/21  7:29 AM

## 2021-06-04 NOTE — PROGRESS NOTES
[facility] -   Infectious diseases evaluation  admission date 5/31/2021    reason for consultation:   Infection of spinal cord stimulator    Impression :   Current:  · Infection of spinal cord stimulator  · 6/1 explant stimulator and wash out  · Cultures growing S. Aureus MSSA, no bacteremia  · T spine epidural abscess, site of the past lead , no OM or diskitis on MRI 6/3  · Elevated CRP: 168.2  · Elevated lactate-resolved    Other:  Diabetes type 2  Allergy zithromax - itching , no rash    Discussion / summary of stay / plan of care   · 4/16 Spinal cord stimulator placed, leads in the T spine Dr Isaiah Solomon anesthesia  · 5/31 Erythema, warmth, swelling, pain, and purulent discharge from site  · T spine sx site red tender- CT T spine no tenderness  · Explant stimulator and wash out 6/1  · MRI: Small fluid collection in the posterior epidural space at T6-7 and T7-T8 with some surrounding enhancement.  While this could be postprocedural, abscess cannot be excluded. Not suggestive of OM  Recommendations   · DC on ceftriaxone 2 g daily  least 2 weeks and likely 4 weeks till 7/1 to allow full resolution of the epidural abscess  · Ok for DC w midline    Infection Control Recommendations   · [place] Precautions    Antimicrobial Stewardship Recommendations   · Simplification of therapy  · Targeted therapy  · Per Kg dosing  Coordination ofOutpatient Care:   · Estimated Length of IV antimicrobials:  · Patient will need Midline / picc Catheter Insertion:   · Patient will need SNF:  · Patient will need outpatient wound care:     History of Present Illness:   Initial history:  Lea Mcgee is a 72y.o.-year-old female presented to the ED 5/31 for wound infection. Patient recently had a back stimulator placed by Dr. Augustin Yu 4/16/21.  She states she went for a battery change yesterday and her pain doctor sent her here for erythema, warmth, swelling, and some purulent discharge from the changes of the thoracic spine. 3. No marrow signal abnormality to suggest discitis/osteomyelitis. CT Thoracic Spine: Spinal cord stimulator with leads demonstrated in the posterior epidural  space at the T7 and T8 level.  The leads are intact and unremarkable. Mild multilevel degenerative disc disease without significant canal stenosis or foraminal narrowing. Bilateral atelectasis. I have personally reviewed the past medical history, past surgical history, medications, social history, and family history, and I haveupdated the database accordingly. Allergies:   Actos [pioglitazone], Lisinopril, Metformin and related, and Zithromax [azithromycin]     Review of Systems:     Review of Systems   Constitutional: Negative for appetite change, chills and fever. HENT: Negative for congestion, rhinorrhea, sore throat and trouble swallowing. Eyes: Negative for photophobia, discharge and redness. Respiratory: Negative for cough, shortness of breath and wheezing. Cardiovascular: Negative for chest pain and leg swelling. Gastrointestinal: Positive for constipation. Negative for abdominal pain, blood in stool, diarrhea, nausea and vomiting. Endocrine: Negative for polyphagia. Genitourinary: Negative for difficulty urinating, dysuria, flank pain, hematuria and urgency. Musculoskeletal: Negative for back pain. Skin: Positive for wound. Allergic/Immunologic: Negative for immunocompromised state. Neurological: Negative for tremors, seizures, syncope and headaches. Hematological: Does not bruise/bleed easily. Psychiatric/Behavioral: Negative for agitation and confusion. Physical Examination :       Physical Exam  Constitutional:       General: She is not in acute distress. Appearance: Normal appearance. She is obese. She is not ill-appearing or toxic-appearing. HENT:      Head: Normocephalic. Nose: Nose normal. No congestion or rhinorrhea.       Mouth/Throat:      Mouth: Mucous membranes are moist.   Eyes:      General: No scleral icterus. Right eye: No discharge. Left eye: No discharge. Conjunctiva/sclera: Conjunctivae normal.   Cardiovascular:      Rate and Rhythm: Normal rate and regular rhythm. Heart sounds: Normal heart sounds. No murmur heard. Pulmonary:      Effort: Pulmonary effort is normal. No respiratory distress. Breath sounds: Normal breath sounds. No wheezing. Abdominal:      General: There is no distension. Tenderness: There is no abdominal tenderness. There is no guarding. Genitourinary:     Comments: No fleming  Musculoskeletal:      Cervical back: Normal range of motion and neck supple. Right lower leg: No edema. Left lower leg: No edema. Skin:     General: Skin is warm. Coloration: Skin is not jaundiced. Findings: Wound present. No bruising. Comments: Midline thoracic and left lumbar wound vacs in place with drain    Neurological:      General: No focal deficit present. Mental Status: She is alert and oriented to person, place, and time. Psychiatric:         Mood and Affect: Mood normal.         Behavior: Behavior normal.         Past Medical History:     Past Medical History:   Diagnosis Date    CAD (coronary artery disease)     \"40 % blockage lower heart\"    Cataracts, bilateral     Chest pain     pressure    Chronic back pain     Depression     Diabetes ([address]     Diabetic polyneuropathy ([address]     EKG abnormalities 11/14/2016    RT. BBB    GERD (gastroesophageal reflux disease)     Headache     Hyperlipidemia     Hypertension     Hypothyroidism     Insulin pump in place     Neuropathy     Osteoarthritis     Peripheral vascular disease (HCC)     Pure hypercholesterolemia     Restless legs syndrome     Sleep apnea     Ulcer of lower extremity ([address]     Wears glasses        Past Surgical  History:     Past Surgical History:   Procedure Laterality Date    BACK SURGERY      cage in place at L5-S1., SPINAL CORD STIMULATOR    CARPAL TUNNEL RELEASE Bilateral     CATARACT REMOVAL WITH IMPLANT Right 05/16/2017    CATARACT REMOVAL WITH IMPLANT Left 06/13/2017    DR SHARON HERRERA    CHOLECYSTECTOMY      COLONOSCOPY  07/13/2004    normal    COLONOSCOPY  09/14/2016    no lesions seen, spasms sigmoid colon    COLONOSCOPY  01/14/2021    DR KARLEY MARTINS-    CYST REMOVAL Right     GANGLION CYST REMOVED.  ENDOSCOPY, COLON, DIAGNOSTIC      EGD    JOINT REPLACEMENT Left     knee    OTHER SURGICAL HISTORY  11/21/2016    Revision of spinal cord stimulator    SPINAL CORD STIMULATOR SURGERY N/A 4/16/2021    SPINAL CORD STIMULATOR  BATTERY REPLACEMENT - ABBOTT  -   dr reinaldo huang please performed by Yevgeniy Sharpe MD at [address] N/A 6/1/2021    EXPLANT PADDLE SPINAL CORD STIMULATOR, I & D OF WOUND performed by Haley Ayon DO at [address] ENDOSCOPY  08/16/2016    NO LESIONS SEEN    UPPER GASTROINTESTINAL ENDOSCOPY  01/14/2021    ESOPHAGEAL POLYP - DR Donta ROSALESJAYA       Medications:      aspirin  81 mg Oral Daily    folic acid  1 mg Oral Daily    levothyroxine  88 mcg Oral Daily    ceFAZolin  2,000 mg Intravenous Q8H    sodium chloride flush  5-40 mL Intravenous 2 times per day    enoxaparin  40 mg Subcutaneous Daily    atorvastatin  40 mg Oral Nightly    gabapentin  400 mg Oral TID    isosorbide mononitrate  60 mg Oral Daily    metoprolol tartrate  25 mg Oral BID    pantoprazole  40 mg Oral QAM AC    sertraline  100 mg Oral Daily    metoclopramide  5 mg Oral BID    insulin lispro  0-6 Units Subcutaneous TID WC    insulin lispro  0-3 Units Subcutaneous Nightly       Social History:     Social History     Socioeconomic History    Marital status:       Spouse name: Not on file    Number of children: Not on file    Years of education: Not on file    Highest education level: Not on file Occupational History    Not on file   Tobacco Use    Smoking status: Never Smoker    Smokeless tobacco: Never Used   Vaping Use    Vaping Use: Never used   Substance and Sexual Activity    Alcohol use: No    Drug use: No    Sexual activity: Not on file   Other Topics Concern    Not on file   Social History Narrative    Not on file     Social Determinants of Health     Financial Resource Strain: Low Risk     Difficulty of Paying Living Expenses: Not hard at all   Food Insecurity: No Food Insecurity    Worried About [address] [facility] in the Last Year: Never true    [facility] [employer] in the Last Year: Never true   Transportation Needs:     Lack of Transportation (Medical):      Lack of Transportation (Non-Medical):    Physical Activity:     Days of Exercise per Week:     Minutes of Exercise per Session:    Stress:     Feeling of Stress :    Social Connections:     Frequency of Communication with Friends and Family:     Frequency of Social Gatherings with Friends and Family:     Attends Baptism Services:     Active Member of Clubs or Organizations:     Attends Club or Organization Meetings:     Marital Status:    Intimate Partner Violence:     Fear of Current or Ex-Partner:     Emotionally Abused:     Physically Abused:     Sexually Abused:        Family History:     Family History   Problem Relation Age of Onset    Heart Disease Mother     Dementia Mother     Stroke Mother     Heart Disease Father     COPD Father     Heart Disease Brother     Arthritis Brother     Other Brother         AAA    Other Brother         AAA      Medical Decision Making:   I have independently reviewed/ordered the following labs:    CBC with Differential:   Recent Labs     06/03/21  0945   WBC 10.8   HGB 10.4*   HCT 33.6*        BMP:  Recent Labs     06/03/21  0945      K 3.4*   CL 99   CO2 22   BUN 8   CREATININE 0.61   MG 2.3     Hepatic Function Panel:   No results for input(s): PROT, LABALBU, BILIDIR, IBILI, BILITOT, ALKPHOS, ALT, AST in the last 72 hours. No results for input(s): RPR in the last 72 hours. No results for input(s): HIV in the last 72 hours. No results for input(s): BC in the last 72 hours. Lab Results   Component Value Date    CREATININE 0.61 06/03/2021    GLUCOSE 164 06/03/2021    GLUCOSE 310 10/13/2011       Detailed results: Thank you for allowing us to participate in the care of this patient. Please call with questions. This note is created with the assistance of a speech recognition program.  While intending to generate adocument that actually reflects the content of the visit, the document can still have some errors including those of syntax and sound a like substitutions which may escape proof reading. It such instances, actual meaningcan be extrapolated by contextual diversion. Suleman Ann, [address] [place] [address]  Office: [phone]  Perfect serve / office [phone]    I have discussed the care of the patient, including pertinent history and exam findings,  with the student. I have seen and examined the patient and the key elements of all parts of the encounter have been performed by me.   I agree with the assessment, plan and orders as documented by the student    Kitty Aouad, Infectious Diseases

## 2021-06-04 NOTE — CARE COORDINATION
Transposition planning   Pt going home on IV antibiotics  -Called May at [facility] and faxed information to fax for referral- Referral sent to [facility] and [facility] needs  Waiting for verification for meds still looking for home care - Suraj reviewing will call me back - Kylah unable to acess information in computer faxed clinicals to them - Fax failed to Suraj refaxed . Will need to check in AM need Homecare before check with Kylah . Updated pt on where we are at. [facility] unable d[facility]t staffing [facility] cannot go into [place] is likely able to cover but checking when they can start. Will call in AM   Spoke with May and left message also Annmarie Dockery is working on it now - pt has much help and I relayed this to each of the referrals/ Requested call back as soon as possible Updated pt.

## 2021-06-04 NOTE — PLAN OF CARE
Problem: Pain:  Goal: Pain level will decrease  Description: Pain level will decrease  6/4/2021 1806 by Rosas Romano RN  Outcome: Ongoing  6/4/2021 0511 by Atul Hoskins RN  Outcome: Ongoing  Goal: Control of acute pain  Description: Control of acute pain  6/4/2021 1806 by Rosas Romano RN  Outcome: Ongoing  6/4/2021 0511 by Atul Hoskins RN  Outcome: Ongoing  Goal: Control of chronic pain  Description: Control of chronic pain  6/4/2021 1806 by Rosas Romano RN  Outcome: Ongoing  6/4/2021 0511 by Atul Hoskins RN  Outcome: Ongoing

## 2021-06-04 NOTE — DISCHARGE SUMMARY
[facility]  Office: [address], DO, Christophe Rodriguez, DO, Greg Man, DO, Austin Willingham, DO, Neil Byrne MD, Roxane Diaz MD, Virgilio Worley MD, Bola Marroquin MD, Olman Car MD, Kraig Wall MD, Hira Mccabe MD, Kaylee Cannon MD, Vandana Bishop, DO, Niranjan Uriarte MD, Rocio Melo DO, Shahid Mccarty MD,  Jessica Tam DO, Antoni Villavicencio MD, Carlton Murphy MD, Soni Lam MD, Omar Butler MD, Charisse Camacho, [facility], [facility], CNP, Elie Rodriguez, CNP, Shiela Acosta, CNS, Neena Mcintyre, CNP, Malia Casiano, CNP, Isabelle De La Cruz, CNP, Familia Leslie, CNP, Sebastian Gandara, CNP, Jayjay Jones PA-C, Isacc Adam, GHADA, Dusty Marshall, CNP, Melba Carrera, CNP, Tracee Little, CNP, Cheryl Avina, CNP, Abdulaziz Sloan, CNP, Ana Monique,  [facility]    Discharge Summary     Patient ID: Mal Stratton  :  1955   MRN: [id number]     ACCOUNT:  [de-identified]   Patient's PCP: Angie Coleman MD  Admit Date: 2021   Discharge Date: 2021     Length of Stay: 4  Code Status:  Full Code  Admitting Physician: Bola Marroquin MD  Discharge Physician: Isacc Adam, APRN - NP     Active Discharge Diagnoses:     Hospital Problem Lists:  Principal Problem:    Infection of spinal cord stimulator [facility])  Active Problems:    Postlaminectomy syndrome, lumbar region    CAD (coronary artery disease)    Essential hypertension    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 *      Admission Condition:  fair     Discharged Condition: good    Hospital Stay:     Hospital Course:  Mal Stratton is a 72 y.o. female who was admitted for the management of   Infection of spinal cord stimulator [facility]) , presented to ER with No chief complaint on file.     Harshal gomez 72 y.o. Non-/non  female who presents with wound infection and is admitted to the hospital for the management of infected implant/stimulator.       Patient presents to [facility] emergency room with a concern of infection at the site of her spinal cord stimulator.  Patient states that she had her spinal cord stimulator battery replaced 4/16/21 and the site of the surgery is now infected. Rosas Ortiz states that she started to develop redness and swelling at the site and then was seen in the [facility] emergency room and was sent to [place] [address] for further evaluation by the neurosurgery service. Significant therapeutic interventions:     1. Spinal cord stimulator infection neurosurgery consulted. -S/P laminectomy spinal cord stimulator explantation on 6/1 and I & D of thoracic and left flank wounds   -Continue Vanco, zosyn discontinued 6/2.   -  S. Aureus growing on surgical cultures  - BCX2 NGTD   -MRI negative for osteo  -continue Ancef for 4 weeks     2. Essential hypertension currently stable on home medications     3. Diabetes mellitus type 1 stable  -managed with insulin pump per patient     4. CAD stable without active signs or symptoms.   - Continue home dose of Lipitor, Imdur, Lopressor     5. Depression stable on home dose of Zoloft     6. GI/DVT prophylaxis Protonix, Lovenox     7.  Dispo: home today after abx therapy is planned for outpatient         Significant Diagnostic Studies:   Labs / Micro:  CBC:   Lab Results   Component Value Date    WBC 10.8 06/03/2021    RBC 3.65 06/03/2021    RBC 4.34 10/13/2011    HGB 10.4 06/03/2021    HCT 33.6 06/03/2021    MCV 92.1 06/03/2021    MCH 28.5 06/03/2021    MCHC 31.0 06/03/2021    RDW 13.1 06/03/2021     06/03/2021     10/13/2011     BMP:    Lab Results   Component Value Date    GLUCOSE 164 06/03/2021    GLUCOSE 310 10/13/2011     06/03/2021    K 3.4 06/03/2021    CL 99 06/03/2021    CO2 22 06/03/2021    ANIONGAP 16 06/03/2021    BUN 8 06/03/2021    CREATININE 0.61 06/03/2021    BUNCRER NOT REPORTED 06/03/2021    CALCIUM 8.9 06/03/2021    LABGLOM >60 06/03/2021    GFRAA >60 06/03/2021    GFR      06/03/2021    GFR NOT REPORTED 06/03/2021     HFP:    Lab Results   Component Value Date    PROT 7.0 05/31/2021     CMP:    Lab Results   Component Value Date    GLUCOSE 164 06/03/2021    GLUCOSE 310 10/13/2011     06/03/2021    K 3.4 06/03/2021    CL 99 06/03/2021    CO2 22 06/03/2021    BUN 8 06/03/2021    CREATININE 0.61 06/03/2021    ANIONGAP 16 06/03/2021    ALKPHOS 83 05/31/2021    ALT 19 05/31/2021    AST 18 05/31/2021    BILITOT 0.63 05/31/2021    LABALBU 3.8 05/31/2021    ALBUMIN NOT REPORTED 05/31/2021    LABGLOM >60 06/03/2021    GFRAA >60 06/03/2021    GFR      06/03/2021    GFR NOT REPORTED 06/03/2021    PROT 7.0 05/31/2021    CALCIUM 8.9 06/03/2021     PT/INR:    Lab Results   Component Value Date    PROTIME 10.0 06/01/2021    INR 0.9 06/01/2021     PTT:   Lab Results   Component Value Date    APTT 22.1 06/01/2021     FLP:    Lab Results   Component Value Date    CHOL 136.0 10/23/2020    TRIG 95.0 10/23/2020    HDL 52 10/23/2020     U/A:    Lab Results   Component Value Date    COLORU DARK YELLOW 09/13/2016    TURBIDITY CLEAR 09/13/2016    SPECGRAV 1.023 09/13/2016    HGBUR NEGATIVE 09/13/2016    PHUR 5.0 09/13/2016    PROTEINU NEGATIVE 09/13/2016    GLUCOSEU NEGATIVE 09/13/2016    KETUA NEGATIVE 09/13/2016    BILIRUBINUR NEGATIVE  Verified by ictotest. 09/13/2016    UROBILINOGEN Normal 09/13/2016    NITRU NEGATIVE 09/13/2016    LEUKOCYTESUR NEGATIVE 09/13/2016     TSH:    Lab Results   Component Value Date    TSH 6.08 06/03/2021        Radiology:  CT THORACIC SPINE W CONTRAST    Result Date: 6/1/2021  Spinal cord stimulator with leads demonstrated in the posterior epidural space at the T7 and T8 level. The leads are intact and unremarkable.  Mild multilevel degenerative disc disease without significant canal stenosis or foraminal narrowing. Bilateral atelectasis. MRI THORACIC SPINE W WO CONTRAST    Result Date: 6/3/2021  1. Small fluid collection in the posterior epidural space at T6-7 and T7-T8 with some surrounding enhancement. While this could be postprocedural, abscess cannot be excluded. There is enhancing soft tissue in the epidural space extending to the T9-10 level, which could be postoperative or related to phlegmon. No subcutaneous abscess is identified. 2. Multilevel degenerative changes of the thoracic spine. 3. No marrow signal abnormality to suggest discitis/osteomyelitis. XR CHEST PORTABLE    Result Date: 6/1/2021  No acute airspace disease identified. Consultations:    Consults:     Final Specialist Recommendations/Findings:   IP CONSULT TO NEUROSURGERY  IP CONSULT TO INFECTIOUS DISEASES  IP CONSULT TO INFECTIOUS DISEASES  IP CONSULT TO HOME CARE NEEDS      The patient was seen and examined on day of discharge and this discharge summary is in conjunction with any daily progress note from day of discharge.     Discharge plan:     Disposition: Home with home care     Physician Follow Up:     Boris Carrasco MD  [address] [place] [address]    In 1 week  for follow up after hospitalization    Kitty Medina MD  [address] [place] [address], [address] [place] [address] [phone]    Call in 2 weeks  for follow up after hospitalization    Tierra Valerio, [address] [place] [address] # [address] [facility] 91  [phone]    Schedule an appointment as soon as possible for a visit  for follow up after hospitalization       Requiring Further Evaluation/Follow Up POST HOSPITALIZATION/Incidental Findings: wound care, home with homecare     Diet: regular diet    Activity: As tolerated    Instructions to Patient: see attached     Discharge Medications:      Medication List      START taking these medications    cefTRIAXone  infusion  Commonly known as: ROCEPHIN  Infuse paper prescription for each of these medications  · cefTRIAXone  infusion         No discharge procedures on file. Time Spent on discharge is  31 mins in patient examination, evaluation, counseling as well as medication reconciliation, prescriptions for required medications, discharge plan and follow up. Discussed with attending   Electronically signed by   LUCIE Ha NP  6/5/2021  1:09 PM      Thank you Dr. Martha Leong MD for the opportunity to be involved in this patient's care.

## 2021-06-04 NOTE — PLAN OF CARE
Problem: Pain:  Goal: Pain level will decrease  Description: Pain level will decrease  6/4/2021 0511 by Hadassah Bamberger, RN  Outcome: Ongoing  6/3/2021 1924 by Horacio Payan RN  Outcome: Ongoing  Goal: Control of acute pain  Description: Control of acute pain  6/4/2021 0511 by Hadassah Bamberger, RN  Outcome: Ongoing  6/3/2021 1924 by Horacio Payan RN  Outcome: Ongoing  Goal: Control of chronic pain  Description: Control of chronic pain  6/4/2021 0511 by Hadassah Bamberger, RN  Outcome: Ongoing  6/3/2021 1924 by Horacio Payan RN  Outcome: Ongoing

## 2021-06-04 NOTE — PLAN OF CARE
JANETH drain x2 removed. Ok to discharge from [facility] standpoint  Follow up in 2 weeks for suture removal.  Yuliet Noa x2 to be removed when batteries no longer working.  Patient instructed on removal.

## 2021-06-04 NOTE — DISCHARGE INSTR - COC
Continuity of Care Form    Patient Name: Rachna Delatorre   :  1955  MRN:  [id number]    Admit date:  2021  Discharge date:  2021    Code Status Order: Full Code   Advance Directives:   Advance Care Flowsheet Documentation       Date/Time Healthcare Directive Type of Healthcare Directive Copy in [address] Agent's Name Healthcare Agent's Phone Number    21 1538  No, patient does not have an advance directive for healthcare treatment -- -- -- -- --    21  No, patient does not have an advance directive for healthcare treatment -- -- -- -- --            Admitting Physician:  Zahra Ledbetter MD  PCP: Sarai Robert MD    Discharging Nurse: [facility] Unit/Room#: 3028/5523-11  Discharging Unit Phone Number: [phone]    Emergency Contact:   Extended Emergency Contact Information  Primary Emergency Contact: Nicole Russell  Address: [address] Phone: [phone]  Relation: Child  Hearing or visual needs: None  Other needs: None  Preferred language: English   needed? No  Secondary Emergency Contact: [address] [place] [address] Phone: [phone]  Relation: Child    Past Surgical History:  Past Surgical History:   Procedure Laterality Date    BACK SURGERY      cage in place at L5-S1., SPINAL CORD STIMULATOR    CARPAL TUNNEL RELEASE Bilateral     CATARACT REMOVAL WITH IMPLANT Right 2017    CATARACT REMOVAL WITH IMPLANT Left 2017    DR SHARON HERRERA    CHOLECYSTECTOMY      COLONOSCOPY  2004    normal    COLONOSCOPY  2016    no lesions seen, spasms sigmoid colon    COLONOSCOPY  2021    DR KARLEY GUEVARAR-    CYST REMOVAL Right     GANGLION CYST REMOVED.     ENDOSCOPY, COLON, DIAGNOSTIC      EGD    JOINT REPLACEMENT Left     knee    OTHER SURGICAL HISTORY  2016    Revision of spinal cord stimulator    SPINAL CORD STIMULATOR SURGERY N/A 2021    SPINAL Tory Cartwright  -    requests closer please performed by Marah Wade MD at [address] N/A 6/1/2021    EXPLANT PADDLE SPINAL CORD STIMULATOR, I & D OF WOUND performed by Rocio Mccabe DO at [address] ENDOSCOPY  08/16/2016    NO LESIONS SEEN    UPPER GASTROINTESTINAL ENDOSCOPY  01/14/2021    ESOPHAGEAL POLYP - DR Boston Al       Immunization History:   Immunization History   Administered Date(s) Administered    Influenza Vaccine, unspecified formulation 10/24/2012, 02/07/2014    Influenza, High Dose (Fluzone 65 yrs and older) 10/24/2012, 01/07/2014, 11/04/2014, 10/07/2015, 09/28/2016    Influenza, Alex Nan, IM, (6 mo and older Fluzone, Flulaval, Fluarix and 3 yrs and older Afluria) 09/21/2017    Pneumococcal Conjugate 13-valent ([id number]) 12/04/2014    Pneumococcal Polysaccharide ([id number]) 11/10/2010, 04/08/2019       Active Problems:  Patient Active Problem List   Diagnosis Code    Degeneration of lumbar or lumbosacral intervertebral disc M51.37    Postlaminectomy syndrome, lumbar region M96.1    Osteoarthritis M19.90    CAD (coronary artery disease) I25.10    Restless legs syndrome G25.81    Sleep apnea G47.30    Chest pressure R07.89    Gastroesophageal reflux disease without esophagitis K21.9    Bloated abdomen R14.0    Diabetes ([address] E11.9    Insulin pump in place Z96.41    Headache R51.9    Pure hypercholesterolemia E78.00    Diabetic polyneuropathy ([address] E11.42    Ulcer of lower extremity ([address] L97.909    Essential hypertension I10    Mixed hyperlipidemia E78.2    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 ([address] E10.8    Soft tissue infection L08.9    Infection of spinal cord stimulator (HCC) T85.733A    CRP elevated R79.82       Isolation/Infection:   Isolation            No Isolation          Patient Infection Status       None to display            Nurse patient):  None    RN SIGNATURE:  Tonya Greco RN    CASE MANAGEMENT/SOCIAL WORK SECTION    Inpatient Status Date: ***    Readmission Risk Assessment Score:  Readmission Risk              Risk of Unplanned Readmission:  15           Discharging to Facility/ Agency   · Name:   Address:  [facility]  [address] [place] [address], [address] [place] [address]       Phone: [phone]       Fax: [phone]        ·   · Phone:  · Fax:    Dialysis Facility (if applicable)   · Name:  · Address:  · Dialysis Schedule:  · Phone:  · Fax:    / signature: Electronically signed by Carmela Atwood RN on 6/5/21 at 1:11 PM EDT    PHYSICIAN SECTION    Prognosis: Good    Condition at Discharge: [facility] Leilani Darby Patient Condition:922082489:::0}    Rehab Potential (if transferring to Rehab): {Prognosis:2263909116:::0}    Recommended Labs or Other Treatments After Discharge: ***    Physician Certification: I certify the above information and transfer of Nichole Rodrigez  is necessary for the continuing treatment of the diagnosis listed and that she requires {Admit to Appropriate Level of Care:54460:::0} for {GREATER/LESS:877303235} 30 days.      Update Admission H&P: {CHP DME Changes in HandP:554526332:::0}    PHYSICIAN SIGNATURE:  Electronically signed by Mercy Fuller MD on 6/4/21 at 2:46 PM EDT

## 2021-06-04 NOTE — PLAN OF CARE
Drain Removal Note    JANETH drain to thoracic site and left flat removed from suction, prepped with betadine. Drain suture cut and drain removed. Dry sterile occlusive dressing applied to drain sites. No complications, patient tolerated procedure well.     --  Radha España CNP  2:04 PM EDT

## 2021-06-04 NOTE — PROGRESS NOTES
[facility]  Office: [address], DO, Destiny Edgar, DO, Gina Hermann, DO, Natanael Florester Blood, DO, Marylene Freiberg, MD, Patience Bruce MD, Delores Palm MD, Dhara Julian MD, Howard Ndiaye MD, Stefanie Johansen MD, Amelia Velasquez MD, Isabelle Mendoza MD, Chani Eddy DO, Andres Vasquez MD, Erika Lau DO, Francisco Lomas MD,  Orien Dakin, DO, Brandie Lauren MD, Vy Harris MD, Kerry Farley MD, Luis Carlos Maldonado MD, Sandro Castillo, Joey Pollack, CNP, Tiffany Pinto, CNP, Fredis Riggs, CNS, Mili De Los Santoserly, CNP, Maria C Godinez, CNP, Alexandre Grant, CNP, Carmenza Park, CNP, Hannah Griffith, CNP, Cally Vaughan PA-C, Niru Person, [facility], Italo Figueroa, CNP, Rosario Wallis, CNP, Red Buckner, CNP, Licha Motley, CNP, Anthony Santana, CNP, Vane Robb, [facility]    Progress Note    6/4/2021    8:36 AM    Name:   Angelic Gan  MRN:     [id number]     Acct:      [de-identified]   Room:   [facility]9/3603-24   Day:  4  Admit Date:  5/31/2021  9:16 PM    PCP:   Jourdan Velasco MD  Code Status:  Full Code    Subjective:     C/C:left gluteal infection  Interval History Status: not changed. Patient evaluated in room lying in bed in no acute distress   Afebrile, vital signs stable   Wound VAC x2, 1 to thoracic area 1 to lumbar area, JANETH drain x2 both with bloody output  Staph aureus on surgical cultures  Blood cultures x2 NGTD  Home with home care today, MRI negative for osteo-      Brief History:     Angelic Gan is a 72 y.o. Non-/non  female who presents with wound infection and is admitted to the hospital for the management of infected implant/stimulator.       Patient presents to [address] [place] [address] emergency room with a concern of infection at the site of her spinal cord stimulator.   Patient states that she had her spinal cord stimulator battery replaced 4/16/21 and the site of the surgery is now polyethylene glycol, nicotine, acetaminophen **OR** acetaminophen, morphine, glucose, dextrose, glucagon (rDNA), dextrose, oxyCODONE-acetaminophen    Data:     Past Medical History:   has a past medical history of CAD (coronary artery disease), Cataracts, bilateral, Chest pain, Chronic back pain, Depression, Diabetes ([facility] [address] Diabetic polyneuropathy ([facility] [address] EKG abnormalities, GERD (gastroesophageal reflux disease), Headache, Hyperlipidemia, Hypertension, Hypothyroidism, Insulin pump in place, Neuropathy, Osteoarthritis, Peripheral vascular disease ([facility] [address] Pure hypercholesterolemia, Restless legs syndrome, Sleep apnea, Ulcer of lower extremity ([facility] [address] and Wears glasses. Social History:   reports that she has never smoked. She has never used smokeless tobacco. She reports that she does not drink alcohol and does not use drugs. Family History:   Family History   Problem Relation Age of Onset    Heart Disease Mother     Dementia Mother     Stroke Mother     Heart Disease Father     COPD Father     Heart Disease Brother     Arthritis Brother     Other Brother         AAA    Other Brother         AAA       Vitals:  BP (!) 119/52   Pulse 76   Temp 98 °F (36.7 °C) (Oral)   Resp 18   Ht 5' 2\" (1.575 m)   Wt 208 lb (94.3 kg)   SpO2 92%   BMI 38.04 kg/m²   Temp (24hrs), Av °F (36.7 °C), Min:97.5 °F (36.4 °C), Max:98.4 °F (36.9 °C)    Recent Labs     21  1226 21  1736 21  2106 21  0720   POCGLU 165* 162* 225* 55*       I/O (24Hr):     Intake/Output Summary (Last 24 hours) at 2021 0836  Last data filed at 2021 0612  Gross per 24 hour   Intake 500 ml   Output 2465 ml   Net -1965 ml       Labs:  Hematology:  Recent Labs     21  0945   WBC 10.8   RBC 3.65*   HGB 10.4*   HCT 33.6*   MCV 92.1   MCH 28.5   MCHC 31.0   RDW 13.1      MPV 10.3     Chemistry:  Recent Labs     21  0945      K 3.4*   CL 99   CO2 22   GLUCOSE 164*   BUN 8   CREATININE 0.61   MG 2.3

## 2021-06-05 VITALS
WEIGHT: 208 LBS | HEIGHT: 62 IN | RESPIRATION RATE: 14 BRPM | HEART RATE: 70 BPM | DIASTOLIC BLOOD PRESSURE: 90 MMHG | BODY MASS INDEX: 38.28 KG/M2 | SYSTOLIC BLOOD PRESSURE: 131 MMHG | OXYGEN SATURATION: 96 % | TEMPERATURE: 97.7 F

## 2021-06-05 LAB
GLUCOSE BLD-MCNC: 110 MG/DL (ref 65–105)
GLUCOSE BLD-MCNC: 193 MG/DL (ref 65–105)

## 2021-06-05 PROCEDURE — 6370000000 HC RX 637 (ALT 250 FOR IP): Performed by: STUDENT IN AN ORGANIZED HEALTH CARE EDUCATION/TRAINING PROGRAM

## 2021-06-05 PROCEDURE — 2580000003 HC RX 258: Performed by: INTERNAL MEDICINE

## 2021-06-05 PROCEDURE — 82947 ASSAY GLUCOSE BLOOD QUANT: CPT

## 2021-06-05 PROCEDURE — 99239 HOSP IP/OBS DSCHRG MGMT >30: CPT | Performed by: FAMILY MEDICINE

## 2021-06-05 PROCEDURE — 6360000002 HC RX W HCPCS: Performed by: INTERNAL MEDICINE

## 2021-06-05 PROCEDURE — 2580000003 HC RX 258: Performed by: STUDENT IN AN ORGANIZED HEALTH CARE EDUCATION/TRAINING PROGRAM

## 2021-06-05 PROCEDURE — 6370000000 HC RX 637 (ALT 250 FOR IP): Performed by: NURSE PRACTITIONER

## 2021-06-05 PROCEDURE — 99232 SBSQ HOSP IP/OBS MODERATE 35: CPT | Performed by: INTERNAL MEDICINE

## 2021-06-05 PROCEDURE — 6360000002 HC RX W HCPCS: Performed by: STUDENT IN AN ORGANIZED HEALTH CARE EDUCATION/TRAINING PROGRAM

## 2021-06-05 RX ADMIN — ASPIRIN 81 MG: 81 TABLET, CHEWABLE ORAL at 09:12

## 2021-06-05 RX ADMIN — GABAPENTIN 400 MG: 400 CAPSULE ORAL at 09:12

## 2021-06-05 RX ADMIN — METOPROLOL TARTRATE 25 MG: 25 TABLET ORAL at 09:13

## 2021-06-05 RX ADMIN — SODIUM CHLORIDE, PRESERVATIVE FREE 10 ML: 5 INJECTION INTRAVENOUS at 09:12

## 2021-06-05 RX ADMIN — METOCLOPRAMIDE 5 MG: 5 TABLET ORAL at 09:12

## 2021-06-05 RX ADMIN — LEVOTHYROXINE SODIUM 88 MCG: 88 TABLET ORAL at 07:27

## 2021-06-05 RX ADMIN — FOLIC ACID 1 MG: 1 TABLET ORAL at 09:12

## 2021-06-05 RX ADMIN — CEFTRIAXONE SODIUM 2000 MG: 2 INJECTION, POWDER, FOR SOLUTION INTRAMUSCULAR; INTRAVENOUS at 12:39

## 2021-06-05 RX ADMIN — SODIUM CHLORIDE 25 ML: 9 INJECTION, SOLUTION INTRAVENOUS at 12:38

## 2021-06-05 RX ADMIN — ISOSORBIDE MONONITRATE 60 MG: 60 TABLET ORAL at 09:12

## 2021-06-05 RX ADMIN — SERTRALINE 100 MG: 50 TABLET, FILM COATED ORAL at 09:13

## 2021-06-05 RX ADMIN — ENOXAPARIN SODIUM 40 MG: 40 INJECTION, SOLUTION INTRAVENOUS; SUBCUTANEOUS at 09:12

## 2021-06-05 RX ADMIN — GABAPENTIN 400 MG: 400 CAPSULE ORAL at 14:03

## 2021-06-05 RX ADMIN — PANTOPRAZOLE SODIUM 40 MG: 40 TABLET, DELAYED RELEASE ORAL at 07:27

## 2021-06-05 ASSESSMENT — ENCOUNTER SYMPTOMS
EYE DISCHARGE: 0
TROUBLE SWALLOWING: 0
DIARRHEA: 0
EYE REDNESS: 0
SORE THROAT: 0
VOMITING: 0
SHORTNESS OF BREATH: 0
COUGH: 0
CONSTIPATION: 0
WHEEZING: 0
BACK PAIN: 1
PHOTOPHOBIA: 0
NAUSEA: 0
ABDOMINAL PAIN: 0
BLOOD IN STOOL: 0
RHINORRHEA: 0
SINUS PAIN: 0

## 2021-06-05 NOTE — CARE COORDINATION
Transition planning   Sent referrals to 5 home care companies listed that serves [facility] can not due to staffing-  Called May at [facility] - left message refaxed referral as they were not able to get yesterday but I sent her all required information yesterday- sent referral to [facility] [employer] [facility] [employer] [facility] as a back up - will call home health to see if any one can take -   Perfect served DR as it is script says 2000 mg q 12 and she was getting 2000 daily here   Mohinder Villalba returned call-   Order refaxed to [employer] after speaking with Mohinder Villalba - They are still running benefits on pt but will be able to start tomorrow she request that family watch video on [email or web address](pt care giver education on IV push )-will add to discharge instructions. Need to still get home care but if they can even start on Monday for dressing changes and lab work - will reach out to [facility] that declined due to staffing if need be for later start- still waiting for will check on referrals that are out.  Faxed script to [phone] May calls they are able to accept pt and will cover nursing home care needs/ insurance info not backe will know full coverage Monday but will work with pt and cost of medicine is low 83 dollars informed pt and let her know that they will call for intake today

## 2021-06-05 NOTE — PROGRESS NOTES
[facility] -   Infectious diseases evaluation  admission date 5/31/2021    reason for consultation:   Infection of spinal cord stimulator    Impression :   Current:  · Infection of spinal cord stimulator  · 6/1 explant stimulator and wash out  · Cultures growing S. Aureus MSSA, no bacteremia  · T spine epidural abscess, site of the past lead , no OM or diskitis on MRI 6/3  · Elevated CRP: 168.2  · Elevated lactate-resolved    Other:  Diabetes type 2  Allergy zithromax - itching , no rash    Discussion / summary of stay / plan of care   · 4/16 Spinal cord stimulator placed, leads in the T spine Dr Stacie Esposito anesthesia  · 5/31 Erythema, warmth, swelling, pain, and purulent discharge from site  · T spine sx site red tender- CT T spine no tenderness  · Explant stimulator and wash out 6/1  · MRI: Small fluid collection in the posterior epidural space at T6-7 and T7-T8 with some surrounding enhancement.  While this could be postprocedural, abscess cannot be excluded. Not suggestive of OM  Recommendations   · DC on ceftriaxone 2 g daily  least 2 weeks and likely 4 weeks till 7/1 to allow full resolution of the epidural abscess  · Ok for DC w midline    Infection Control Recommendations   · [place] Precautions    Antimicrobial Stewardship Recommendations   · Simplification of therapy  · Targeted therapy  · Per Kg dosing  Coordination ofOutpatient Care:   · Estimated Length of IV antimicrobials:  · Patient will need Midline / picc Catheter Insertion:   · Patient will need SNF:  · Patient will need outpatient wound care:     History of Present Illness:   Initial history:  Rachna Delatorre is a 72y.o.-year-old female presented to the ED 5/31 for wound infection. Patient recently had a back stimulator placed by Dr. Sara Gottlieb 4/16/21.  She states she went for a battery change yesterday and her pain doctor sent her here for erythema, warmth, swelling, and some purulent discharge from the site. Patient complains of pain and tenderness in her low back. She states she is also having issues of getting around due to the pain. Patient also has an insulin pump. Scheduled for surgery today to explant stimulator and wash out    6/1 on exam the T spine site is red and tender - the pacer site is bulging very tender and red -no fever associated    Interval changes  6/5/2021   Patient Vitals for the past 8 hrs:   BP Temp Temp src Pulse Resp   06/05/21 0800 (!) 121/58 97.7 °F (36.5 °C) Oral 72 14       went for surgery 6/1 and had the pain pump removed, pus was found at the tip of the our wires in the spinal area. Pus was also found in the pocket of the pain pump    MRI: Small fluid collection in the posterior epidural space at T6-7 and T7-T8 with some surrounding enhancement.  While this could be postprocedural,  abscess cannot be excluded. No marrow signal abnormality to suggest discitis/osteomyelitis. 6/1 OR cultures are growing MSSA  [facility] 5/31 remain neg    Disc w NS, no great suspicion for deep spinal infection- No indication for further surgery based on mri    No fevers overnight  Planning on discharge today  JANETH drain removed yesterday  Mild back pain overnight    Summary of relevant labs:  Labs:  WBC: 9.5--10.8  Creat 0.77  Elevated CRP: 168.2  Elevated lactate: 2.6--> 1.3      Micro:  [facility] 5/31 neg  Spinal pain unit leads cx MSSA  pain generator cx MSSA    Imaging:  MRI Thoracic Spine: .   1. Small fluid collection in the posterior epidural space at T6-7 and T7-T8 with some surrounding enhancement.  While this could be postprocedural,  abscess cannot be excluded. Ceil Iza is enhancing soft tissue in the epidural space extending to the T9-10 level, which could be postoperative or related  to phlegmon.  No subcutaneous abscess is identified. 2. Multilevel degenerative changes of the thoracic spine. 3. No marrow signal abnormality to suggest discitis/osteomyelitis.     CT Thoracic Spine: Spinal cord stimulator discharge. Conjunctiva/sclera: Conjunctivae normal.   Cardiovascular:      Rate and Rhythm: Normal rate and regular rhythm. Heart sounds: Normal heart sounds. No murmur heard. Pulmonary:      Effort: Pulmonary effort is normal. No respiratory distress. Breath sounds: Normal breath sounds. No wheezing. Abdominal:      General: There is no distension. Palpations: There is no mass. Tenderness: There is no abdominal tenderness. There is no guarding. Genitourinary:     Comments: No fleming  Musculoskeletal:         General: No swelling, deformity or signs of injury. Cervical back: Normal range of motion and neck supple. Right lower leg: No edema. Left lower leg: No edema. Skin:     General: Skin is warm. Coloration: Skin is not jaundiced or pale. Findings: Wound present. No bruising. Comments: Midline thoracic and left lumbar wound vacs in place   Neurological:      General: No focal deficit present. Mental Status: She is alert and oriented to person, place, and time. Psychiatric:         Mood and Affect: Mood normal.         Behavior: Behavior normal.         Past Medical History:     Past Medical History:   Diagnosis Date    CAD (coronary artery disease)     \"40 % blockage lower heart\"    Cataracts, bilateral     Chest pain     pressure    Chronic back pain     Depression     Diabetes ([address]     Diabetic polyneuropathy ([address]     EKG abnormalities 11/14/2016    RT. BBB    GERD (gastroesophageal reflux disease)     Headache     Hyperlipidemia     Hypertension     Hypothyroidism     Insulin pump in place     Neuropathy     Osteoarthritis     Peripheral vascular disease (HCC)     Pure hypercholesterolemia     Restless legs syndrome     Sleep apnea     Ulcer of lower extremity ([address]     Wears glasses        Past Surgical  History:     Past Surgical History:   Procedure Laterality Date    BACK SURGERY      cage in place at L5-S1., SPINAL CORD STIMULATOR    CARPAL TUNNEL RELEASE Bilateral     CATARACT REMOVAL WITH IMPLANT Right 05/16/2017    CATARACT REMOVAL WITH IMPLANT Left 06/13/2017    DR SHARON HERRERA    CHOLECYSTECTOMY      COLONOSCOPY  07/13/2004    normal    COLONOSCOPY  09/14/2016    no lesions seen, spasms sigmoid colon    COLONOSCOPY  01/14/2021    DR KARLEY MARTINS-    CYST REMOVAL Right     GANGLION CYST REMOVED.  ENDOSCOPY, COLON, DIAGNOSTIC      EGD    JOINT REPLACEMENT Left     knee    OTHER SURGICAL HISTORY  11/21/2016    Revision of spinal cord stimulator    SPINAL CORD STIMULATOR SURGERY N/A 4/16/2021    SPINAL CORD STIMULATOR  BATTERY REPLACEMENT - ABBOTT  -   dr reinaldo huang please performed by Enoc Tarango MD at [address] N/A 6/1/2021    EXPLANT PADDLE SPINAL CORD STIMULATOR, I & D OF WOUND performed by Ludwin Perera DO at [address] ENDOSCOPY  08/16/2016    NO LESIONS SEEN    UPPER GASTROINTESTINAL ENDOSCOPY  01/14/2021    ESOPHAGEAL POLYP - DR Theresa MARTINS       Medications:      cefTRIAXone (ROCEPHIN) IV  2,000 mg Intravenous Q24H    aspirin  81 mg Oral Daily    folic acid  1 mg Oral Daily    levothyroxine  88 mcg Oral Daily    sodium chloride flush  5-40 mL Intravenous 2 times per day    enoxaparin  40 mg Subcutaneous Daily    atorvastatin  40 mg Oral Nightly    gabapentin  400 mg Oral TID    isosorbide mononitrate  60 mg Oral Daily    metoprolol tartrate  25 mg Oral BID    pantoprazole  40 mg Oral QAM AC    sertraline  100 mg Oral Daily    metoclopramide  5 mg Oral BID    insulin lispro  0-6 Units Subcutaneous TID WC    insulin lispro  0-3 Units Subcutaneous Nightly       Social History:     Social History     Socioeconomic History    Marital status:       Spouse name: Not on file    Number of children: Not on file    Years of education: Not on file    Highest education level: Not on file Occupational History    Not on file   Tobacco Use    Smoking status: Never Smoker    Smokeless tobacco: Never Used   Vaping Use    Vaping Use: Never used   Substance and Sexual Activity    Alcohol use: No    Drug use: No    Sexual activity: Not on file   Other Topics Concern    Not on file   Social History Narrative    Not on file     Social Determinants of Health     Financial Resource Strain: Low Risk     Difficulty of Paying Living Expenses: Not hard at all   Food Insecurity: No Food Insecurity    Worried About [address] in the Last Year: Never true    [address] [facility] [employer] in the Last Year: Never true   Transportation Needs:     Lack of Transportation (Medical):      Lack of Transportation (Non-Medical):    Physical Activity:     Days of Exercise per Week:     Minutes of Exercise per Session:    Stress:     Feeling of Stress :    Social Connections:     Frequency of Communication with Friends and Family:     Frequency of Social Gatherings with Friends and Family:     Attends Taoism Services:     Active Member of Clubs or Organizations:     Attends Club or Organization Meetings:     Marital Status:    Intimate Partner Violence:     Fear of Current or Ex-Partner:     Emotionally Abused:     Physically Abused:     Sexually Abused:        Family History:     Family History   Problem Relation Age of Onset    Heart Disease Mother     Dementia Mother     Stroke Mother     Heart Disease Father     COPD Father     Heart Disease Brother     Arthritis Brother     Other Brother         AAA    Other Brother         AAA      Medical Decision Making:   I have independently reviewed/ordered the following labs:    CBC with Differential:   Recent Labs     06/03/21  0945   WBC 10.8   HGB 10.4*   HCT 33.6*        BMP:  Recent Labs     06/03/21  0945      K 3.4*   CL 99   CO2 22   BUN 8   CREATININE 0.61   MG 2.3     Hepatic Function Panel:   No results for input(s): PROT, LABALBU, BILIDIR, IBILI, BILITOT, ALKPHOS, ALT, AST in the last 72 hours. No results for input(s): RPR in the last 72 hours. No results for input(s): HIV in the last 72 hours. No results for input(s): BC in the last 72 hours. Lab Results   Component Value Date    CREATININE 0.61 06/03/2021    GLUCOSE 164 06/03/2021    GLUCOSE 310 10/13/2011       Detailed results: Thank you for allowing us to participate in the care of this patient. Please call with questions. This note is created with the assistance of a speech recognition program.  While intending to generate adocument that actually reflects the content of the visit, the document can still have some errors including those of syntax and sound a like substitutions which may escape proof reading. It such instances, actual meaningcan be extrapolated by contextual diversion. Jovan Ruiz, [address] [place] [address]  Office: [phone]  Perfect serve / office [phone]    I have discussed the care of the patient, including pertinent history and exam findings,  with the student. I have seen and examined the patient and the key elements of all parts of the encounter have been performed by me.   I agree with the assessment, plan and orders as documented by the student    Kitty Irma, Infectious Diseases

## 2021-06-06 LAB
CULTURE: ABNORMAL
CULTURE: NORMAL
CULTURE: NORMAL
DIRECT EXAM: ABNORMAL
Lab: ABNORMAL
Lab: NORMAL
Lab: NORMAL
SPECIMEN DESCRIPTION: ABNORMAL
SPECIMEN DESCRIPTION: NORMAL
SPECIMEN DESCRIPTION: NORMAL

## 2021-06-06 ASSESSMENT — ENCOUNTER SYMPTOMS: EYE PAIN: 0

## 2021-06-07 ENCOUNTER — CARE COORDINATION (OUTPATIENT)
Dept: CASE MANAGEMENT | Age: 66
End: 2021-06-07

## 2021-06-07 LAB — INTERVENTION: NORMAL

## 2021-06-07 RX ORDER — LOSARTAN POTASSIUM AND HYDROCHLOROTHIAZIDE 12.5; 5 MG/1; MG/1
1 TABLET ORAL DAILY
COMMUNITY

## 2021-06-07 NOTE — CARE COORDINATION
[address] Transitions Initial Follow Up Call    Call within 2 business days of discharge: Yes    Patient: Jojo Hayden Patient : 1955   MRN: [id number]  Reason for Admission: Wound infection  Discharge Date: 21 RARS: Readmission Risk Score: 14      Last Discharge [facility]       Complaint Diagnosis Description Type Department Provider    21   Admission (Discharged) [address] [facility] Delphine Holland MD    21 Wound Infection Wound infection complicating hardware, initial encounter [facility]) ED (TRANSFER) [facility] ED Mc John MD           Spoke with: Patient     Facility: [facility]    Non-face-to-face services provided:  Scheduled appointment with [facility] 21 with neurosurgeon  Obtained and reviewed discharge summary and/or continuity of care documents     Spoke with patient who states she is feeling good today. She denies any pain, denies any f/c, n/v or other s/s of worsening infection. She had a spinal cord stimulator that became infected to left flank area. She has a wound vac currently and was told when batteries die she can remove the would vac. She is getting daily Rocephin, spoke with Elysia from [place] who will be doing lab draws and IV dressing changes. Patient states she is getting around at home well, lives with daughter who is a MA. She has follow up with neurosurgeon, expressed they would like her to follow up with PCP within 7 days and to follow up with ID. She declined assistance with scheduling either of these appointments. She has gotten the COVID vaccine, denies any viral symptoms. She did say she lost her Shaka reader at the hospital. I called to [address] and they did not have. I called security and they did not have either. I recommended she call her endocrinologist for script for new one. She denies any other needs or concerns. Transitions of Care Initial Call    Was this an external facility discharge?  No Discharge Facility: [facility]. [place]    Challenges to be reviewed by the provider   Additional needs identified to be addressed with provider: No  none             Method of communication with provider : none      Advance Care Planning:   Does patient have an Advance Directive:  reviewed and current. Was this a readmission? No  Patient stated reason for admission: infection to left flank    Patients top risk factors for readmission: medical condition-Spinal cord stimulator infection    Care Transition Nurse (CTN) contacted the patient by telephone to perform post hospital discharge assessment. Verified name and  with patient as identifiers. Provided introduction to self, and explanation of the CTN role. CTN reviewed discharge instructions, medical action plan and red flags with patient who verbalized understanding. Patient given an opportunity to ask questions and does not have any further questions or concerns at this time. Were discharge instructions available to patient? Yes. Reviewed appropriate site of care based on symptoms and resources available to patient including: PCP and Specialist. The patient agrees to contact the PCP office for questions related to their healthcare. Medication reconciliation was performed with patient, who verbalizes understanding of administration of home medications. Advised obtaining a 90-day supply of all daily and as-needed medications. Covid Risk Education     Educated patient about risk for severe COVID-19 due to risk factors according to CDC guidelines. CTN reviewed discharge instructions, medical action plan and red flag symptoms with the patient who verbalized understanding. Discussed COVID vaccination status: Yes. Education provided on COVID-19 vaccination as appropriate. Discussed exposure protocols and quarantine with CDC Guidelines.  Patient was given an opportunity to verbalize any questions and concerns and agrees to contact CTN or health care provider for questions related to their healthcare. Reviewed and educated patient on any new and changed medications related to discharge diagnosis. Was patient discharged with a pulse oximeter? No     CTN provided contact information. Plan for follow-up call in 5-7 days based on severity of symptoms and risk factors. Care Transitions 24 Hour Call    Schedule Follow Up Appointment with PCP: Declined  Do you have any ongoing symptoms?: No  Do you have a copy of your discharge instructions?: Yes  Do you have all of your prescriptions and are they filled?: Yes  Have you been contacted by a [facility] Pharmacist?: No  Have you scheduled your follow up appointment?: No  Were you discharged with any Home Care or Post Acute Services: Yes  Post Acute Services:  Outpatient/Community Services (Comment: [facility] )  Do you feel like you have everything you need to keep you well at home?: Yes  Care Transitions Interventions         Follow Up  Future Appointments   Date Time Provider Madonna Cramer   6/21/2021 10:30 AM DO Mikael Nazario Neuro Mady Barrios RN

## 2021-06-21 ENCOUNTER — OFFICE VISIT (OUTPATIENT)
Dept: NEUROSURGERY | Age: 66
End: 2021-06-21

## 2021-06-21 VITALS — DIASTOLIC BLOOD PRESSURE: 67 MMHG | RESPIRATION RATE: 16 BRPM | HEART RATE: 76 BPM | SYSTOLIC BLOOD PRESSURE: 107 MMHG

## 2021-06-21 DIAGNOSIS — I96 NECROTIC ESCHAR (HCC): ICD-10-CM

## 2021-06-21 DIAGNOSIS — T85.733D INFECTION OF SPINAL CORD STIMULATOR, SUBSEQUENT ENCOUNTER: Primary | ICD-10-CM

## 2021-06-21 PROCEDURE — 99024 POSTOP FOLLOW-UP VISIT: CPT | Performed by: NEUROLOGICAL SURGERY

## 2021-06-21 NOTE — PROGRESS NOTES
Temo Herrera  MOB # [address] [address]  Dept: [phone]    Patient:  Anabel Barriga  YOB: 1955  Date: 6/21/21    The patient is a 72 y.o. female who presents today for consult of the following problems:     Chief Complaint   Patient presents with    Post-Op Check             HPI:     Anabel Barriga is a 72 y.o. female on whom neurosurgical consultation was requested by Ta De Dios MD for management of infection. Denies fevers chills, nausea. Emesis. No new numbness tingling weakness. No drainage. Controlled pain    History:     Past Medical History:   Diagnosis Date    CAD (coronary artery disease)     \"40 % blockage lower heart\"    Cataracts, bilateral     Chest pain     pressure    Chronic back pain     Depression     Diabetes ([address]     Diabetic polyneuropathy ([address]     EKG abnormalities 11/14/2016    RT. BBB    GERD (gastroesophageal reflux disease)     Headache     Hyperlipidemia     Hypertension     Hypothyroidism     Insulin pump in place     Neuropathy     Osteoarthritis     Peripheral vascular disease (HCC)     Pure hypercholesterolemia     Restless legs syndrome     Sleep apnea     Ulcer of lower extremity ([address]     Wears glasses      Past Surgical History:   Procedure Laterality Date    BACK SURGERY      cage in place at L5-S1., SPINAL CORD STIMULATOR    CARPAL TUNNEL RELEASE Bilateral     CATARACT REMOVAL WITH IMPLANT Right 05/16/2017    CATARACT REMOVAL WITH IMPLANT Left 06/13/2017    DR SHARON HERRERA    CHOLECYSTECTOMY      COLONOSCOPY  07/13/2004    normal    COLONOSCOPY  09/14/2016    no lesions seen, spasms sigmoid colon    COLONOSCOPY  01/14/2021    DR KARLEY MARTINS-    CYST REMOVAL Right     GANGLION CYST REMOVED.     ENDOSCOPY, COLON, DIAGNOSTIC      EGD    JOINT REPLACEMENT Left     knee    OTHER SURGICAL HISTORY  11/21/2016    Revision of spinal cord stimulator    SPINAL CORD STIMULATOR SURGERY N/A 4/16/2021    SPINAL CORD STIMULATOR  Sebastian Corley  -    requests closer please performed by Severino Coles MD at [address] N/A 6/1/2021    EXPLANT PADDLE SPINAL CORD STIMULATOR, I & D OF WOUND performed by Henry Willingham DO at [address] ENDOSCOPY  08/16/2016    NO LESIONS SEEN    UPPER GASTROINTESTINAL ENDOSCOPY  01/14/2021    ESOPHAGEAL POLYP - DR Prasanna Guevara     Family History   Problem Relation Age of Onset    Heart Disease Mother     Dementia Mother     Stroke Mother     Heart Disease Father     COPD Father     Heart Disease Brother     Arthritis Brother     Other Brother         AAA    Other Brother         AAA     Current Outpatient Medications on File Prior to Visit   Medication Sig Dispense Refill    losartan-hydroCHLOROthiazide (HYZAAR) 50-12.5 MG per tablet Take 1 tablet by mouth daily Indications: was stopped at discharge on 6/5, patient did not want to stop      cefTRIAXone (ROCEPHIN) infusion Infuse 2,000 mg intravenously every 24 hours for 28 days L:FT CRP cbc diff creat weekly  No line draws pls  Compound per protocol 56 g 0    pantoprazole (PROTONIX) 40 MG tablet Take 1 tablet by mouth every morning (before breakfast) 90 tablet 1    Calcium Carb-Cholecalciferol (CALCIUM/VITAMIN D) 600-400 MG-UNIT TABS Take one tab po bid 60 tablet 5    furosemide (LASIX) 20 MG tablet take 1 tablet by mouth two times a week if needed for water retention 30 tablet 2    sertraline (ZOLOFT) 100 MG tablet take 1 tablet by mouth once daily 90 tablet 0    Continuous Blood Gluc Sensor (FREESTYLE SWETHA 14 DAY SENSOR) [facility] [place] SENSOR TO THE BACK OF ARM. REMOVE AND REPLACE EVERY 14 DAYS.  USE WITH DEVICE TO MONITOR BLOOD SUGAR 1 each 1    atorvastatin (LIPITOR) 40 MG tablet take 1 tablet by mouth once daily 90 tablet 1    metoclopramide (REGLAN) 5 MG tablet take 1 tablet by mouth twice a day 60 tablet 5    insulin aspart (NOVOLOG) 100 UNIT/ML injection vial INJECT 60 UNITS VIA PUMP ONCE DAILY 60 mL 3    levothyroxine (SYNTHROID) 88 MCG tablet take 1 tablet by mouth once daily 90 tablet 2    isosorbide mononitrate (IMDUR) 60 MG extended release tablet take 1 tablet by mouth once daily 90 tablet 0    metoprolol tartrate (LOPRESSOR) 25 MG tablet take 1 tablet by mouth twice a day 60 tablet 0    HYDROcodone-acetaminophen (NORCO) 7.5-325 MG per tablet take 1 tablet by mouth every 6 hours if needed  0    sucralfate (CARAFATE) 1 GM tablet Take 1 tablet by mouth 4 times daily 120 tablet 2    aspirin 81 MG tablet Take by mouth daily      FOLIC ACID PO Take by mouth      cyclobenzaprine (FLEXERIL) 5 MG tablet Take 5 mg by mouth 3 times daily as needed for Muscle spasms      gabapentin (NEURONTIN) 400 MG capsule Take 400 mg by mouth 3 times daily      Omega-3 Fatty Acids (FISH OIL) 1000 MG CPDR Take  by mouth. No current facility-administered medications on file prior to visit. Social History     Tobacco Use    Smoking status: Never Smoker    Smokeless tobacco: Never Used   Vaping Use    Vaping Use: Never used   Substance Use Topics    Alcohol use: No    Drug use: No       Allergies   Allergen Reactions    Actos [Pioglitazone] Other (See Comments)     Weight gain    Lisinopril Other (See Comments)     cough    Metformin And Related Diarrhea    Zithromax [Azithromycin] Itching       Review of Systems  Constitutional: Negative for activity change and appetite change. HENT: Negative for ear pain and facial swelling. Eyes: Negative for discharge and itching. Respiratory: Negative for choking and chest tightness. Cardiovascular: Negative for chest pain and leg swelling. Gastrointestinal: Negative for nausea and abdominal pain. Endocrine: Negative for cold intolerance and heat intolerance. Genitourinary: Negative for frequency and flank pain. Musculoskeletal: Negative for myalgias and joint swelling. Skin: Negative for rash and wound. Allergic/Immunologic: Negative for environmental allergies and food allergies. Hematological: Negative for adenopathy. Does not bruise/bleed easily. Psychiatric/Behavioral: Negative for self-injury. The patient is not nervous/anxious. Physical Exam:      /67 (Site: Right Upper Arm, Position: Sitting, Cuff Size: Large Adult)   Pulse 76   Resp 16   Estimated body mass index is 38.04 kg/m² as calculated from the following:    Height as of 5/31/21: 5' 2\" (1.575 m). Weight as of 6/3/21: 208 lb (94.3 kg). General:  Whitley Fuentes is a 72y.o. year old female who appears her stated age. HEENT: Normocephalic atraumatic. Neck supple. Chest: regular rate; pulses equal  Abdomen: Soft nontender nondistended. Normoactive bowel sounds.   Ext: DP and PT pulses 2+, good cap refill  Neuro    Mentation  Appropriate affect  Registration intact  Orientation intact  3 item recall intact  Judgement intact to situation    Cranial Nerves:   Pupils equal and reactive to light  Extraocular motion intact  Face and shrug symmetric  Tongue midline  No dysarthria  v1-3 sensation symmetric, masseter tone symmetric  Hearing symmetric and intact to finger rub    Sensation:   intact    Motor  L deltoid 5/5; R deltoid 5/5  L biceps 5/5; R biceps 5/5  L triceps 5/5; R triceps 5/5  L wrist extension 5/5; R wrist extension 5/5  L intrinsics 5/5; R intrinsics 5/5     L iliopsoas 5/5 , R iliopsoas 5/5  L quadriceps 5/5; R quadriceps 5/5  L Dorsiflexion 5/5; R dorsiflexion 5/5  L Plantarflexion 5/5; R plantarflexion 5/5  L EHL 5/5; R EHL 5/5    Reflexes  L Brachioradialis 2+/4; R brachioradialis 2+/4  L Biceps 2+/4; R Biceps 2+/4  L Triceps 2+/4; R Triceps 2+/4  L Patellar 2+/4: R Patellar 2+/4  L Achilles 2+/4; R Achilles 2+/4    hoffmans L: neg  hoffmans R: neg  Clonus L: neg  Clonus R: neg  Babinski L: up  Babinski R; up    Healed midline and left flank incision - sutures removed. Eschar x 2 1x1 and 2x2cm adjacent midway between the 2 sites. Studies Review:     none    Assessment and Plan:      1. Infection of spinal cord stimulator, subsequent encounter    2. Necrotic eschar ([address]          Plan: continue IV anbx; follow region of eschar for demarcation and further debridement if needed. Will reach out to plastics regarding regions of eschar    F/u 8wks with pretty    Followup: No follow-ups on file. Prescriptions Ordered:  No orders of the defined types were placed in this encounter. Orders Placed:  No orders of the defined types were placed in this encounter. Electronically signed by Tegan Gonzalez DO on 6/21/2021 at 11:19 AM    Please note that this chart was generated using voice recognition Dragon dictation software. Although every effort was made to ensure the accuracy of this automated transcription, some errors in transcription may have occurred.

## 2021-06-23 ENCOUNTER — TELEPHONE (OUTPATIENT)
Dept: INFECTIOUS DISEASES | Age: 66
End: 2021-06-23

## 2021-06-23 DIAGNOSIS — E87.6 HYPOKALEMIA: Primary | ICD-10-CM

## 2021-06-23 DIAGNOSIS — I10 ESSENTIAL HYPERTENSION: ICD-10-CM

## 2021-06-23 NOTE — TELEPHONE ENCOUNTER
Elysia from  called and stated that the patient have a spot on her back that is swollen and is very painful, she stated that it is red and have a lump about 4-5 cm wide. Spoke to Dr Deejay Alford and she called the  nurse and gave orders to watch for drainage if so get an culture in send the results to our office.

## 2021-06-24 RX ORDER — FUROSEMIDE 20 MG/1
TABLET ORAL
Qty: 30 TABLET | Refills: 2 | Status: SHIPPED | OUTPATIENT
Start: 2021-06-24

## 2021-06-24 NOTE — TELEPHONE ENCOUNTER
Message conveyed to the patient and will fax lab order to the [facility] on [address] at [phone]

## 2021-06-30 ENCOUNTER — TELEPHONE (OUTPATIENT)
Dept: FAMILY MEDICINE CLINIC | Age: 66
End: 2021-06-30

## 2021-06-30 NOTE — TELEPHONE ENCOUNTER
FYI:  Patient called stating she had a stimulator put into her back by Dr. Mary Beth Zarco and has been seeing infectious disease doctor Irma (whom is not in office this week). There is a spot on her back that is infected, redness, hot to touch. She is currently on IV pic line antibiotic ceftriaxone. The home care nurse said it looks bad. I told her to call surgeon to see if that antibiotic would take care of possible infection in back, or she may need to go to ER. Patient agreed.

## 2021-07-02 LAB — POTASSIUM (K+): 3.8

## 2021-07-02 NOTE — TELEPHONE ENCOUNTER
Vasyl Brown MD  2021 2:17 PM  Darlene Ashley  1955    Mickey Jameson from [address] [place], [place] [address] called and ask if the picc line could be removed Please advise Lili  Read 2021 4:14 PM  2021 4:14 PM  Send me impression n plan  2021 8:41 AM  reason for consultation:  Infection of spinal cord stimulator     Impression :  Current:  · Infection of spinal cord stimulator  ?  explant stimulator and wash out  ? Cultures growing S. Aureus MSSA, no bacteremia  · T spine epidural abscess, site of the past lead , no OM or diskitis on MRI 6/3  · Elevated CRP: 168.2  · Elevated lactate-resolved     Other:  Diabetes type 2  Allergy zithromax - itching , no rash     Discussion / summary of stay / plan of care  ·  Spinal cord stimulator placed, leads in the T spine Dr Batsheva Newman anesthesia  ·  Erythema, warmth, swelling, pain, and purulent discharge from site  · T spine sx site red tender- CT T spine no tenderness  · Explant stimulator and wash out   · MRI: Small fluid collection in the posterior epidural space at T6-7 and T7-T8 with some surrounding enhancement. While this could be postprocedural, abscess cannot be excluded. Not suggestive of OM  Recommendations  · DC on ceftriaxone 2 g daily least 2 weeks and likely 4 weeks till  to allow full resolution of the epidural abscess  · Ok for DC w midline  Read 2021 9:04 AM  2021 9:13 AM  Can u for her with Gabriela Long or sexton today ? She has a drain site still open and infected.   2021 9:14 AM  Let me know ASAP pls    The patient is going to the ED

## 2021-07-07 DIAGNOSIS — E87.6 HYPOKALEMIA: ICD-10-CM

## 2021-07-16 DIAGNOSIS — R74.8 ELEVATED LIPASE: ICD-10-CM

## 2021-07-19 ENCOUNTER — TELEPHONE (OUTPATIENT)
Dept: INFECTIOUS DISEASES | Age: 66
End: 2021-07-19

## 2021-07-19 NOTE — TELEPHONE ENCOUNTER
Elysia from [facility] called. Patient completed her antibiotics and they want to tknow if they can pull the PICC line.  ANA LILIA

## 2021-07-26 ENCOUNTER — OFFICE VISIT (OUTPATIENT)
Dept: INFECTIOUS DISEASES | Age: 66
End: 2021-07-26
Payer: COMMERCIAL

## 2021-07-26 VITALS
HEART RATE: 82 BPM | DIASTOLIC BLOOD PRESSURE: 73 MMHG | HEIGHT: 62 IN | OXYGEN SATURATION: 96 % | SYSTOLIC BLOOD PRESSURE: 116 MMHG | WEIGHT: 190 LBS | TEMPERATURE: 97.3 F | RESPIRATION RATE: 14 BRPM | BODY MASS INDEX: 34.96 KG/M2

## 2021-07-26 DIAGNOSIS — A49.01 MSSA (METHICILLIN SUSCEPTIBLE STAPHYLOCOCCUS AUREUS) INFECTION: ICD-10-CM

## 2021-07-26 DIAGNOSIS — T81.49XA SURGICAL WOUND INFECTION: Primary | ICD-10-CM

## 2021-07-26 PROCEDURE — 99214 OFFICE O/P EST MOD 30 MIN: CPT | Performed by: INTERNAL MEDICINE

## 2021-07-26 RX ORDER — CEPHALEXIN 500 MG/1
500 CAPSULE ORAL 4 TIMES DAILY
Qty: 56 CAPSULE | Refills: 0 | Status: SHIPPED | OUTPATIENT
Start: 2021-07-26 | End: 2021-08-09

## 2021-07-26 ASSESSMENT — ENCOUNTER SYMPTOMS
APNEA: 0
BACK PAIN: 1
COLOR CHANGE: 0
ABDOMINAL DISTENTION: 0
EYE DISCHARGE: 0

## 2021-07-26 NOTE — PROGRESS NOTES
[facility] - Initial Consult Note  Today's Date: 7/26/2021    Impression :   · Infection of spinal cord stimulator  ? 6/1 explant stimulator and wash out  ? Cultures growing S. Aureus MSSA, no bacteremia  · T spine epidural abscess, site of the past lead , no OM or diskitis on MRI 6/3  · Elevated CRP: 168.2  · Elevated lactate-resolved     Other:  Diabetes type 2  Allergy zithromax - itching , no rash    Recommendations   · Keflex 4 x per day x 2 weeks  · Collagenase x 2 weeks on wound  · See me in a week  · No fluctuation today and hence will not attempt to drain     Diagnosis Orders   1. Surgical wound infection  collagenase (SANTYL) 250 UNIT/GM ointment    cephALEXin (KEFLEX) 500 MG capsule   2. MSSA (methicillin susceptible Staphylococcus aureus) infection  collagenase (SANTYL) 250 UNIT/GM ointment    cephALEXin (KEFLEX) 500 MG capsule       Return in about 1 week (around 8/2/2021). History of Present Illness:   Rex Raygoza is a 72y.o.-year-old  female who presents with   Chief Complaint   Patient presents with    Wound Infection     spinal, pt wants to know the next step to improving infection   impression  · Infection of spinal cord stimulator  ? 6/1 explant stimulator and wash out  ? Cultures growing S.  Aureus MSSA, no bacteremia  · T spine epidural abscess, site of the past lead , no OM or diskitis on MRI 6/3  · Elevated CRP: 168.2  · Elevated lactate-resolved     Other:  Diabetes type 2  Allergy zithromax - itching , no rash    discussion  · 4/16 Spinal cord stimulator placed, leads in the T spine  Dr Kathy Espinoza anesthesia  · 5/31 Erythema, warmth, swelling, pain, and purulent discharge from site  · T spine sx site red tender- CT T spine no tenderness  · Explant stimulator and wash out 6/1  · MRI: Small fluid collection in the posterior epidural space at T6-7 and T7-T8 with some surrounding enhancement.  While this could be postprocedural, abscess cannot be excluded. Not suggestive of OM  plan  · DC on ceftriaxone 2 g daily  least 2 weeks and likely 4 weeks till 7/1 to allow full resolution of the epidural abscess  · Ok for DC w midline    Visit 7/26/21  Took 4 weeks ceftriaxone till end of June 2021  Then wounds did very well but the sites of the lateral drains x 2 were indurated and red hard tender. And some necrosis  Went to ER [facility], 7/2/21, wound swab cx neg, switched the iv AB to invanz 2 weeks till 7/16/21    Today the  induration and scab are over the old pacer site - w induration spreading around, not much tender and  No purulence - pain on off positional.  No redness and no fever  Has been of AB since 7/16/21  Has old pain come back lower lumbar area and radiating to the bilat legs again - sees NS for that            I have personally reviewed the past medical history, past surgical history, medications, social history, and family history, and I haveupdated the database accordingly. Past Medical History:     Past Medical History:   Diagnosis Date    CAD (coronary artery disease)     \"40 % blockage lower heart\"    Cataracts, bilateral     Chest pain     pressure    Chronic back pain     Depression     Diabetes ([address]     Diabetic polyneuropathy ([address]     EKG abnormalities 11/14/2016    RT. BBB    GERD (gastroesophageal reflux disease)     Headache     Hyperlipidemia     Hypertension     Hypothyroidism     Insulin pump in place     Neuropathy     Osteoarthritis     Peripheral vascular disease (HCC)     Pure hypercholesterolemia     Restless legs syndrome     Sleep apnea     Ulcer of lower extremity ([address]     Wears glasses        Past Surgical  History:     Past Surgical History:   Procedure Laterality Date    BACK SURGERY      cage in place at L5-S1., SPINAL CORD STIMULATOR    CARPAL TUNNEL RELEASE Bilateral     CATARACT REMOVAL WITH IMPLANT Right 05/16/2017    CATARACT REMOVAL WITH IMPLANT Left 06/13/2017    DR SHARON Julien CHOLECYSTECTOMY      COLONOSCOPY  07/13/2004    normal    COLONOSCOPY  09/14/2016    no lesions seen, spasms sigmoid colon    COLONOSCOPY  01/14/2021    DR KARLEY MARTINS-    CYST REMOVAL Right     GANGLION CYST REMOVED.  ENDOSCOPY, COLON, DIAGNOSTIC      EGD    JOINT REPLACEMENT Left     knee    OTHER SURGICAL HISTORY  11/21/2016    Revision of spinal cord stimulator    SPINAL CORD STIMULATOR SURGERY N/A 4/16/2021    SPINAL CORD STIMULATOR  BATTERY REPLACEMENT - ABBOTT  -   dr najera closer please performed by Marlo Cheney MD at [address] N/A 6/1/2021    EXPLANT PADDLE SPINAL CORD STIMULATOR, I & D OF WOUND performed by Jacqueline Tavares DO at [address] ENDOSCOPY  08/16/2016    NO LESIONS SEEN    UPPER GASTROINTESTINAL ENDOSCOPY  01/14/2021    ESOPHAGEAL POLYP - DR Orquidea Hollis       Medications:     Current Outpatient Medications:     collagenase (SANTYL) 250 UNIT/GM ointment, Apply topically daily to the left post scabbed wound the dress, Disp: 2 Tube, Rfl: 2    cephALEXin (KEFLEX) 500 MG capsule, Take 1 capsule by mouth 4 times daily for 14 days, Disp: 56 capsule, Rfl: 0    furosemide (LASIX) 20 MG tablet, TAKE 1 TABLET BY MOUTH 2 TIMES A WEEK IF NEEDED FOR WATER RETENTION, Disp: 30 tablet, Rfl: 2    losartan-hydroCHLOROthiazide (HYZAAR) 50-12.5 MG per tablet, Take 1 tablet by mouth daily Indications: was stopped at discharge on 6/5, patient did not want to stop, Disp: , Rfl:     pantoprazole (PROTONIX) 40 MG tablet, Take 1 tablet by mouth every morning (before breakfast), Disp: 90 tablet, Rfl: 1    Calcium Carb-Cholecalciferol (CALCIUM/VITAMIN D) 600-400 MG-UNIT TABS, Take one tab po bid, Disp: 60 tablet, Rfl: 5    sertraline (ZOLOFT) 100 MG tablet, take 1 tablet by mouth once daily, Disp: 90 tablet, Rfl: 0    Continuous Blood Gluc Sensor (FREESTYLE SWETHA 14 DAY SENSOR) [facility], APPLY 1 SENSOR TO THE BACK OF ARM.  REMOVE AND REPLACE EVERY 14 DAYS. USE WITH DEVICE TO MONITOR BLOOD SUGAR, Disp: 1 each, Rfl: 1    atorvastatin (LIPITOR) 40 MG tablet, take 1 tablet by mouth once daily, Disp: 90 tablet, Rfl: 1    metoclopramide (REGLAN) 5 MG tablet, take 1 tablet by mouth twice a day, Disp: 60 tablet, Rfl: 5    insulin aspart (NOVOLOG) 100 UNIT/ML injection vial, INJECT 60 UNITS VIA PUMP ONCE DAILY, Disp: 60 mL, Rfl: 3    levothyroxine (SYNTHROID) 88 MCG tablet, take 1 tablet by mouth once daily, Disp: 90 tablet, Rfl: 2    isosorbide mononitrate (IMDUR) 60 MG extended release tablet, take 1 tablet by mouth once daily, Disp: 90 tablet, Rfl: 0    metoprolol tartrate (LOPRESSOR) 25 MG tablet, take 1 tablet by mouth twice a day, Disp: 60 tablet, Rfl: 0    HYDROcodone-acetaminophen (NORCO) 7.5-325 MG per tablet, take 1 tablet by mouth every 6 hours if needed, Disp: , Rfl: 0    sucralfate (CARAFATE) 1 GM tablet, Take 1 tablet by mouth 4 times daily, Disp: 120 tablet, Rfl: 2    aspirin 81 MG tablet, Take by mouth daily, Disp: , Rfl:     FOLIC ACID PO, Take by mouth, Disp: , Rfl:     cyclobenzaprine (FLEXERIL) 5 MG tablet, Take 5 mg by mouth 3 times daily as needed for Muscle spasms, Disp: , Rfl:     gabapentin (NEURONTIN) 400 MG capsule, Take 400 mg by mouth 3 times daily, Disp: , Rfl:     Omega-3 Fatty Acids (FISH OIL) 1000 MG CPDR, Take  by mouth.  , Disp: , Rfl:       Social History:     Social History     Socioeconomic History    Marital status:       Spouse name: Not on file    Number of children: Not on file    Years of education: Not on file    Highest education level: Not on file   Occupational History    Not on file   Tobacco Use    Smoking status: Never Smoker    Smokeless tobacco: Never Used   Vaping Use    Vaping Use: Never used   Substance and Sexual Activity    Alcohol use: No    Drug use: No    Sexual activity: Not on file   Other Topics Concern    Not on file   Social History Narrative    Not on file     Social Determinants of Health     Financial Resource Strain: Low Risk     Difficulty of Paying Living Expenses: Not hard at all   Food Insecurity: No Food Insecurity    Worried About Running Out of Food in the Last Year: Never true    [place] of Food in the Last Year: Never true   Transportation Needs:     Lack of Transportation (Medical):  Lack of Transportation (Non-Medical):    Physical Activity:     Days of Exercise per Week:     Minutes of Exercise per Session:    Stress:     Feeling of Stress :    Social Connections:     Frequency of Communication with Friends and Family:     Frequency of Social Gatherings with Friends and Family:     Attends Latter day Services:     Active Member of Clubs or Organizations:     Attends Club or Organization Meetings:     Marital Status:    Intimate Partner Violence:     Fear of Current or Ex-Partner:     Emotionally Abused:     Physically Abused:     Sexually Abused:        Family History:     Family History   Problem Relation Age of Onset    Heart Disease Mother     Dementia Mother     Stroke Mother     Heart Disease Father     COPD Father     Heart Disease Brother     Arthritis Brother     Other Brother         AAA    Other Brother         AAA        Allergies:   Actos [pioglitazone], Lisinopril, Metformin and related, and Zithromax [azithromycin]     Review of Systems:   Review of Systems   Constitutional: Negative for activity change. HENT: Negative for congestion. Eyes: Negative for discharge. Respiratory: Negative for apnea. Cardiovascular: Negative for chest pain. Gastrointestinal: Negative for abdominal distention. Endocrine: Negative for cold intolerance. Genitourinary: Negative for dysuria. Musculoskeletal: Positive for back pain. Negative for arthralgias. Skin: Positive for wound. Negative for color change. Allergic/Immunologic: Negative for immunocompromised state. Neurological: Negative for dizziness. 06/01/2021    K 3.8 07/02/2021    K 3.4 06/03/2021    K 3.7 06/01/2021    CL 99 06/03/2021    CL 99 06/01/2021    CO2 22 06/03/2021    CO2 24 06/01/2021    BUN 8 06/03/2021    BUN 14 06/01/2021    CREATININE 0.61 06/03/2021    CREATININE 0.77 06/01/2021    MG 2.3 06/03/2021    MG 2.1 05/31/2021     Hepatic Function Panel:   Lab Results   Component Value Date    PROT 7.0 05/31/2021    PROT 7.5 09/13/2016    LABALBU 3.8 05/31/2021    LABALBU 4.4 10/23/2020    BILITOT 0.63 05/31/2021    BILITOT 0.40 10/23/2020    ALKPHOS 83 05/31/2021    ALKPHOS 91.0 10/23/2020    ALT 19 05/31/2021    ALT 21.0 10/23/2020    AST 18 05/31/2021    AST 25.0 10/23/2020     No results found for: RPR  No results found for: HIV  No results found for: [facility]  Lab Results   Component Value Date    MUCUS 2+ 09/13/2016    RBC 3.65 06/03/2021    RBC 4.34 10/13/2011    TRICHOMONAS NOT REPORTED 09/13/2016    WBC 10.8 06/03/2021    YEAST NOT REPORTED 09/13/2016    TURBIDITY CLEAR 09/13/2016     Lab Results   Component Value Date    CREATININE 0.61 06/03/2021    GLUCOSE 164 06/03/2021    GLUCOSE 310 10/13/2011   you for allowing us to participate in the care of this patient. Please call with questions. Emelia Castillo MD  - Office: [phone]    Please note that this chart was generated using voice recognition Dragon dictation software. Although every effort was made to ensure the accuracy of this automated transcription, some errors in transcription mayhave occurred.

## 2021-08-04 ENCOUNTER — OFFICE VISIT (OUTPATIENT)
Dept: INFECTIOUS DISEASES | Age: 66
End: 2021-08-04
Payer: COMMERCIAL

## 2021-08-04 VITALS
BODY MASS INDEX: 35.37 KG/M2 | HEART RATE: 73 BPM | HEIGHT: 62 IN | WEIGHT: 192.2 LBS | DIASTOLIC BLOOD PRESSURE: 85 MMHG | TEMPERATURE: 97.1 F | SYSTOLIC BLOOD PRESSURE: 144 MMHG

## 2021-08-04 DIAGNOSIS — L98.9 NON-HEALING SKIN LESION: Primary | ICD-10-CM

## 2021-08-04 PROCEDURE — 99214 OFFICE O/P EST MOD 30 MIN: CPT | Performed by: INTERNAL MEDICINE

## 2021-08-04 ASSESSMENT — ENCOUNTER SYMPTOMS
DIARRHEA: 1
BACK PAIN: 1
EYE DISCHARGE: 0
ABDOMINAL DISTENTION: 0
COLOR CHANGE: 0
COUGH: 0
APNEA: 0

## 2021-08-04 NOTE — PROGRESS NOTES
[facility] - Initial Consult Note  Today's Date: 8/4/2021    Impression :   · Infection of spinal cord stimulator  ? 6/1 explant stimulator and wash out  ? Cultures growing S. Aureus MSSA, no bacteremia  · T spine epidural abscess, site of the past lead , no OM or diskitis on MRI 6/3  · Elevated CRP: 168.2  · Elevated lactate-resolved     Other:  Diabetes type 2  Allergy zithromax - itching , no rash    Recommendations   Keep santle and allow wound to separate  See NS in a month for I/D in office to remove scab and allow wound to close  See me on 1 month  Stop keflex - start probiotic     Diagnosis Orders   1. Non-healing skin lesion         Return in about 4 weeks (around 9/1/2021). History of Present Illness:   Jono Marks is a 72y.o.-year-old  female who presents with   Chief Complaint   Patient presents with    Wound Infection   impression  · Infection of spinal cord stimulator  ? 6/1 explant stimulator and wash out  ? Cultures growing S. Aureus MSSA, no bacteremia  · T spine epidural abscess, site of the past lead , no OM or diskitis on MRI 6/3  · Elevated CRP: 168.2  · Elevated lactate-resolved     Other:  Diabetes type 2  Allergy zithromax - itching , no rash    discussion  · 4/16 Spinal cord stimulator placed, leads in the T spine  Dr Serrano Fell anesthesia  · 5/31 Erythema, warmth, swelling, pain, and purulent discharge from site  · T spine sx site red tender- CT T spine no tenderness  · Explant stimulator and wash out 6/1  · MRI: Small fluid collection in the posterior epidural space at T6-7 and T7-T8 with some surrounding enhancement.  While this could be postprocedural, abscess cannot be excluded. Not suggestive of OM  plan  · DC on ceftriaxone 2 g daily  least 2 weeks and likely 4 weeks till 7/1 to allow full resolution of the epidural abscess  · Ok for DC w midline    Visit 7/26/21  Took 4 weeks ceftriaxone till end of June 2021  Then wounds did very well but the sites of the lateral drains x 2 were indurated and red hard tender. And some necrosis  Went to ER [facility], 7/2/21, wound swab cx neg, switched the iv AB to invanz 2 weeks till 7/16/21    Today the  induration and scab are over the old pacer site - w induration spreading around, not much tender and  No purulence - pain on off positional.  No redness and no fever  Has been of AB since 7/16/21  Has old pain come back lower lumbar area and radiating to the bilat legs again - sees NS for that          · Keflex 4 x per day x 2 weeks  · Collagenase x 2 weeks on wound  · See me in a week  · No fluctuation today and hence will not attempt to drain    Visit 8/4/21  Using santle and wound still w same scab and induration did not respond to the keflex - suspect the induration is a scab- no fever and no redness or warmth  Still on keflex - w loose stools    Exam neg  Plan:  Stop keflex - start probiotic  Keep santle and allow wound to separate  See NS in a month for I/D in office to remove scab and allow wound to close  See me on 1 month      I have personally reviewed the past medical history, past surgical history, medications, social history, and family history, and I haveupdated the database accordingly. Past Medical History:     Past Medical History:   Diagnosis Date    CAD (coronary artery disease)     \"40 % blockage lower heart\"    Cataracts, bilateral     Chest pain     pressure    Chronic back pain     Depression     Diabetes ([address]     Diabetic polyneuropathy ([address]     EKG abnormalities 11/14/2016    RT. BBB    GERD (gastroesophageal reflux disease)     Headache     Hyperlipidemia     Hypertension     Hypothyroidism     Insulin pump in place     Neuropathy     Osteoarthritis     Peripheral vascular disease (HCC)     Pure hypercholesterolemia     Restless legs syndrome     Sleep apnea     Ulcer of lower extremity ([address]     Wears glasses        Past Surgical  History:     Past Surgical History: Take one tab po bid, Disp: 60 tablet, Rfl: 5    sertraline (ZOLOFT) 100 MG tablet, take 1 tablet by mouth once daily, Disp: 90 tablet, Rfl: 0    Continuous Blood Gluc Sensor (FREESTYLE SWETHA 14 DAY SENSOR) [facility], APPLY 1 SENSOR TO THE BACK OF ARM. REMOVE AND REPLACE EVERY 14 DAYS. USE WITH DEVICE TO MONITOR BLOOD SUGAR, Disp: 1 each, Rfl: 1    atorvastatin (LIPITOR) 40 MG tablet, take 1 tablet by mouth once daily, Disp: 90 tablet, Rfl: 1    metoclopramide (REGLAN) 5 MG tablet, take 1 tablet by mouth twice a day, Disp: 60 tablet, Rfl: 5    insulin aspart (NOVOLOG) 100 UNIT/ML injection vial, INJECT 60 UNITS VIA PUMP ONCE DAILY, Disp: 60 mL, Rfl: 3    levothyroxine (SYNTHROID) 88 MCG tablet, take 1 tablet by mouth once daily, Disp: 90 tablet, Rfl: 2    isosorbide mononitrate (IMDUR) 60 MG extended release tablet, take 1 tablet by mouth once daily, Disp: 90 tablet, Rfl: 0    metoprolol tartrate (LOPRESSOR) 25 MG tablet, take 1 tablet by mouth twice a day, Disp: 60 tablet, Rfl: 0    HYDROcodone-acetaminophen (NORCO) 7.5-325 MG per tablet, take 1 tablet by mouth every 6 hours if needed, Disp: , Rfl: 0    sucralfate (CARAFATE) 1 GM tablet, Take 1 tablet by mouth 4 times daily, Disp: 120 tablet, Rfl: 2    aspirin 81 MG tablet, Take by mouth daily, Disp: , Rfl:     FOLIC ACID PO, Take by mouth, Disp: , Rfl:     cyclobenzaprine (FLEXERIL) 5 MG tablet, Take 5 mg by mouth 3 times daily as needed for Muscle spasms, Disp: , Rfl:     gabapentin (NEURONTIN) 400 MG capsule, Take 400 mg by mouth 3 times daily, Disp: , Rfl:     Omega-3 Fatty Acids (FISH OIL) 1000 MG CPDR, Take  by mouth.  , Disp: , Rfl:       Social History:     Social History     Socioeconomic History    Marital status:       Spouse name: Not on file    Number of children: Not on file    Years of education: Not on file    Highest education level: Not on file   Occupational History    Not on file   Tobacco Use    Smoking status: Never Smoker  Smokeless tobacco: Never Used   Vaping Use    Vaping Use: Never used   Substance and Sexual Activity    Alcohol use: No    Drug use: No    Sexual activity: Not on file   Other Topics Concern    Not on file   Social History Narrative    Not on file     Social Determinants of Health     Financial Resource Strain: Low Risk     Difficulty of Paying Living Expenses: Not hard at all   Food Insecurity: No Food Insecurity    Worried About Running Out of Food in the Last Year: Never true    [address] in the Last Year: Never true   Transportation Needs:     Lack of Transportation (Medical):  Lack of Transportation (Non-Medical):    Physical Activity:     Days of Exercise per Week:     Minutes of Exercise per Session:    Stress:     Feeling of Stress :    Social Connections:     Frequency of Communication with Friends and Family:     Frequency of Social Gatherings with Friends and Family:     Attends Yaz[employer] Services:     Active Member of Clubs or Organizations:     Attends Club or Organization Meetings:     Marital Status:    Intimate Partner Violence:     Fear of Current or Ex-Partner:     Emotionally Abused:     Physically Abused:     Sexually Abused:        Family History:     Family History   Problem Relation Age of Onset    Heart Disease Mother     Dementia Mother     Stroke Mother     Heart Disease Father     COPD Father     Heart Disease Brother     Arthritis Brother     Other Brother         AAA    Other Brother         AAA        Allergies:   Actos [pioglitazone], Lisinopril, Metformin and related, and Zithromax [azithromycin]     Review of Systems:   Review of Systems   Constitutional: Negative for activity change. HENT: Negative for congestion. Eyes: Negative for discharge. Respiratory: Negative for apnea and cough. Cardiovascular: Negative for chest pain. Gastrointestinal: Positive for diarrhea. Negative for abdominal distention.    Endocrine: Negative for cold intolerance and polyuria. Genitourinary: Negative for dysuria. Musculoskeletal: Positive for back pain. Negative for arthralgias. Skin: Positive for wound. Negative for color change. Allergic/Immunologic: Negative for immunocompromised state. Neurological: Negative for dizziness. Hematological: Negative for adenopathy. Psychiatric/Behavioral: Negative for agitation. Physical Examination :   Blood pressure (!) 144/85, pulse 73, temperature 97.1 °F (36.2 °C), temperature source Temporal, height 5' 2\" (1.575 m), weight 192 lb 3.2 oz (87.2 kg), not currently breastfeeding. Physical Exam  Constitutional:       General: She is not in acute distress. Appearance: Normal appearance. She is not ill-appearing. HENT:      Head: Normocephalic and atraumatic. Nose: Nose normal.      Mouth/Throat:      Mouth: Mucous membranes are moist.   Eyes:      General: No scleral icterus. Conjunctiva/sclera: Conjunctivae normal.   Cardiovascular:      Rate and Rhythm: Normal rate and regular rhythm. Heart sounds: Normal heart sounds. No murmur heard. Pulmonary:      Effort: No respiratory distress. Breath sounds: Normal breath sounds. Abdominal:      General: There is no distension. Palpations: Abdomen is soft. Genitourinary:     Comments: No fleming, urine clear  Musculoskeletal:         General: No swelling or deformity. Cervical back: Neck supple. No rigidity. Skin:     General: Skin is dry. Coloration: Skin is not jaundiced. Findings: Lesion present. Neurological:      General: No focal deficit present. Mental Status: She is alert and oriented to person, place, and time. Psychiatric:         Mood and Affect: Mood normal.         Thought Content:  Thought content normal.           Medical Decision Making:   I have independently reviewed/ordered the following labs:    CBCwith Differential:   Lab Results   Component Value Date    WBC 10.8 06/03/2021 WBC 9.5 06/01/2021    HGB 10.4 06/03/2021    HGB 10.1 06/01/2021    HCT 33.6 06/03/2021    HCT 29.5 06/01/2021     06/03/2021     06/01/2021     10/13/2011     09/01/2011    LYMPHOPCT 14 05/31/2021    LYMPHOPCT 33 11/14/2016    MONOPCT 10 05/31/2021    MONOPCT 8 11/14/2016     BMP:  Lab Results   Component Value Date     06/03/2021     06/01/2021    K 3.8 07/02/2021    K 3.4 06/03/2021    K 3.7 06/01/2021    CL 99 06/03/2021    CL 99 06/01/2021    CO2 22 06/03/2021    CO2 24 06/01/2021    BUN 8 06/03/2021    BUN 14 06/01/2021    CREATININE 0.61 06/03/2021    CREATININE 0.77 06/01/2021    MG 2.3 06/03/2021    MG 2.1 05/31/2021     Hepatic Function Panel:   Lab Results   Component Value Date    PROT 7.0 05/31/2021    PROT 7.5 09/13/2016    LABALBU 3.8 05/31/2021    LABALBU 4.4 10/23/2020    BILITOT 0.63 05/31/2021    BILITOT 0.40 10/23/2020    ALKPHOS 83 05/31/2021    ALKPHOS 91.0 10/23/2020    ALT 19 05/31/2021    ALT 21.0 10/23/2020    AST 18 05/31/2021    AST 25.0 10/23/2020     No results found for: RPR  No results found for: HIV  No results found for: [facility]  Lab Results   Component Value Date    MUCUS 2+ 09/13/2016    RBC 3.65 06/03/2021    RBC 4.34 10/13/2011    TRICHOMONAS NOT REPORTED 09/13/2016    WBC 10.8 06/03/2021    YEAST NOT REPORTED 09/13/2016    TURBIDITY CLEAR 09/13/2016     Lab Results   Component Value Date    CREATININE 0.61 06/03/2021    GLUCOSE 164 06/03/2021    GLUCOSE 310 10/13/2011   you for allowing us to participate in the care of this patient. Please call with questions. Dipak Simmons MD  - Office: [phone]    Please note that this chart was generated using voice recognition Dragon dictation software. Although every effort was made to ensure the accuracy of this automated transcription, some errors in transcription mayhave occurred.

## 2021-09-01 ENCOUNTER — OFFICE VISIT (OUTPATIENT)
Dept: INFECTIOUS DISEASES | Age: 66
End: 2021-09-01
Payer: COMMERCIAL

## 2021-09-01 ENCOUNTER — OFFICE VISIT (OUTPATIENT)
Dept: NEUROSURGERY | Age: 66
End: 2021-09-01
Payer: COMMERCIAL

## 2021-09-01 VITALS
HEIGHT: 62 IN | SYSTOLIC BLOOD PRESSURE: 130 MMHG | OXYGEN SATURATION: 97 % | WEIGHT: 193 LBS | DIASTOLIC BLOOD PRESSURE: 65 MMHG | HEART RATE: 73 BPM | BODY MASS INDEX: 35.51 KG/M2

## 2021-09-01 VITALS
HEIGHT: 62 IN | BODY MASS INDEX: 35.33 KG/M2 | TEMPERATURE: 97.5 F | DIASTOLIC BLOOD PRESSURE: 65 MMHG | WEIGHT: 192 LBS | SYSTOLIC BLOOD PRESSURE: 130 MMHG

## 2021-09-01 DIAGNOSIS — I97.89 NECROSIS OF SURGICAL WOUND (HCC): Primary | ICD-10-CM

## 2021-09-01 DIAGNOSIS — R23.4: Primary | ICD-10-CM

## 2021-09-01 DIAGNOSIS — I96 NECROSIS OF SURGICAL WOUND (HCC): Primary | ICD-10-CM

## 2021-09-01 PROCEDURE — 99213 OFFICE O/P EST LOW 20 MIN: CPT | Performed by: NEUROLOGICAL SURGERY

## 2021-09-01 PROCEDURE — 99214 OFFICE O/P EST MOD 30 MIN: CPT | Performed by: INTERNAL MEDICINE

## 2021-09-01 ASSESSMENT — ENCOUNTER SYMPTOMS
COLOR CHANGE: 0
BACK PAIN: 0
APNEA: 0
DIARRHEA: 0
COUGH: 0
ABDOMINAL DISTENTION: 0
EYE DISCHARGE: 0

## 2021-09-01 NOTE — PROGRESS NOTES
[address] [place] [address], [address] [facility] [address]  Dept: [phone]    Patient:  Trinidad Wheeler  YOB: 1955  Date: 9/1/21    The patient is a 72 y.o. female who presents today for consult of the following problems:     Chief Complaint   Patient presents with    Follow-up             HPI:     Trinidad Wheeler is a 72 y.o. female on whom neurosurgical consultation was requested by Saulo Larios MD for management of nonhealing lumbar wound. No fevers chills nausea or emesis. No tenderness or pain. C/o drainage on the bandage nightly. States that she first noticed 2 small opening in the region of current eschar 3d post generator implantation. History:     Past Medical History:   Diagnosis Date    CAD (coronary artery disease)     \"40 % blockage lower heart\"    Cataracts, bilateral     Chest pain     pressure    Chronic back pain     Depression     Diabetes ([address]     Diabetic polyneuropathy ([address]     EKG abnormalities 11/14/2016    RT. BBB    GERD (gastroesophageal reflux disease)     Headache     Hyperlipidemia     Hypertension     Hypothyroidism     Insulin pump in place     Neuropathy     Osteoarthritis     Peripheral vascular disease (HCC)     Pure hypercholesterolemia     Restless legs syndrome     Sleep apnea     Ulcer of lower extremity ([address]     Wears glasses      Past Surgical History:   Procedure Laterality Date    BACK SURGERY      cage in place at L5-S1., SPINAL CORD STIMULATOR    CARPAL TUNNEL RELEASE Bilateral     CATARACT REMOVAL WITH IMPLANT Right 05/16/2017    CATARACT REMOVAL WITH IMPLANT Left 06/13/2017    DR SHARON HERRERA    CHOLECYSTECTOMY      COLONOSCOPY  07/13/2004    normal    COLONOSCOPY  09/14/2016    no lesions seen, spasms sigmoid colon    COLONOSCOPY  01/14/2021    DR KARLEY MARTINS-    CYST REMOVAL Right     GANGLION CYST REMOVED.     ENDOSCOPY, COLON, DIAGNOSTIC      EGD    JOINT REPLACEMENT Left     knee    OTHER SURGICAL HISTORY  11/21/2016    Revision of spinal cord stimulator    SPINAL CORD STIMULATOR SURGERY N/A 4/16/2021    SPINAL Jeana Gloria  -    requests closer please performed by Meme Garcia MD at [address] N/A 6/1/2021    EXPLANT PADDLE SPINAL CORD STIMULATOR, I & D OF WOUND performed by Carlotta Dumont DO at [address] ENDOSCOPY  08/16/2016    NO LESIONS SEEN    UPPER GASTROINTESTINAL ENDOSCOPY  01/14/2021    ESOPHAGEAL POLYP - DR Ramu Landrum     Family History   Problem Relation Age of Onset    Heart Disease Mother     Dementia Mother     Stroke Mother     Heart Disease Father     COPD Father     Heart Disease Brother     Arthritis Brother     Other Brother         AAA    Other Brother         AAA     Current Outpatient Medications on File Prior to Visit   Medication Sig Dispense Refill    furosemide (LASIX) 20 MG tablet TAKE 1 TABLET BY MOUTH 2 TIMES A WEEK IF NEEDED FOR WATER RETENTION 30 tablet 2    losartan-hydroCHLOROthiazide (HYZAAR) 50-12.5 MG per tablet Take 1 tablet by mouth daily Indications: was stopped at discharge on 6/5, patient did not want to stop      pantoprazole (PROTONIX) 40 MG tablet Take 1 tablet by mouth every morning (before breakfast) 90 tablet 1    Calcium Carb-Cholecalciferol (CALCIUM/VITAMIN D) 600-400 MG-UNIT TABS Take one tab po bid 60 tablet 5    sertraline (ZOLOFT) 100 MG tablet take 1 tablet by mouth once daily 90 tablet 0    Continuous Blood Gluc Sensor (FREESTYLE SWETHA 14 DAY SENSOR) [facility] APPLY 1 SENSOR TO THE BACK OF ARM. REMOVE AND REPLACE EVERY 14 DAYS.  USE WITH DEVICE TO MONITOR BLOOD SUGAR 1 each 1    atorvastatin (LIPITOR) 40 MG tablet take 1 tablet by mouth once daily 90 tablet 1    metoclopramide (REGLAN) 5 MG tablet take 1 tablet by mouth twice a day 60 tablet 5  insulin aspart (NOVOLOG) 100 UNIT/ML injection vial INJECT 60 UNITS VIA PUMP ONCE DAILY 60 mL 3    levothyroxine (SYNTHROID) 88 MCG tablet take 1 tablet by mouth once daily 90 tablet 2    isosorbide mononitrate (IMDUR) 60 MG extended release tablet take 1 tablet by mouth once daily 90 tablet 0    metoprolol tartrate (LOPRESSOR) 25 MG tablet take 1 tablet by mouth twice a day 60 tablet 0    HYDROcodone-acetaminophen (NORCO) 7.5-325 MG per tablet take 1 tablet by mouth every 6 hours if needed  0    sucralfate (CARAFATE) 1 GM tablet Take 1 tablet by mouth 4 times daily 120 tablet 2    aspirin 81 MG tablet Take by mouth daily      FOLIC ACID PO Take by mouth      cyclobenzaprine (FLEXERIL) 5 MG tablet Take 5 mg by mouth 3 times daily as needed for Muscle spasms      gabapentin (NEURONTIN) 400 MG capsule Take 400 mg by mouth 3 times daily      Omega-3 Fatty Acids (FISH OIL) 1000 MG CPDR Take  by mouth. No current facility-administered medications on file prior to visit. Social History     Tobacco Use    Smoking status: Never Smoker    Smokeless tobacco: Never Used   Vaping Use    Vaping Use: Never used   Substance Use Topics    Alcohol use: No    Drug use: No       Allergies   Allergen Reactions    Actos [Pioglitazone] Other (See Comments)     Weight gain    Lisinopril Other (See Comments)     cough    Metformin And Related Diarrhea    Zithromax [Azithromycin] Itching       Review of Systems  Constitutional: Negative for activity change and appetite change. HENT: Negative for ear pain and facial swelling. Eyes: Negative for discharge and itching. Respiratory: Negative for choking and chest tightness. Cardiovascular: Negative for chest pain and leg swelling. Gastrointestinal: Negative for nausea and abdominal pain. Endocrine: Negative for cold intolerance and heat intolerance. Genitourinary: Negative for frequency and flank pain.    Musculoskeletal: Negative for myalgias and joint swelling. Skin: Negative for rash and wound. Allergic/Immunologic: Negative for environmental allergies and food allergies. Hematological: Negative for adenopathy. Does not bruise/bleed easily. Psychiatric/Behavioral: Negative for self-injury. The patient is not nervous/anxious. Physical Exam:      /65   Pulse 73   Ht 5' 2\" (1.575 m)   Wt 193 lb (87.5 kg)   SpO2 97%   BMI 35.30 kg/m²   Estimated body mass index is 35.3 kg/m² as calculated from the following:    Height as of this encounter: 5' 2\" (1.575 m). Weight as of this encounter: 193 lb (87.5 kg). General:  Charles Damon is a 72y.o. year old female who appears her stated age. HEENT: Normocephalic atraumatic. Neck supple. Chest: regular rate; pulses equal  Abdomen: Soft nontender nondistended. Normoactive bowel sounds.   Ext: DP and PT pulses 2+, good cap refill  Neuro    Mentation  Appropriate affect  Registration intact  Orientation intact  3 item recall intact  Judgement intact to situation    Cranial Nerves:   Pupils equal and reactive to light  Extraocular motion intact  Face and shrug symmetric  Tongue midline  No dysarthria  v1-3 sensation symmetric, masseter tone symmetric  Hearing symmetric and intact to finger rub    Sensation:   intact    Motor  L deltoid 5/5; R deltoid 5/5  L biceps 5/5; R biceps 5/5  L triceps 5/5; R triceps 5/5  L wrist extension 5/5; R wrist extension 5/5  L intrinsics 5/5; R intrinsics 5/5     L iliopsoas 5/5 , R iliopsoas 5/5  L quadriceps 5/5; R quadriceps 5/5  L Dorsiflexion 5/5; R dorsiflexion 5/5  L Plantarflexion 5/5; R plantarflexion 5/5  L EHL 5/5; R EHL 5/5    Reflexes  L Brachioradialis 2+/4; R brachioradialis 2+/4  L Biceps 2+/4; R Biceps 2+/4  L Triceps 2+/4; R Triceps 2+/4  L Patellar 2+/4: R Patellar 2+/4  L Achilles 2+/4; R Achilles 2+/4    hoffmans L: neg  hoffmans R: neg  Clonus L: neg  Clonus R: neg  Babinski L: up  Babinski R; up    Approximately  2 x areas adjacent with 1x1cm & 2x2cm region with eschar. Area cleansed and probed with no tract evident. Attempted debridment of necrotic eschar which was very adherent and tough. No expressible purulence, drainage, malodor. nontender to palpation. Midline and L flank sites without fluctuance or dehiscence. Studies Review:     none    Assessment and Plan:      1. Eschar of trunk          Plan:   3mo post I&D with persistent eschar evident  Will obtain lumbar ct with contrast & plastics evaluation -- may need enzymatic debridement vs excisional debridement with complex wound closure. Normalized markers/afebrile/no leukocytosis so low suspicion for persistent abscess    Followup: No follow-ups on file. Prescriptions Ordered:  No orders of the defined types were placed in this encounter. Orders Placed:  Orders Placed This Encounter   Procedures    CT LUMBAR SPINE W CONTRAST     Standing Status:   Future     Standing Expiration Date:   11/30/2021     Order Specific Question:   Reason for exam:     Answer:   rule out underlying abscess   Vibha Simpson MD, Plastic Surgery, [place]     Referral Priority:   Routine     Referral Type:   Eval and Treat     Referral Reason:   Specialty Services Required     Referred to Provider:   Judy Ibarra MD     Requested Specialty:   Plastic Surgery     Number of Visits Requested:   1        Electronically signed by Luz Elena Quinn DO on 9/1/2021 at 4:41 PM    Please note that this chart was generated using voice recognition Dragon dictation software. Although every effort was made to ensure the accuracy of this automated transcription, some errors in transcription may have occurred.

## 2021-09-01 NOTE — PROGRESS NOTES
[facility] - Initial Consult Note  Today's Date: 9/1/2021    Impression :   · Infection of spinal cord stimulator  ? 6/1 explant stimulator and wash out  ? Cultures growing S. Aureus MSSA, no bacteremia  · T spine epidural abscess, site of the past lead , no OM or diskitis on MRI 6/3  · Elevated CRP: 168.2  · Elevated lactate-resolved     Other:  Diabetes type 2  Allergy zithromax - itching , no rash    Recommendations   I ll Talk to Dr Omayra Diamond NS  Make apointt w him again  Keep santle and needs small I/D area, by NS or by wound care       Diagnosis Orders   1. Necrosis of surgical wound ([address]         Return in about 4 weeks (around 9/29/2021). History of Present Illness:   Laura Moser is a 72y.o.-year-old  female who presents with   Chief Complaint   Patient presents with    Frequent Infections     skin lesion   impression  · Infection of spinal cord stimulator  ? 6/1 explant stimulator and wash out  ? Cultures growing S. Aureus MSSA, no bacteremia  · T spine epidural abscess, site of the past lead , no OM or diskitis on MRI 6/3  · Elevated CRP: 168.2  · Elevated lactate-resolved     Other:  Diabetes type 2  Allergy zithromax - itching , no rash    discussion  · 4/16 Spinal cord stimulator placed, leads in the T spine  Dr Deidre Torres anesthesia  · 5/31 Erythema, warmth, swelling, pain, and purulent discharge from site  · T spine sx site red tender- CT T spine no tenderness  · Explant stimulator and wash out 6/1  · MRI: Small fluid collection in the posterior epidural space at T6-7 and T7-T8 with some surrounding enhancement.  While this could be postprocedural, abscess cannot be excluded. Not suggestive of OM  plan  · DC on ceftriaxone 2 g daily  least 2 weeks and likely 4 weeks till 7/1 to allow full resolution of the epidural abscess  · Ok for DC w midline    Visit 7/26/21  Took 4 weeks ceftriaxone till end of June 2021  Then wounds did very well but the sites of the lateral drains x 2 were indurated and red hard tender. And some necrosis  Went to ER [facility], 7/2/21, wound swab cx neg, switched the iv AB to invanz 2 weeks till 7/16/21    Today the  induration and scab are over the old pacer site - w induration spreading around, not much tender and  No purulence - pain on off positional.  No redness and no fever  Has been of AB since 7/16/21  Has old pain come back lower lumbar area and radiating to the bilat legs again - sees NS for that          · Keflex 4 x per day x 2 weeks  · Collagenase x 2 weeks on wound  · See me in a week  · No fluctuation today and hence will not attempt to drain    Visit 8/4/21  Using santle and wound still w same scab and induration did not respond to the keflex - suspect the induration is a scab- no fever and no redness or warmth  Still on keflex - w loose stools    Exam neg  Plan:  Stop keflex - start probiotic  Keep santle and allow wound to separate  See NS in a month for I/D in office to remove scab and allow wound to close  See me on 1 month    Visit 9/1/21  Scab more macerated but still attached - induration much better and pain better - feels better  Off AB and on Santle  declined from NS office? ?    Plan:  Talk to Dr Marissa Kuo NS  Keep santle and needs small I/D area, by NS or by wound care      I have personally reviewed the past medical history, past surgical history, medications, social history, and family history, and I haveupdated the database accordingly. Past Medical History:     Past Medical History:   Diagnosis Date    CAD (coronary artery disease)     \"40 % blockage lower heart\"    Cataracts, bilateral     Chest pain     pressure    Chronic back pain     Depression     Diabetes ([address]     [address]neuropathy [facility]     EKG abnormalities 11/14/2016    RT. BBB    GERD (gastroesophageal reflux disease)     Headache     Hyperlipidemia     Hypertension     Hypothyroidism     Insulin pump in place     Neuropathy     Osteoarthritis     Peripheral vascular [facility]     Pure hypercholesterolemia     Restless legs syndrome     Sleep apnea     Ulcer of lower extremity ([address]     Wears glasses        Past Surgical  History:     Past Surgical History:   Procedure Laterality Date    BACK SURGERY      cage in place at L5-S1., SPINAL CORD STIMULATOR    CARPAL TUNNEL RELEASE Bilateral     CATARACT REMOVAL WITH IMPLANT Right 05/16/2017    CATARACT REMOVAL WITH IMPLANT Left 06/13/2017    DR SHARON HERRERA    CHOLECYSTECTOMY      COLONOSCOPY  07/13/2004    normal    COLONOSCOPY  09/14/2016    no lesions seen, spasms sigmoid colon    COLONOSCOPY  01/14/2021    DR KARLEY MARTINS-    CYST REMOVAL Right     GANGLION CYST REMOVED.     ENDOSCOPY, COLON, DIAGNOSTIC      EGD    JOINT REPLACEMENT Left     knee    OTHER SURGICAL HISTORY  11/21/2016    Revision of spinal cord stimulator    SPINAL CORD STIMULATOR SURGERY N/A 4/16/2021    SPINAL CORD STIMULATOR  BATTERY REPLACEMENT - ABBOTT  -   dr reinaldo huang please performed by Colette Julien MD at [address] [place] [address],[facility] N/A 6/1/2021    EXPLANT PADDLE SPINAL CORD STIMULATOR, I & D OF WOUND performed by Sheridan Martel DO at [address] ENDOSCOPY  08/16/2016    NO LESIONS SEEN    UPPER GASTROINTESTINAL ENDOSCOPY  01/14/2021    ESOPHAGEAL POLYP - DR Frantz MARTINS       Medications:     Current Outpatient Medications:     furosemide (LASIX) 20 MG tablet, TAKE 1 TABLET BY MOUTH 2 TIMES A WEEK IF NEEDED FOR WATER RETENTION, Disp: 30 tablet, Rfl: 2    losartan-hydroCHLOROthiazide (HYZAAR) 50-12.5 MG per tablet, Take 1 tablet by mouth daily Indications: was stopped at discharge on 6/5, patient did not want to stop, Disp: , Rfl:     pantoprazole (PROTONIX) 40 MG tablet, Take 1 tablet by mouth every morning (before breakfast), Disp: 90 tablet, Rfl: 1    Calcium Carb-Cholecalciferol (CALCIUM/VITAMIN D) 600-400 MG-UNIT TABS, Take one tab po bid, Disp: 60 tablet, Rfl: 5    sertraline (ZOLOFT) 100 MG tablet, take 1 tablet by mouth once daily, Disp: 90 tablet, Rfl: 0    Continuous Blood Gluc Sensor (FREESTYLE SWETHA 14 DAY SENSOR) [facility] [place] 1 SENSOR TO THE BACK OF ARM. REMOVE AND REPLACE EVERY 14 DAYS. USE WITH DEVICE TO MONITOR BLOOD SUGAR, Disp: 1 each, Rfl: 1    atorvastatin (LIPITOR) 40 MG tablet, take 1 tablet by mouth once daily, Disp: 90 tablet, Rfl: 1    metoclopramide (REGLAN) 5 MG tablet, take 1 tablet by mouth twice a day, Disp: 60 tablet, Rfl: 5    insulin aspart (NOVOLOG) 100 UNIT/ML injection vial, INJECT 60 UNITS VIA PUMP ONCE DAILY, Disp: 60 mL, Rfl: 3    levothyroxine (SYNTHROID) 88 MCG tablet, take 1 tablet by mouth once daily, Disp: 90 tablet, Rfl: 2    isosorbide mononitrate (IMDUR) 60 MG extended release tablet, take 1 tablet by mouth once daily, Disp: 90 tablet, Rfl: 0    metoprolol tartrate (LOPRESSOR) 25 MG tablet, take 1 tablet by mouth twice a day, Disp: 60 tablet, Rfl: 0    HYDROcodone-acetaminophen (NORCO) 7.5-325 MG per tablet, take 1 tablet by mouth every 6 hours if needed, Disp: , Rfl: 0    sucralfate (CARAFATE) 1 GM tablet, Take 1 tablet by mouth 4 times daily, Disp: 120 tablet, Rfl: 2    aspirin 81 MG tablet, Take by mouth daily, Disp: , Rfl:     FOLIC ACID PO, Take by mouth, Disp: , Rfl:     cyclobenzaprine (FLEXERIL) 5 MG tablet, Take 5 mg by mouth 3 times daily as needed for Muscle spasms, Disp: , Rfl:     gabapentin (NEURONTIN) 400 MG capsule, Take 400 mg by mouth 3 times daily, Disp: , Rfl:     Omega-3 Fatty Acids (FISH OIL) 1000 MG CPDR, Take  by mouth.  , Disp: , Rfl:       Social History:     Social History     Socioeconomic History    Marital status:       Spouse name: Not on file    Number of children: Not on file    Years of education: Not on file    Highest education level: Not on file   Occupational History    Not on file   Tobacco Use    Smoking status: Never Smoker    Smokeless tobacco: Never Used   Vaping Use    Vaping Use: Never used   Substance and Sexual Activity    Alcohol use: No    Drug use: No    Sexual activity: Not on file   Other Topics Concern    Not on file   Social History Narrative    Not on file     Social Determinants of Health     Financial Resource Strain: Low Risk     Difficulty of Paying Living Expenses: Not hard at all   Food Insecurity: No Food Insecurity    Worried About [address] in the Last Year: Never true    [facility] in the Last Year: Never true   Transportation Needs:     Lack of Transportation (Medical):  Lack of Transportation (Non-Medical):    Physical Activity:     Days of Exercise per Week:     Minutes of Exercise per Session:    Stress:     Feeling of Stress :    Social Connections:     Frequency of Communication with Friends and Family:     Frequency of Social Gatherings with Friends and Family:     Attends [employer] Services:     Active Member of Clubs or Organizations:     Attends Club or Organization Meetings:     Marital Status:    Intimate Partner Violence:     Fear of Current or Ex-Partner:     Emotionally Abused:     Physically Abused:     Sexually Abused:        Family History:     Family History   Problem Relation Age of Onset    Heart Disease Mother     Dementia Mother     Stroke Mother     Heart Disease Father     COPD Father     Heart Disease Brother     Arthritis Brother     Other Brother         AAA    Other Brother         AAA        Allergies:   Actos [pioglitazone], Lisinopril, Metformin and related, and Zithromax [azithromycin]     Review of Systems:   Review of Systems   Constitutional: Negative for activity change. HENT: Negative for congestion. Eyes: Negative for discharge. Respiratory: Negative for apnea and cough. Cardiovascular: Negative for chest pain. Gastrointestinal: Negative for abdominal distention and diarrhea.    Endocrine: Negative for cold intolerance and polyuria. Genitourinary: Negative for dysuria. Musculoskeletal: Negative for arthralgias and back pain. Skin: Positive for wound. Negative for color change. Allergic/Immunologic: Negative for immunocompromised state. Neurological: Negative for dizziness and weakness. Hematological: Negative for adenopathy. Psychiatric/Behavioral: Negative for agitation and confusion. Physical Examination :   Blood pressure 130/65, temperature 97.5 °F (36.4 °C), temperature source Temporal, height 5' 2\" (1.575 m), weight 192 lb (87.1 kg), not currently breastfeeding. Physical Exam  Constitutional:       General: She is not in acute distress. Appearance: Normal appearance. She is not ill-appearing. HENT:      Head: Normocephalic and atraumatic. Nose: Nose normal.      Mouth/Throat:      Mouth: Mucous membranes are moist.   Eyes:      General: No scleral icterus. Conjunctiva/sclera: Conjunctivae normal.   Cardiovascular:      Rate and Rhythm: Normal rate and regular rhythm. Heart sounds: Normal heart sounds. No murmur heard. Pulmonary:      Effort: No respiratory distress. Breath sounds: Normal breath sounds. Abdominal:      General: There is no distension. Palpations: Abdomen is soft. Genitourinary:     Comments: No fleming, urine clear  Musculoskeletal:         General: No swelling or deformity. Cervical back: Neck supple. No rigidity. Skin:     General: Skin is dry. Coloration: Skin is not jaundiced or pale. Findings: Lesion present. No erythema. Neurological:      General: No focal deficit present. Mental Status: She is alert and oriented to person, place, and time. Psychiatric:         Mood and Affect: Mood normal.         Thought Content:  Thought content normal.           Medical Decision Making:   I have independently reviewed/ordered the following labs:    CBCwith Differential:   Lab Results   Component Value Date    WBC 10.8 06/03/2021    WBC 9.5 06/01/2021    HGB 10.4 06/03/2021    HGB 10.1 06/01/2021    HCT 33.6 06/03/2021    HCT 29.5 06/01/2021     06/03/2021     06/01/2021     10/13/2011     09/01/2011    LYMPHOPCT 14 05/31/2021    LYMPHOPCT 33 11/14/2016    MONOPCT 10 05/31/2021    MONOPCT 8 11/14/2016     BMP:  Lab Results   Component Value Date     06/03/2021     06/01/2021    K 3.8 07/02/2021    K 3.4 06/03/2021    K 3.7 06/01/2021    CL 99 06/03/2021    CL 99 06/01/2021    CO2 22 06/03/2021    CO2 24 06/01/2021    BUN 8 06/03/2021    BUN 14 06/01/2021    CREATININE 0.61 06/03/2021    CREATININE 0.77 06/01/2021    MG 2.3 06/03/2021    MG 2.1 05/31/2021     Hepatic Function Panel:   Lab Results   Component Value Date    PROT 7.0 05/31/2021    PROT 7.5 09/13/2016    LABALBU 3.8 05/31/2021    LABALBU 4.4 10/23/2020    BILITOT 0.63 05/31/2021    BILITOT 0.40 10/23/2020    ALKPHOS 83 05/31/2021    ALKPHOS 91.0 10/23/2020    ALT 19 05/31/2021    ALT 21.0 10/23/2020    AST 18 05/31/2021    AST 25.0 10/23/2020     No results found for: RPR  No results found for: HIV  No results found for: [facility]  Lab Results   Component Value Date    MUCUS 2+ 09/13/2016    RBC 3.65 06/03/2021    RBC 4.34 10/13/2011    TRICHOMONAS NOT REPORTED 09/13/2016    WBC 10.8 06/03/2021    YEAST NOT REPORTED 09/13/2016    TURBIDITY CLEAR 09/13/2016     Lab Results   Component Value Date    CREATININE 0.61 06/03/2021    GLUCOSE 164 06/03/2021    GLUCOSE 310 10/13/2011   you for allowing us to participate in the care of this patient. Please call with questions. Vivi Ruiz MD  - Office: [phone]    Please note that this chart was generated using voice recognition Dragon dictation software. Although every effort was made to ensure the accuracy of this automated transcription, some errors in transcription mayhave occurred.

## 2021-09-29 ENCOUNTER — OFFICE VISIT (OUTPATIENT)
Dept: INFECTIOUS DISEASES | Age: 66
End: 2021-09-29
Payer: COMMERCIAL

## 2021-09-29 VITALS
BODY MASS INDEX: 36.36 KG/M2 | OXYGEN SATURATION: 98 % | WEIGHT: 197.6 LBS | TEMPERATURE: 97.3 F | SYSTOLIC BLOOD PRESSURE: 123 MMHG | DIASTOLIC BLOOD PRESSURE: 72 MMHG | HEART RATE: 72 BPM | RESPIRATION RATE: 18 BRPM | HEIGHT: 62 IN

## 2021-09-29 DIAGNOSIS — T14.8XXD WOUND HEALING, DELAYED: Primary | ICD-10-CM

## 2021-09-29 PROCEDURE — 99214 OFFICE O/P EST MOD 30 MIN: CPT | Performed by: INTERNAL MEDICINE

## 2021-09-29 ASSESSMENT — ENCOUNTER SYMPTOMS
COUGH: 0
COLOR CHANGE: 0
ABDOMINAL DISTENTION: 0
BACK PAIN: 0
DIARRHEA: 0
APNEA: 0
EYE DISCHARGE: 0

## 2021-09-29 NOTE — PROGRESS NOTES
[facility] - Initial Consult Note  Today's Date: 9/29/2021    Impression :   · Infection of spinal cord stimulator  ? 6/1 explant stimulator and wash out  ? Cultures growing S. Aureus MSSA, no bacteremia  · T spine epidural abscess, site of the past lead , no OM or diskitis on MRI 6/3  · Elevated CRP: 168.2  · Elevated lactate-resolved     Other:  Diabetes type 2  Allergy zithromax - itching , no rash    Recommendations   Keep collagenase gel and refer to wound care  See me PRN       Diagnosis Orders   1. Wound healing, delayed  [address]       Return if symptoms worsen or fail to improve. History of Present Illness:   Vanessa Jennings is a 77y.o.-year-old  female who presents with   Chief Complaint   Patient presents with    Wound Check     left hip back area    impression  · Infection of spinal cord stimulator  ? 6/1 explant stimulator and wash out  ? Cultures growing S. Aureus MSSA, no bacteremia  · T spine epidural abscess, site of the past lead , no OM or diskitis on MRI 6/3  · Elevated CRP: 168.2  · Elevated lactate-resolved     Other:  Diabetes type 2  Allergy zithromax - itching , no rash    discussion  · 4/16 Spinal cord stimulator placed, leads in the T spine  Dr Parker Mittal anesthesia  · 5/31 Erythema, warmth, swelling, pain, and purulent discharge from site  · T spine sx site red tender- CT T spine no tenderness  · Explant stimulator and wash out 6/1  · MRI: Small fluid collection in the posterior epidural space at T6-7 and T7-T8 with some surrounding enhancement.  While this could be postprocedural, abscess cannot be excluded. Not suggestive of OM  plan  · DC on ceftriaxone 2 g daily  least 2 weeks and likely 4 weeks till 7/1 to allow full resolution of the epidural abscess  · Ok for DC w midline    Visit 7/26/21  Took 4 weeks ceftriaxone till end of June 2021  Then wounds did very well but the sites of the lateral drains x 2 were indurated and red hard tender. And some necrosis  Went to ER [facility], 7/2/21, wound swab cx neg, switched the iv AB to invanz 2 weeks till 7/16/21    Today the  induration and scab are over the old pacer site - w induration spreading around, not much tender and  No purulence - pain on off positional.  No redness and no fever  Has been of AB since 7/16/21  Has old pain come back lower lumbar area and radiating to the bilat legs again - sees NS for that          · Keflex 4 x per day x 2 weeks  · Collagenase x 2 weeks on wound  · See me in a week  · No fluctuation today and hence will not attempt to drain    Visit 8/4/21  Using santle and wound still w same scab and induration did not respond to the keflex - suspect the induration is a scab- no fever and no redness or warmth  Still on keflex - w loose stools    Exam neg  Plan:  Stop keflex - start probiotic  Keep santle and allow wound to separate  See NS in a month for I/D in office to remove scab and allow wound to close  See me on 1 month    Visit 9/1/21  Scab more macerated but still attached - induration much better and pain better - feels better  Off AB and on Santle  declined from NS office? ?    Plan:  Talk to Dr Stan Fraga and needs small I/D area, by NS or by wound care    Visit 9/29/21  SCAB BETTER AND DOING WELL ON THE collagenase - vbut still has a thick area that needfs to be excised - induration better - off AB x while  NS resected part and not all    Will refer to wound care for full resection and keep collagenase for now      I have personally reviewed the past medical history, past surgical history, medications, social history, and family history, and I haveupdated the database accordingly.   Past Medical History:     Past Medical History:   Diagnosis Date    CAD (coronary artery disease)     \"40 % blockage lower heart\"    Cataracts, bilateral     Chest pain     pressure    Chronic back pain     Depression     [facility] [address] [facility]     Diabetic polyneuropathy [facility] [address]     EKG abnormalities 11/14/2016    RT. BBB    GERD (gastroesophageal reflux disease)     Headache     Hyperlipidemia     Hypertension     Hypothyroidism     Insulin pump in place     Neuropathy     Osteoarthritis     Peripheral vascular disease (HCC)     Pure hypercholesterolemia     Restless legs syndrome     Sleep apnea     Ulcer of lower extremity ([facility] [address]     Wears glasses        Past Surgical  History:     Past Surgical History:   Procedure Laterality Date    BACK SURGERY      cage in place at L5-S1., SPINAL CORD STIMULATOR    CARPAL TUNNEL RELEASE Bilateral     CATARACT REMOVAL WITH IMPLANT Right 05/16/2017    CATARACT REMOVAL WITH IMPLANT Left 06/13/2017    DR SHARON HERRERA    CHOLECYSTECTOMY      COLONOSCOPY  07/13/2004    normal    COLONOSCOPY  09/14/2016    no lesions seen, spasms sigmoid colon    COLONOSCOPY  01/14/2021    DR KARLEY MARTINS-    CYST REMOVAL Right     GANGLION CYST REMOVED.     ENDOSCOPY, COLON, DIAGNOSTIC      EGD    JOINT REPLACEMENT Left     knee    OTHER SURGICAL HISTORY  11/21/2016    Revision of spinal cord stimulator    SPINAL CORD STIMULATOR SURGERY N/A 4/16/2021    SPINAL CORD STIMULATOR  BATTERY REPLACEMENT - ABBOTT  -   dr najera closer please performed by Carlos Germain MD at [address] N/A 6/1/2021    EXPLANT PADDLE SPINAL CORD STIMULATOR, I & D OF WOUND performed by Ngoc Blank DO at [address] ENDOSCOPY  08/16/2016    NO LESIONS SEEN    UPPER GASTROINTESTINAL ENDOSCOPY  01/14/2021    ESOPHAGEAL POLYP - DR Danielle MARTINS       Medications:     Current Outpatient Medications:     furosemide (LASIX) 20 MG tablet, TAKE 1 TABLET BY MOUTH 2 TIMES A WEEK IF NEEDED FOR WATER RETENTION, Disp: 30 tablet, Rfl: 2    losartan-hydroCHLOROthiazide (HYZAAR) 50-12.5 MG per tablet, Take 1 tablet by mouth daily Indications: was stopped at discharge on 6/5, patient did not want to stop, Disp: , Rfl:     pantoprazole (PROTONIX) 40 MG tablet, Take 1 tablet by mouth every morning (before breakfast), Disp: 90 tablet, Rfl: 1    Calcium Carb-Cholecalciferol (CALCIUM/VITAMIN D) 600-400 MG-UNIT TABS, Take one tab po bid, Disp: 60 tablet, Rfl: 5    sertraline (ZOLOFT) 100 MG tablet, take 1 tablet by mouth once daily, Disp: 90 tablet, Rfl: 0    Continuous Blood Gluc Sensor (FREESTYLE SWETHA 14 DAY SENSOR) [facility], APPLY 1 SENSOR TO THE BACK OF ARM. REMOVE AND REPLACE EVERY 14 DAYS. USE WITH DEVICE TO MONITOR BLOOD SUGAR, Disp: 1 each, Rfl: 1    atorvastatin (LIPITOR) 40 MG tablet, take 1 tablet by mouth once daily, Disp: 90 tablet, Rfl: 1    metoclopramide (REGLAN) 5 MG tablet, take 1 tablet by mouth twice a day, Disp: 60 tablet, Rfl: 5    insulin aspart (NOVOLOG) 100 UNIT/ML injection vial, INJECT 60 UNITS VIA PUMP ONCE DAILY, Disp: 60 mL, Rfl: 3    levothyroxine (SYNTHROID) 88 MCG tablet, take 1 tablet by mouth once daily, Disp: 90 tablet, Rfl: 2    isosorbide mononitrate (IMDUR) 60 MG extended release tablet, take 1 tablet by mouth once daily, Disp: 90 tablet, Rfl: 0    metoprolol tartrate (LOPRESSOR) 25 MG tablet, take 1 tablet by mouth twice a day, Disp: 60 tablet, Rfl: 0    HYDROcodone-acetaminophen (NORCO) 7.5-325 MG per tablet, take 1 tablet by mouth every 6 hours if needed, Disp: , Rfl: 0    sucralfate (CARAFATE) 1 GM tablet, Take 1 tablet by mouth 4 times daily, Disp: 120 tablet, Rfl: 2    aspirin 81 MG tablet, Take by mouth daily, Disp: , Rfl:     FOLIC ACID PO, Take by mouth, Disp: , Rfl:     cyclobenzaprine (FLEXERIL) 5 MG tablet, Take 5 mg by mouth 3 times daily as needed for Muscle spasms, Disp: , Rfl:     gabapentin (NEURONTIN) 400 MG capsule, Take 400 mg by mouth 3 times daily, Disp: , Rfl:     Omega-3 Fatty Acids (FISH OIL) 1000 MG CPDR, Take  by mouth.  , Disp: , Rfl:       Social History:     Social History     Socioeconomic History    Marital status:   Spouse name: Not on file    Number of children: Not on file    Years of education: Not on file    Highest education level: Not on file   Occupational History    Not on file   Tobacco Use    Smoking status: Never Smoker    Smokeless tobacco: Never Used   Vaping Use    Vaping Use: Never used   Substance and Sexual Activity    Alcohol use: No    Drug use: No    Sexual activity: Not on file   Other Topics Concern    Not on file   Social History Narrative    Not on file     Social Determinants of Health     Financial Resource Strain:     Difficulty of Paying Living Expenses:    Food Insecurity:     Worried About Running Out of Food in the Last Year:     [address] in the Last Year:    Transportation Needs:     Lack of Transportation (Medical):  Lack of Transportation (Non-Medical):    Physical Activity:     Days of Exercise per Week:     Minutes of Exercise per Session:    Stress:     Feeling of Stress :    Social Connections:     Frequency of Communication with Friends and Family:     Frequency of Social Gatherings with Friends and Family:     Attends [employer] Services:     Active Member of Clubs or Organizations:     Attends Club or Organization Meetings:     Marital Status:    Intimate Partner Violence:     Fear of Current or Ex-Partner:     Emotionally Abused:     Physically Abused:     Sexually Abused:        Family History:     Family History   Problem Relation Age of Onset    Heart Disease Mother     Dementia Mother     Stroke Mother     Heart Disease Father     COPD Father     Heart Disease Brother     Arthritis Brother     Other Brother         AAA    Other Brother         AAA        Allergies:   Actos [pioglitazone], Lisinopril, Metformin and related, and Zithromax [azithromycin]     Review of Systems:   Review of Systems   Constitutional: Negative for activity change. HENT: Negative for congestion. Eyes: Negative for discharge.    Respiratory: Negative for apnea and cough. Cardiovascular: Negative for chest pain. Gastrointestinal: Negative for abdominal distention and diarrhea. Endocrine: Negative for cold intolerance and polyuria. Genitourinary: Negative for dysuria. Musculoskeletal: Negative for arthralgias and back pain. Skin: Positive for wound. Negative for color change. Allergic/Immunologic: Negative for immunocompromised state. Neurological: Negative for dizziness, seizures, weakness and numbness. Hematological: Negative for adenopathy. Psychiatric/Behavioral: Negative for agitation and confusion. Physical Examination :   Blood pressure 123/72, pulse 72, temperature 97.3 °F (36.3 °C), temperature source Temporal, resp. rate 18, height 5' 2\" (1.575 m), weight 197 lb 9.6 oz (89.6 kg), SpO2 98 %, not currently breastfeeding. Physical Exam  Constitutional:       General: She is not in acute distress. Appearance: Normal appearance. She is not ill-appearing. HENT:      Head: Normocephalic and atraumatic. Nose: Nose normal.      Mouth/Throat:      Mouth: Mucous membranes are moist.   Eyes:      General: No scleral icterus. Conjunctiva/sclera: Conjunctivae normal.   Cardiovascular:      Rate and Rhythm: Normal rate and regular rhythm. Heart sounds: Normal heart sounds. No murmur heard. Pulmonary:      Effort: No respiratory distress. Breath sounds: Normal breath sounds. Abdominal:      General: There is no distension. Palpations: Abdomen is soft. Genitourinary:     Comments: No fleming, urine clear  Musculoskeletal:         General: No swelling, tenderness, deformity or signs of injury. Cervical back: Neck supple. No rigidity. Skin:     General: Skin is dry. Coloration: Skin is not jaundiced or pale. Findings: Lesion present. No erythema. Neurological:      General: No focal deficit present. Mental Status: She is alert and oriented to person, place, and time.    Psychiatric: Mood and Affect: Mood normal.         Thought Content: Thought content normal.           Medical Decision Making:   I have independently reviewed/ordered the following labs:    CBCwith Differential:   Lab Results   Component Value Date    WBC 10.8 06/03/2021    WBC 9.5 06/01/2021    HGB 10.4 06/03/2021    HGB 10.1 06/01/2021    HCT 33.6 06/03/2021    HCT 29.5 06/01/2021     06/03/2021     06/01/2021     10/13/2011     09/01/2011    LYMPHOPCT 14 05/31/2021    LYMPHOPCT 33 11/14/2016    MONOPCT 10 05/31/2021    MONOPCT 8 11/14/2016     BMP:  Lab Results   Component Value Date     06/03/2021     06/01/2021    K 3.8 07/02/2021    K 3.4 06/03/2021    K 3.7 06/01/2021    CL 99 06/03/2021    CL 99 06/01/2021    CO2 22 06/03/2021    CO2 24 06/01/2021    BUN 8 06/03/2021    BUN 14 06/01/2021    CREATININE 0.61 06/03/2021    CREATININE 0.77 06/01/2021    MG 2.3 06/03/2021    MG 2.1 05/31/2021     Hepatic Function Panel:   Lab Results   Component Value Date    PROT 7.0 05/31/2021    PROT 7.5 09/13/2016    LABALBU 3.8 05/31/2021    LABALBU 4.4 10/23/2020    BILITOT 0.63 05/31/2021    BILITOT 0.40 10/23/2020    ALKPHOS 83 05/31/2021    ALKPHOS 91.0 10/23/2020    ALT 19 05/31/2021    ALT 21.0 10/23/2020    AST 18 05/31/2021    AST 25.0 10/23/2020     No results found for: RPR  No results found for: HIV  No results found for: [facility]  Lab Results   Component Value Date    MUCUS 2+ 09/13/2016    RBC 3.65 06/03/2021    RBC 4.34 10/13/2011    TRICHOMONAS NOT REPORTED 09/13/2016    WBC 10.8 06/03/2021    YEAST NOT REPORTED 09/13/2016    TURBIDITY CLEAR 09/13/2016     Lab Results   Component Value Date    CREATININE 0.61 06/03/2021    GLUCOSE 164 06/03/2021    GLUCOSE 310 10/13/2011   you for allowing us to participate in the care of this patient. Please call with questions.       Handy Delgadillo MD  - Office: [phone]    Please note that this chart was generated using voice recognition Dragon dictation software. Although every effort was made to ensure the accuracy of this automated transcription, some errors in transcription mayhave occurred.

## 2021-09-30 ENCOUNTER — HOSPITAL ENCOUNTER (OUTPATIENT)
Dept: WOUND CARE | Age: 66
Discharge: HOME | End: 2021-09-30
Payer: COMMERCIAL

## 2021-11-10 LAB
AVERAGE GLUCOSE: 293
HBA1C MFR BLD: 6.9 %

## 2025-08-11 ENCOUNTER — APPOINTMENT (OUTPATIENT)
Dept: GENERAL RADIOLOGY | Age: 70
End: 2025-08-11
Payer: MEDICARE

## 2025-08-11 ENCOUNTER — HOSPITAL ENCOUNTER (EMERGENCY)
Age: 70
Discharge: ANOTHER HOSPITAL | End: 2025-08-11
Attending: EMERGENCY MEDICINE
Payer: MEDICARE

## 2025-08-11 ENCOUNTER — APPOINTMENT (OUTPATIENT)
Dept: CT IMAGING | Age: 70
End: 2025-08-11
Payer: MEDICARE

## 2025-08-11 VITALS
HEART RATE: 56 BPM | HEIGHT: 62 IN | OXYGEN SATURATION: 92 % | RESPIRATION RATE: 16 BRPM | SYSTOLIC BLOOD PRESSURE: 175 MMHG | DIASTOLIC BLOOD PRESSURE: 63 MMHG | TEMPERATURE: 98.9 F | BODY MASS INDEX: 34.04 KG/M2 | WEIGHT: 185 LBS

## 2025-08-11 DIAGNOSIS — S82.141A CLOSED FRACTURE OF RIGHT TIBIAL PLATEAU, INITIAL ENCOUNTER: Primary | ICD-10-CM

## 2025-08-11 PROCEDURE — 6360000002 HC RX W HCPCS: Performed by: EMERGENCY MEDICINE

## 2025-08-11 PROCEDURE — 73562 X-RAY EXAM OF KNEE 3: CPT

## 2025-08-11 PROCEDURE — 99285 EMERGENCY DEPT VISIT HI MDM: CPT

## 2025-08-11 PROCEDURE — 96374 THER/PROPH/DIAG INJ IV PUSH: CPT

## 2025-08-11 PROCEDURE — 73700 CT LOWER EXTREMITY W/O DYE: CPT

## 2025-08-11 PROCEDURE — 96376 TX/PRO/DX INJ SAME DRUG ADON: CPT

## 2025-08-11 RX ORDER — MORPHINE SULFATE 4 MG/ML
4 INJECTION, SOLUTION INTRAMUSCULAR; INTRAVENOUS ONCE
Status: COMPLETED | OUTPATIENT
Start: 2025-08-11 | End: 2025-08-11

## 2025-08-11 RX ORDER — MORPHINE SULFATE 4 MG/ML
4 INJECTION, SOLUTION INTRAMUSCULAR; INTRAVENOUS ONCE
Refills: 0 | Status: COMPLETED | OUTPATIENT
Start: 2025-08-11 | End: 2025-08-11

## 2025-08-11 RX ADMIN — MORPHINE SULFATE 4 MG: 4 INJECTION, SOLUTION INTRAMUSCULAR; INTRAVENOUS at 16:00

## 2025-08-11 RX ADMIN — MORPHINE SULFATE 4 MG: 4 INJECTION, SOLUTION INTRAMUSCULAR; INTRAVENOUS at 10:53

## 2025-08-11 ASSESSMENT — PAIN SCALES - GENERAL
PAINLEVEL_OUTOF10: 9
PAINLEVEL_OUTOF10: 6
PAINLEVEL_OUTOF10: 7
PAINLEVEL_OUTOF10: 7

## 2025-08-11 ASSESSMENT — PAIN - FUNCTIONAL ASSESSMENT
PAIN_FUNCTIONAL_ASSESSMENT: 0-10

## 2025-08-11 ASSESSMENT — PAIN DESCRIPTION - ORIENTATION: ORIENTATION: RIGHT

## 2025-08-11 ASSESSMENT — PAIN DESCRIPTION - LOCATION: LOCATION: KNEE

## (undated) DEVICE — 450 ML BOTTLE OF 0.05% CHLORHEXIDINE GLUCONATE IN 99.95% STERILE WATER FOR IRRIGATION, USP AND APPLICATOR.: Brand: IRRISEPT ANTIMICROBIAL WOUND LAVAGE

## (undated) DEVICE — GLOVE ORANGE PI 8 1/2   MSG9085

## (undated) DEVICE — GLOVE ORANGE PI 7   MSG9070

## (undated) DEVICE — ELECTRODE PT RET AD L9FT HI MOIST COND ADH HYDRGEL CORDED

## (undated) DEVICE — SYRINGE MED 10ML TRNSLUC BRL PLUNG BLK MRK POLYPR CTRL

## (undated) DEVICE — BLADE ES ELASTOMERIC COAT INSUL DURABLE BEND UPTO 90DEG

## (undated) DEVICE — YANKAUER,FLEXIBLE HANDLE,REGLR CAPACITY: Brand: MEDLINE INDUSTRIES, INC.

## (undated) DEVICE — SPONGE,NEURO,0.5"X0.5",XR,STRL,10/PK: Brand: MEDLINE

## (undated) DEVICE — C-ARMOR C-ARM EQUIPMENT COVERS CLEAR STERILE UNIVERSAL FIT 12 PER CASE: Brand: C-ARMOR

## (undated) DEVICE — 1000 S-DRAPE TOWEL DRAPE 10/BX: Brand: STERI-DRAPE™

## (undated) DEVICE — SHEET, T, LAPAROTOMY, STERILE: Brand: MEDLINE

## (undated) DEVICE — SYRINGE CATH TIP 50ML

## (undated) DEVICE — TOWEL,OR,DSP,ST,BLUE,DLX,XR,4/PK,20PK/CS: Brand: MEDLINE

## (undated) DEVICE — BLADE ES L4IN INSUL EDGE

## (undated) DEVICE — SYRINGE 20ML LL S/C 50

## (undated) DEVICE — CATHETER,URETHRAL,REDRUBBER,STRL,14FR: Brand: MEDLINE INDUSTRIES, INC.

## (undated) DEVICE — TUBING, SUCTION, 9/32" X 20', STRAIGHT: Brand: MEDLINE INDUSTRIES, INC.

## (undated) DEVICE — MINOR BSIN PK

## (undated) DEVICE — Z DUPLICATE USE 2624755 KIT NEG PRSS DSG W/ PRSS INDIC PTCH STRP 45ML CANSTR CARR

## (undated) DEVICE — PATIENT RETURN ELECTRODE, SINGLE-USE, CONTACT QUALITY MONITORING, ADULT, WITH 9FT CORD, FOR PATIENTS WEIGING OVER 33LBS. (15KG): Brand: MEGADYNE

## (undated) DEVICE — CONTAINER,SPECIMEN,4OZ,OR STRL: Brand: MEDLINE

## (undated) DEVICE — TOTAL TRAY, 16FR 10ML SIL FOLEY, URN: Brand: MEDLINE

## (undated) DEVICE — Device

## (undated) DEVICE — APPLICATOR MEDICATED 26 CC SOLUTION HI LT ORNG CHLORAPREP

## (undated) DEVICE — AGENT HEMOSTATIC SURGIFLOW MATRIX KIT W/THROMBIN

## (undated) DEVICE — CONNECTOR TBNG WHT PLAS SUCT STR 5IN1 LTWT W/ M CONN

## (undated) DEVICE — CATHETER,URETHRAL,REDRUBBER,STRL,12FR: Brand: MEDLINE INDUSTRIES, INC.

## (undated) DEVICE — C-ARM: Brand: UNBRANDED

## (undated) DEVICE — STERILE POLYISOPRENE POWDER-FREE SURGICAL GLOVES WITH EMOLLIENT COATING: Brand: PROTEXIS

## (undated) DEVICE — GLOVE SURG SZ 8 THK118MIL BLK LTX FREE POLYISOPRENE BEAD CUF

## (undated) DEVICE — E-Z CLEAN, NON-STICK, PTFE COATED, ELECTROSURGICAL BLADE ELECTRODE, MODIFIED EXTENDED INSULATION, 2.5 INCH (6.35 CM): Brand: MEGADYNE

## (undated) DEVICE — INTENDED FOR TISSUE SEPARATION, AND OTHER PROCEDURES THAT REQUIRE A SHARP SURGICAL BLADE TO PUNCTURE OR CUT.: Brand: BARD-PARKER ® CARBON RIB-BACK BLADES

## (undated) DEVICE — SUTURE NONABSORBABLE MONOFILAMENT 2-0 FS 18 IN ETHILON 664H

## (undated) DEVICE — SYRINGE MED 10ML LUERLOCK TIP W/O SFTY DISP

## (undated) DEVICE — KIT DRN FLAT W/ 100CC EVAC 7MM FULL PERF

## (undated) DEVICE — STIMULATOR NERVE PT CTRL PROCLAIM

## (undated) DEVICE — GAUZE,SPONGE,FLUFF,6"X6.75",STRL,5/TRAY: Brand: MEDLINE

## (undated) DEVICE — DRESSING NEG PRSS SM 10X7.5X3.3CM POLYUR FOR WND THER VAC

## (undated) DEVICE — 1010 S-DRAPE TOWEL DRAPE 10/BX: Brand: STERI-DRAPE™

## (undated) DEVICE — PACK PROCEDURE SURG LUMBAR SPINE SVMMC

## (undated) DEVICE — DRESSING TRNSPAR W4XL10IN FLM MIC POR SURESITE 123

## (undated) DEVICE — COVER,MAYO STAND,STERILE: Brand: MEDLINE

## (undated) DEVICE — 3.0MM PRECISION NEURO (MATCH HEAD)

## (undated) DEVICE — NEEDLE HYPO 25GA L1.5IN BLU POLYPR HUB S STL REG BVL STR

## (undated) DEVICE — CATHETER,URETHRAL,REDRUBBER,STRL,10FR: Brand: MEDLINE INDUSTRIES, INC.

## (undated) DEVICE — 3M™ IOBAN™ 2 ANTIMICROBIAL INCISE DRAPE 6650EZ: Brand: IOBAN™ 2

## (undated) DEVICE — GOWN,SIRUS,NONRNF,SETINSLV,XL,20/CS: Brand: MEDLINE

## (undated) DEVICE — GOWN,AURORA,NONREINFORCED,LARGE: Brand: MEDLINE

## (undated) DEVICE — MARKER,SKIN,WI/RULER AND LABELS: Brand: MEDLINE

## (undated) DEVICE — SOLUTION PREP POVIDONE IOD FOR SKIN MUCOUS MEM PRIOR TO

## (undated) DEVICE — SPONGE LAP W18XL18IN WHT COT 4 PLY FLD STRUNG RADPQ DISP ST

## (undated) DEVICE — CANISTER NEG PRSS 500ML WND THER W/ TBNG NO PRSS RANG W/

## (undated) DEVICE — CATHETER,URETHRAL,REDRUBBER,STERILE,8FR: Brand: MEDLINE

## (undated) DEVICE — PROTECTOR ULN NRV PUR FOAM HK LOOP STRP ANATOMICALLY